# Patient Record
Sex: MALE | Race: WHITE | Employment: OTHER | URBAN - NONMETROPOLITAN AREA
[De-identification: names, ages, dates, MRNs, and addresses within clinical notes are randomized per-mention and may not be internally consistent; named-entity substitution may affect disease eponyms.]

---

## 2017-01-12 ENCOUNTER — ANTI-COAG VISIT (OUTPATIENT)
Dept: CARDIOLOGY | Age: 62
End: 2017-01-12
Payer: MEDICARE

## 2017-01-12 DIAGNOSIS — I48.0 PAF (PAROXYSMAL ATRIAL FIBRILLATION) (HCC): Primary | ICD-10-CM

## 2017-01-12 LAB
INTERNATIONAL NORMALIZATION RATIO, POC: 2.2
PROTHROMBIN TIME, POC: NORMAL

## 2017-01-12 PROCEDURE — 85610 PROTHROMBIN TIME: CPT | Performed by: CLINICAL NURSE SPECIALIST

## 2017-02-09 ENCOUNTER — ANTI-COAG VISIT (OUTPATIENT)
Dept: CARDIOLOGY | Age: 62
End: 2017-02-09
Payer: MEDICARE

## 2017-02-09 DIAGNOSIS — I48.0 PAF (PAROXYSMAL ATRIAL FIBRILLATION) (HCC): Primary | ICD-10-CM

## 2017-02-09 LAB
INTERNATIONAL NORMALIZATION RATIO, POC: 1.7
PROTHROMBIN TIME, POC: NORMAL

## 2017-02-09 PROCEDURE — 1036F TOBACCO NON-USER: CPT | Performed by: NURSE PRACTITIONER

## 2017-02-09 PROCEDURE — 85610 PROTHROMBIN TIME: CPT | Performed by: NURSE PRACTITIONER

## 2017-02-21 ENCOUNTER — PREP FOR SURGERY (OUTPATIENT)
Dept: OTOLARYNGOLOGY | Facility: CLINIC | Age: 62
End: 2017-02-21

## 2017-02-21 DIAGNOSIS — K11.8 MASS OF PAROTID GLAND: Primary | ICD-10-CM

## 2017-02-21 DIAGNOSIS — K11.20 PAROTITIS: ICD-10-CM

## 2017-03-09 ENCOUNTER — ANTI-COAG VISIT (OUTPATIENT)
Dept: CARDIOLOGY | Age: 62
End: 2017-03-09
Payer: MEDICARE

## 2017-03-09 DIAGNOSIS — I48.0 PAF (PAROXYSMAL ATRIAL FIBRILLATION) (HCC): Primary | ICD-10-CM

## 2017-03-09 LAB
INTERNATIONAL NORMALIZATION RATIO, POC: 1.6
PROTHROMBIN TIME, POC: NORMAL

## 2017-03-09 PROCEDURE — 85610 PROTHROMBIN TIME: CPT | Performed by: CLINICAL NURSE SPECIALIST

## 2017-03-09 PROCEDURE — 1036F TOBACCO NON-USER: CPT | Performed by: CLINICAL NURSE SPECIALIST

## 2017-03-14 ENCOUNTER — APPOINTMENT (OUTPATIENT)
Dept: PREADMISSION TESTING | Facility: HOSPITAL | Age: 62
End: 2017-03-14

## 2017-03-14 ENCOUNTER — HOSPITAL ENCOUNTER (OUTPATIENT)
Dept: GENERAL RADIOLOGY | Facility: HOSPITAL | Age: 62
Discharge: HOME | End: 2017-03-14
Admitting: NURSE PRACTITIONER

## 2017-03-14 VITALS
SYSTOLIC BLOOD PRESSURE: 160 MMHG | RESPIRATION RATE: 18 BRPM | HEART RATE: 78 BPM | DIASTOLIC BLOOD PRESSURE: 87 MMHG | HEIGHT: 73 IN | WEIGHT: 236 LBS | BODY MASS INDEX: 31.28 KG/M2 | OXYGEN SATURATION: 97 %

## 2017-03-14 DIAGNOSIS — K11.20 PAROTITIS: ICD-10-CM

## 2017-03-14 DIAGNOSIS — K11.8 MASS OF PAROTID GLAND: ICD-10-CM

## 2017-03-14 LAB
ALBUMIN SERPL-MCNC: 4.3 G/DL (ref 3.5–5)
ALBUMIN/GLOB SERPL: 1.3 G/DL (ref 1.1–2.5)
ALP SERPL-CCNC: 88 U/L (ref 24–120)
ALT SERPL W P-5'-P-CCNC: 50 U/L (ref 0–54)
ANION GAP SERPL CALCULATED.3IONS-SCNC: 10 MMOL/L (ref 4–13)
APTT PPP: 34.8 SECONDS (ref 24.1–34.8)
AST SERPL-CCNC: 45 U/L (ref 7–45)
BILIRUB SERPL-MCNC: 0.9 MG/DL (ref 0.1–1)
BUN BLD-MCNC: 15 MG/DL (ref 5–21)
BUN/CREAT SERPL: 15 (ref 7–25)
CALCIUM SPEC-SCNC: 9.3 MG/DL (ref 8.4–10.4)
CHLORIDE SERPL-SCNC: 103 MMOL/L (ref 98–110)
CO2 SERPL-SCNC: 26 MMOL/L (ref 24–31)
CREAT BLD-MCNC: 1 MG/DL (ref 0.5–1.4)
DEPRECATED RDW RBC AUTO: 41.5 FL (ref 40–54)
ERYTHROCYTE [DISTWIDTH] IN BLOOD BY AUTOMATED COUNT: 12.5 % (ref 12–15)
GFR SERPL CREATININE-BSD FRML MDRD: 76 ML/MIN/1.73
GLOBULIN UR ELPH-MCNC: 3.4 GM/DL
GLUCOSE BLD-MCNC: 327 MG/DL (ref 70–100)
HCT VFR BLD AUTO: 45.2 % (ref 40–52)
HGB BLD-MCNC: 16 G/DL (ref 14–18)
MCH RBC QN AUTO: 32.3 PG (ref 28–32)
MCHC RBC AUTO-ENTMCNC: 35.4 G/DL (ref 33–36)
MCV RBC AUTO: 91.1 FL (ref 82–95)
PLATELET # BLD AUTO: 213 10*3/MM3 (ref 130–400)
PMV BLD AUTO: 11.2 FL (ref 6–12)
POTASSIUM BLD-SCNC: 4.3 MMOL/L (ref 3.5–5.3)
PROT SERPL-MCNC: 7.7 G/DL (ref 6.3–8.7)
RBC # BLD AUTO: 4.96 10*6/MM3 (ref 4.8–5.9)
SODIUM BLD-SCNC: 139 MMOL/L (ref 135–145)
WBC NRBC COR # BLD: 8.73 10*3/MM3 (ref 4.8–10.8)

## 2017-03-14 PROCEDURE — 85027 COMPLETE CBC AUTOMATED: CPT | Performed by: NURSE PRACTITIONER

## 2017-03-14 PROCEDURE — 93010 ELECTROCARDIOGRAM REPORT: CPT | Performed by: INTERNAL MEDICINE

## 2017-03-14 PROCEDURE — 85730 THROMBOPLASTIN TIME PARTIAL: CPT | Performed by: NURSE PRACTITIONER

## 2017-03-14 PROCEDURE — 93005 ELECTROCARDIOGRAM TRACING: CPT

## 2017-03-14 PROCEDURE — 71020 HC CHEST PA AND LATERAL: CPT

## 2017-03-14 PROCEDURE — 36415 COLL VENOUS BLD VENIPUNCTURE: CPT

## 2017-03-14 PROCEDURE — 80053 COMPREHEN METABOLIC PANEL: CPT | Performed by: NURSE PRACTITIONER

## 2017-03-14 RX ORDER — MULTIVITAMIN WITH IRON
1 TABLET ORAL 2 TIMES DAILY
COMMUNITY
End: 2020-11-30

## 2017-03-14 RX ORDER — ALLOPURINOL 300 MG/1
300 TABLET ORAL DAILY
COMMUNITY
End: 2020-04-08 | Stop reason: SDUPTHER

## 2017-03-14 RX ORDER — LEVOTHYROXINE SODIUM 112 UG/1
112 TABLET ORAL DAILY
COMMUNITY
End: 2020-03-05 | Stop reason: SDUPTHER

## 2017-03-14 RX ORDER — WARFARIN SODIUM 7.5 MG/1
7.5 TABLET ORAL
COMMUNITY
End: 2017-03-21 | Stop reason: HOSPADM

## 2017-03-14 RX ORDER — CLONIDINE HYDROCHLORIDE 0.1 MG/1
0.1 TABLET ORAL DAILY
COMMUNITY

## 2017-03-14 RX ORDER — GABAPENTIN 100 MG/1
100 CAPSULE ORAL 3 TIMES DAILY PRN
COMMUNITY
End: 2020-04-28 | Stop reason: SDUPTHER

## 2017-03-14 RX ORDER — BACLOFEN 10 MG/1
10 TABLET ORAL NIGHTLY
COMMUNITY
End: 2020-02-03 | Stop reason: SDUPTHER

## 2017-03-14 RX ORDER — MELOXICAM 7.5 MG/1
7.5 TABLET ORAL DAILY
COMMUNITY
End: 2020-03-04 | Stop reason: ALTCHOICE

## 2017-03-14 RX ORDER — LANOLIN ALCOHOL/MO/W.PET/CERES
1000 CREAM (GRAM) TOPICAL 2 TIMES DAILY
COMMUNITY
End: 2020-05-27

## 2017-03-14 NOTE — DISCHARGE INSTRUCTIONS
DAY OF SURGERY INSTRUCTIONS        YOUR SURGEON: dr daniel    PROCEDURE: excision parotid tumor with facial nerve monitoring, dissection left     DATE OF SURGERY: 3/21/2017    ARRIVAL TIME: AS DIRECTED BY OFFICE    DAY OF SURGERY TAKE ONLY THESE MEDICATIONS: clonidine            BEFORE YOU COME TO THE HOSPITAL  (Pre-op instructions)  • Do not eat, drink, smoke or chew gum after midnight the night before surgery.  This also includes no mints.  • Morning of surgery take only the medicines you have been instructed with a sip of water unless otherwise instructed  by your physician.  • Do not shave, wear makeup or dark nail polish.  • Remove all jewelry including rings.  • Leave anything you consider valuable at home.  • Leave your suitcase in the car until after your surgery.  • Bring the following with you if applicable:  o Picture ID and insurance, Medicare or Medicaid cards  o Co-pay/deductible required by insurance (cash, check, credit card)  o Medications (no narcotics) in original bottles (not a list) including all over-the-counter medications.  o Copy of advance directive, living will or power-of- documents if not brought to PAT  o CPAP or BIPAP mask and tubing  o Skin prep instruction sheet  o Relaxation aids (MP3 player, book, magazine)  • Confirm your arrival time with you surgeon the day before your surgery (surgery times are subject to change)  • On the day of surgery check in at registration located at the main entrance of the hospital.       Outpatient Surgery Guidelines, Adult  Outpatient procedures are those for which the person having the procedure is allowed to go home the same day as the procedure. Various procedures are done on an outpatient basis. You should follow some general guidelines if you will be having an outpatient procedure.  LET YOUR HEALTH CARE PROVIDER KNOW ABOUT:  · Any allergies you have.  · All medicines you are taking, including vitamins, herbs, eye drops, creams, and  over-the-counter medicines.  · Previous problems you or members of your family have had with the use of anesthetics.  · Any blood disorders you have.  · Previous surgeries you have had.  · Medical conditions you have.  RISKS AND COMPLICATIONS  Your health care provider will discuss possible risks and complications with you before surgery. Common risks and complications include:    · Problems due to the use of anesthetics.  · Blood loss and replacement (does not apply to minor surgical procedures).  · Temporary increase in pain due to surgery.  · Uncorrected pain or problems that the surgery was meant to correct.  · Infection.  · New damage.  BEFORE THE PROCEDURE  · Ask your health care provider about changing or stopping your regular medicines. You may need to stop taking certain medicines in the days or weeks before the procedure.  · Stop smoking at least 2 weeks before surgery. This lowers your risk for complications during and after surgery. Ask your health care provider for help with this if needed.  · Eat your usual meals and a light supper the day before surgery. Continue fluid intake. Do not drink alcohol.  · Do not eat or drink after midnight the night before your surgery.   · Arrange for someone to take you home and to stay with you for 24 hours after the procedure. Medicine given for your procedure may affect your ability to drive or to care for yourself.  · Call your health care provider's office if you develop an illness or problem that may prevent you from safely having your procedure.  AFTER THE PROCEDURE  After surgery, you will be taken to a recovery area, where your progress will be monitored. If there are no complications, you will be allowed to go home when you are awake, stable, and taking fluids well. You may have numbness around the surgical site. Healing will take some time. You will have tenderness at the surgical site and may have some swelling and bruising. You may also have some  nausea.  HOME CARE INSTRUCTIONS  · Do not drive for 24 hours, or as directed by your health care provider. Do not drive while taking prescription pain medicines.  · Do not drink alcohol for 24 hours.  · Do not make important decisions or sign legal documents for 24 hours.  · You may resume a normal diet and activities as directed.  · Do not lift anything heavier than 10 pounds (4.5 kg) or play contact sports until your health care provider says it is okay.  · Change your bandages (dressings) as directed.  · Only take over-the-counter or prescription medicines as directed by your health care provider.  · Follow up with your health care provider as directed.  SEEK MEDICAL CARE IF:  · You have increased bleeding (more than a small spot) from the surgical site.  · You have redness, swelling, or increasing pain in the wound.  · You see pus coming from the wound.  · You have a fever.  · You notice a bad smell coming from the wound or dressing.  · You feel lightheaded or faint.  · You develop a rash.  · You have trouble breathing.  · You develop allergies.  MAKE SURE YOU:  · Understand these instructions.  · Will watch your condition.  · Will get help right away if you are not doing well or get worse.     This information is not intended to replace advice given to you by your health care provider. Make sure you discuss any questions you have with your health care provider.     Document Released: 09/12/2002 Document Revised: 05/03/2016 Document Reviewed: 05/22/2014  [employer] Interactive Patient Education ©2016 [employer]       Fall Prevention in Hospitals, Adult  As a hospital patient, your condition and the treatments you receive can increase your risk for falls. Some additional risk factors for falls in a hospital include:  · Being in an unfamiliar environment.  · Being on bed rest.  · Your surgery.  · Taking certain medicines.  · Your tubing requirements, such as intravenous (IV) therapy or catheters.  It is important  that you learn how to decrease fall risks while at the hospital. Below are important tips that can help prevent falls.  SAFETY TIPS FOR PREVENTING FALLS  Talk about your risk of falling.  · Ask your health care provider why you are at risk for falling. Is it your medicine, illness, tubing placement, or something else?  · Make a plan with your health care provider to keep you safe from falls.  · Ask your health care provider or pharmacist about side effects of your medicines. Some medicines can make you dizzy or affect your coordination.  Ask for help.  · Ask for help before getting out of bed. You may need to press your call button.  · Ask for assistance in getting safely to the toilet.  · Ask for a walker or cane to be put at your bedside. Ask that most of the side rails on your bed be placed up before your health care provider leaves the room.  · Ask family or friends to sit with you.  · Ask for things that are out of your reach, such as your glasses, hearing aids, telephone, bedside table, or call button.  Follow these tips to avoid falling:  · Stay lying or seated, rather than standing, while waiting for help.  · Wear rubber-soled slippers or shoes whenever you walk in the hospital.  · Avoid quick, sudden movements.  ¨ Change positions slowly.  ¨ Sit on the side of your bed before standing.  ¨ Stand up slowly and wait before you start to walk.  · Let your health care provider know if there is a spill on the floor.  · Pay careful attention to the medical equipment, electrical cords, and tubes around you.  · When you need help, use your call button by your bed or in the bathroom. Wait for one of your health care providers to help you.  · If you feel dizzy or unsure of your footing, return to bed and wait for assistance.  · Avoid being distracted by the TV, telephone, or another person in your room.  · Do not lean or support yourself on rolling objects, such as IV poles or bedside tables.     This information is  not intended to replace advice given to you by your health care provider. Make sure you discuss any questions you have with your health care provider.     Document Released: 12/15/2001 Document Revised: 01/08/2016 Document Reviewed: 08/25/2013  [employer] Interactive Patient Education ©2016 [employer]       Surgical Site Infections FAQs  What is a Surgical Site Infection (SSI)?  A surgical site infection is an infection that occurs after surgery in the part of the body where the surgery took place. Most patients who have surgery do not develop an infection. However, infections develop in about 1 to 3 out of every 100 patients who have surgery.  Some of the common symptoms of a surgical site infection are:  · Redness and pain around the area where you had surgery  · Drainage of cloudy fluid from your surgical wound  · Fever  Can SSIs be treated?  Yes. Most surgical site infections can be treated with antibiotics. The antibiotic given to you depends on the bacteria (germs) causing the infection. Sometimes patients with SSIs also need another surgery to treat the infection.  What are some of the things that hospitals are doing to prevent SSIs?  To prevent SSIs, doctors, nurses, and other healthcare providers:  · Clean their hands and arms up to their elbows with an antiseptic agent just before the surgery.  · Clean their hands with soap and water or an alcohol-based hand rub before and after caring for each patient.  · May remove some of your hair immediately before your surgery using electric clippers if the hair is in the same area where the procedure will occur. They should not shave you with a razor.  · Wear special hair covers, masks, gowns, and gloves during surgery to keep the surgery area clean.  · Give you antibiotics before your surgery starts. In most cases, you should get antibiotics within 60 minutes before the surgery starts and the antibiotics should be stopped within 24 hours after surgery.  · Clean  the skin at the site of your surgery with a special soap that kills germs.  What can I do to help prevent SSIs?  Before your surgery:  · Tell your doctor about other medical problems you may have. Health problems such as allergies, diabetes, and obesity could affect your surgery and your treatment.  · Quit smoking. Patients who smoke get more infections. Talk to your doctor about how you can quit before your surgery.  · Do not shave near where you will have surgery. Shaving with a razor can irritate your skin and make it easier to develop an infection.  At the time of your surgery:  · Speak up if someone tries to shave you with a razor before surgery. Ask why you need to be shaved and talk with your surgeon if you have any concerns.  · Ask if you will get antibiotics before surgery.  After your surgery:  · Make sure that your healthcare providers clean their hands before examining you, either with soap and water or an alcohol-based hand rub.  · If you do not see your providers clean their hands, please ask them to do so.  · Family and friends who visit you should not touch the surgical wound or dressings.  · Family and friends should clean their hands with soap and water or an alcohol-based hand rub before and after visiting you. If you do not see them clean their hands, ask them to clean their hands.  What do I need to do when I go home from the hospital?  · Before you go home, your doctor or nurse should explain everything you need to know about taking care of your wound. Make sure you understand how to care for your wound before you leave the hospital.  · Always clean your hands before and after caring for your wound.  · Before you go home, make sure you know who to contact if you have questions or problems after you get home.  · If you have any symptoms of an infection, such as redness and pain at the surgery site, drainage, or fever, call your doctor immediately.  If you have additional questions, please ask  your doctor or nurse.  Developed and co-sponsored by The Society for Healthcare Epidemiology of [place] (SHEA); Infectious Diseases Society of [place] (IDSA); American Hospital Association; Association for Professionals in Infection Control and Epidemiology (APIC); Centers for Disease Control and Prevention (CDC); and The Joint Commission.     This information is not intended to replace advice given to you by your health care provider. Make sure you discuss any questions you have with your health care provider.     Document Released: 12/23/2014 Document Revised: 01/08/2016 Document Reviewed: 03/02/2016  [employer] Interactive Patient Education ©2016 [employer]     PATIENT/FAMILY/RESPONSIBLE PARTY VERBALIZES UNDERSTANDING OF ABOVE EDUCATION

## 2017-03-16 ENCOUNTER — ANTI-COAG VISIT (OUTPATIENT)
Dept: CARDIOLOGY | Age: 62
End: 2017-03-16
Payer: MEDICARE

## 2017-03-16 DIAGNOSIS — I48.0 PAF (PAROXYSMAL ATRIAL FIBRILLATION) (HCC): Primary | ICD-10-CM

## 2017-03-16 LAB
INTERNATIONAL NORMALIZATION RATIO, POC: 2
PROTHROMBIN TIME, POC: NORMAL

## 2017-03-16 PROCEDURE — 85610 PROTHROMBIN TIME: CPT | Performed by: CLINICAL NURSE SPECIALIST

## 2017-03-16 PROCEDURE — 1036F TOBACCO NON-USER: CPT | Performed by: CLINICAL NURSE SPECIALIST

## 2017-03-21 ENCOUNTER — ANESTHESIA EVENT (OUTPATIENT)
Dept: PERIOP | Facility: HOSPITAL | Age: 62
End: 2017-03-21

## 2017-03-21 ENCOUNTER — ANESTHESIA (OUTPATIENT)
Dept: PERIOP | Facility: HOSPITAL | Age: 62
End: 2017-03-21

## 2017-03-21 ENCOUNTER — HOSPITAL ENCOUNTER (OUTPATIENT)
Facility: HOSPITAL | Age: 62
Setting detail: SURGERY ADMIT
Discharge: HOME | End: 2017-03-21
Attending: OTOLARYNGOLOGY | Admitting: OTOLARYNGOLOGY

## 2017-03-21 VITALS
RESPIRATION RATE: 16 BRPM | SYSTOLIC BLOOD PRESSURE: 144 MMHG | TEMPERATURE: 97.9 F | DIASTOLIC BLOOD PRESSURE: 78 MMHG | OXYGEN SATURATION: 94 % | HEART RATE: 100 BPM

## 2017-03-21 DIAGNOSIS — K11.8 MASS OF PAROTID GLAND: ICD-10-CM

## 2017-03-21 DIAGNOSIS — K11.20 PAROTITIS: ICD-10-CM

## 2017-03-21 PROBLEM — D49.0 PAROTID TUMOR: Status: ACTIVE | Noted: 2017-03-21

## 2017-03-21 PROBLEM — D49.0 PAROTID TUMOR: Status: RESOLVED | Noted: 2017-03-21 | Resolved: 2017-03-21

## 2017-03-21 LAB
GLUCOSE BLDC GLUCOMTR-MCNC: 259 MG/DL (ref 70–130)
GLUCOSE BLDC GLUCOMTR-MCNC: 271 MG/DL (ref 70–130)
GLUCOSE BLDC GLUCOMTR-MCNC: 284 MG/DL (ref 70–130)
INR PPP: 1.07 (ref 0.91–1.09)
PROTHROMBIN TIME: 14.2 SECONDS (ref 11.9–14.6)

## 2017-03-21 PROCEDURE — 25010000002 MIDAZOLAM PER 1 MG: Performed by: ANESTHESIOLOGY

## 2017-03-21 PROCEDURE — 88341 IMHCHEM/IMCYTCHM EA ADD ANTB: CPT | Performed by: OTOLARYNGOLOGY

## 2017-03-21 PROCEDURE — 88307 TISSUE EXAM BY PATHOLOGIST: CPT | Performed by: OTOLARYNGOLOGY

## 2017-03-21 PROCEDURE — 88305 TISSUE EXAM BY PATHOLOGIST: CPT | Performed by: OTOLARYNGOLOGY

## 2017-03-21 PROCEDURE — 25010000002 PROPOFOL 10 MG/ML EMULSION: Performed by: NURSE ANESTHETIST, CERTIFIED REGISTERED

## 2017-03-21 PROCEDURE — 25010000002 FENTANYL CITRATE (PF) 250 MCG/5ML SOLUTION: Performed by: NURSE ANESTHETIST, CERTIFIED REGISTERED

## 2017-03-21 PROCEDURE — 85610 PROTHROMBIN TIME: CPT | Performed by: NURSE PRACTITIONER

## 2017-03-21 PROCEDURE — 42415 EXCISE PAROTID GLAND/LESION: CPT | Performed by: OTOLARYNGOLOGY

## 2017-03-21 PROCEDURE — 82962 GLUCOSE BLOOD TEST: CPT

## 2017-03-21 PROCEDURE — 88331 PATH CONSLTJ SURG 1 BLK 1SPC: CPT | Performed by: OTOLARYNGOLOGY

## 2017-03-21 PROCEDURE — 25010000002 SUCCINYLCHOLINE PER 20 MG: Performed by: NURSE ANESTHETIST, CERTIFIED REGISTERED

## 2017-03-21 PROCEDURE — 25010000002 ONDANSETRON PER 1 MG: Performed by: NURSE ANESTHETIST, CERTIFIED REGISTERED

## 2017-03-21 PROCEDURE — 88342 IMHCHEM/IMCYTCHM 1ST ANTB: CPT | Performed by: OTOLARYNGOLOGY

## 2017-03-21 PROCEDURE — 25010000003 CEFAZOLIN PER 500 MG: Performed by: NURSE ANESTHETIST, CERTIFIED REGISTERED

## 2017-03-21 RX ORDER — CEFAZOLIN SODIUM 1 G/3ML
INJECTION, POWDER, FOR SOLUTION INTRAMUSCULAR; INTRAVENOUS AS NEEDED
Status: DISCONTINUED | OUTPATIENT
Start: 2017-03-21 | End: 2017-03-21 | Stop reason: SURG

## 2017-03-21 RX ORDER — HYDROCODONE BITARTRATE AND ACETAMINOPHEN 5; 325 MG/1; MG/1
1 TABLET ORAL ONCE AS NEEDED
Status: DISCONTINUED | OUTPATIENT
Start: 2017-03-21 | End: 2017-03-21 | Stop reason: HOSPADM

## 2017-03-21 RX ORDER — LABETALOL HYDROCHLORIDE 5 MG/ML
5 INJECTION, SOLUTION INTRAVENOUS
Status: DISCONTINUED | OUTPATIENT
Start: 2017-03-21 | End: 2017-03-21 | Stop reason: HOSPADM

## 2017-03-21 RX ORDER — LIDOCAINE HYDROCHLORIDE 20 MG/ML
INJECTION, SOLUTION INFILTRATION; PERINEURAL AS NEEDED
Status: DISCONTINUED | OUTPATIENT
Start: 2017-03-21 | End: 2017-03-21 | Stop reason: SURG

## 2017-03-21 RX ORDER — MIDAZOLAM HYDROCHLORIDE 1 MG/ML
2 INJECTION INTRAMUSCULAR; INTRAVENOUS
Status: DISCONTINUED | OUTPATIENT
Start: 2017-03-21 | End: 2017-03-21 | Stop reason: HOSPADM

## 2017-03-21 RX ORDER — SODIUM CHLORIDE 0.9 % (FLUSH) 0.9 %
1-10 SYRINGE (ML) INJECTION AS NEEDED
Status: DISCONTINUED | OUTPATIENT
Start: 2017-03-21 | End: 2017-03-21 | Stop reason: HOSPADM

## 2017-03-21 RX ORDER — MORPHINE SULFATE 2 MG/ML
2 INJECTION, SOLUTION INTRAMUSCULAR; INTRAVENOUS AS NEEDED
Status: DISCONTINUED | OUTPATIENT
Start: 2017-03-21 | End: 2017-03-21 | Stop reason: HOSPADM

## 2017-03-21 RX ORDER — PROMETHAZINE HYDROCHLORIDE 25 MG/ML
12.5 INJECTION, SOLUTION INTRAMUSCULAR; INTRAVENOUS ONCE AS NEEDED
Status: DISCONTINUED | OUTPATIENT
Start: 2017-03-21 | End: 2017-03-21 | Stop reason: HOSPADM

## 2017-03-21 RX ORDER — SODIUM CHLORIDE 9 MG/ML
INJECTION, SOLUTION INTRAVENOUS CONTINUOUS PRN
Status: DISCONTINUED | OUTPATIENT
Start: 2017-03-21 | End: 2017-03-21 | Stop reason: HOSPADM

## 2017-03-21 RX ORDER — VASOPRESSIN 20 U/ML
INJECTION PARENTERAL AS NEEDED
Status: DISCONTINUED | OUTPATIENT
Start: 2017-03-21 | End: 2017-03-21 | Stop reason: SURG

## 2017-03-21 RX ORDER — BENZONATATE 100 MG/1
100 CAPSULE ORAL 3 TIMES DAILY PRN
Status: DISCONTINUED | OUTPATIENT
Start: 2017-03-21 | End: 2017-03-21 | Stop reason: HOSPADM

## 2017-03-21 RX ORDER — HYDROCODONE BITARTRATE AND ACETAMINOPHEN 5; 325 MG/1; MG/1
1-2 TABLET ORAL EVERY 4 HOURS PRN
Qty: 30 TABLET | Refills: 0 | Status: SHIPPED | OUTPATIENT
Start: 2017-03-21 | End: 2017-04-25

## 2017-03-21 RX ORDER — ONDANSETRON 2 MG/ML
INJECTION INTRAMUSCULAR; INTRAVENOUS AS NEEDED
Status: DISCONTINUED | OUTPATIENT
Start: 2017-03-21 | End: 2017-03-21 | Stop reason: SURG

## 2017-03-21 RX ORDER — LIDOCAINE HYDROCHLORIDE AND EPINEPHRINE 10; 10 MG/ML; UG/ML
INJECTION, SOLUTION INFILTRATION; PERINEURAL AS NEEDED
Status: DISCONTINUED | OUTPATIENT
Start: 2017-03-21 | End: 2017-03-21 | Stop reason: HOSPADM

## 2017-03-21 RX ORDER — ROCURONIUM BROMIDE 10 MG/ML
INJECTION, SOLUTION INTRAVENOUS AS NEEDED
Status: DISCONTINUED | OUTPATIENT
Start: 2017-03-21 | End: 2017-03-21 | Stop reason: SURG

## 2017-03-21 RX ORDER — METOCLOPRAMIDE HYDROCHLORIDE 5 MG/ML
5 INJECTION INTRAMUSCULAR; INTRAVENOUS
Status: DISCONTINUED | OUTPATIENT
Start: 2017-03-21 | End: 2017-03-21 | Stop reason: HOSPADM

## 2017-03-21 RX ORDER — PROPOFOL 10 MG/ML
VIAL (ML) INTRAVENOUS AS NEEDED
Status: DISCONTINUED | OUTPATIENT
Start: 2017-03-21 | End: 2017-03-21 | Stop reason: SURG

## 2017-03-21 RX ORDER — CEPHALEXIN 500 MG/1
500 CAPSULE ORAL 4 TIMES DAILY
Qty: 28 CAPSULE | Refills: 0 | Status: SHIPPED | OUTPATIENT
Start: 2017-03-21 | End: 2017-03-28

## 2017-03-21 RX ORDER — ONDANSETRON 2 MG/ML
4 INJECTION INTRAMUSCULAR; INTRAVENOUS ONCE AS NEEDED
Status: DISCONTINUED | OUTPATIENT
Start: 2017-03-21 | End: 2017-03-21 | Stop reason: HOSPADM

## 2017-03-21 RX ORDER — FENTANYL CITRATE 50 UG/ML
INJECTION, SOLUTION INTRAMUSCULAR; INTRAVENOUS AS NEEDED
Status: DISCONTINUED | OUTPATIENT
Start: 2017-03-21 | End: 2017-03-21 | Stop reason: SURG

## 2017-03-21 RX ORDER — ESMOLOL HYDROCHLORIDE 10 MG/ML
INJECTION INTRAVENOUS AS NEEDED
Status: DISCONTINUED | OUTPATIENT
Start: 2017-03-21 | End: 2017-03-21 | Stop reason: SURG

## 2017-03-21 RX ORDER — HYDRALAZINE HYDROCHLORIDE 20 MG/ML
5 INJECTION INTRAMUSCULAR; INTRAVENOUS
Status: DISCONTINUED | OUTPATIENT
Start: 2017-03-21 | End: 2017-03-21 | Stop reason: HOSPADM

## 2017-03-21 RX ORDER — FLUMAZENIL 0.1 MG/ML
0.2 INJECTION INTRAVENOUS AS NEEDED
Status: DISCONTINUED | OUTPATIENT
Start: 2017-03-21 | End: 2017-03-21 | Stop reason: HOSPADM

## 2017-03-21 RX ORDER — NALOXONE HCL 0.4 MG/ML
0.04 VIAL (ML) INJECTION AS NEEDED
Status: DISCONTINUED | OUTPATIENT
Start: 2017-03-21 | End: 2017-03-21 | Stop reason: HOSPADM

## 2017-03-21 RX ORDER — PHENYLEPHRINE HCL IN 0.9% NACL 0.8MG/10ML
SYRINGE (ML) INTRAVENOUS AS NEEDED
Status: DISCONTINUED | OUTPATIENT
Start: 2017-03-21 | End: 2017-03-21 | Stop reason: SURG

## 2017-03-21 RX ORDER — ONDANSETRON 2 MG/ML
4 INJECTION INTRAMUSCULAR; INTRAVENOUS AS NEEDED
Status: DISCONTINUED | OUTPATIENT
Start: 2017-03-21 | End: 2017-03-21 | Stop reason: HOSPADM

## 2017-03-21 RX ORDER — MIDAZOLAM HYDROCHLORIDE 1 MG/ML
1 INJECTION INTRAMUSCULAR; INTRAVENOUS
Status: DISCONTINUED | OUTPATIENT
Start: 2017-03-21 | End: 2017-03-21 | Stop reason: HOSPADM

## 2017-03-21 RX ORDER — MEPERIDINE HYDROCHLORIDE 25 MG/ML
12.5 INJECTION INTRAMUSCULAR; INTRAVENOUS; SUBCUTANEOUS
Status: DISCONTINUED | OUTPATIENT
Start: 2017-03-21 | End: 2017-03-21 | Stop reason: HOSPADM

## 2017-03-21 RX ORDER — SUCCINYLCHOLINE CHLORIDE 20 MG/ML
INJECTION INTRAMUSCULAR; INTRAVENOUS AS NEEDED
Status: DISCONTINUED | OUTPATIENT
Start: 2017-03-21 | End: 2017-03-21 | Stop reason: SURG

## 2017-03-21 RX ORDER — SODIUM CHLORIDE, SODIUM LACTATE, POTASSIUM CHLORIDE, CALCIUM CHLORIDE 600; 310; 30; 20 MG/100ML; MG/100ML; MG/100ML; MG/100ML
100 INJECTION, SOLUTION INTRAVENOUS CONTINUOUS
Status: DISCONTINUED | OUTPATIENT
Start: 2017-03-21 | End: 2017-03-21 | Stop reason: HOSPADM

## 2017-03-21 RX ORDER — IPRATROPIUM BROMIDE AND ALBUTEROL SULFATE 2.5; .5 MG/3ML; MG/3ML
3 SOLUTION RESPIRATORY (INHALATION) ONCE AS NEEDED
Status: DISCONTINUED | OUTPATIENT
Start: 2017-03-21 | End: 2017-03-21 | Stop reason: HOSPADM

## 2017-03-21 RX ADMIN — SUCCINYLCHOLINE CHLORIDE 100 MG: 20 INJECTION, SOLUTION INTRAMUSCULAR; INTRAVENOUS at 11:35

## 2017-03-21 RX ADMIN — ROCURONIUM BROMIDE 5 MG: 10 INJECTION INTRAVENOUS at 11:35

## 2017-03-21 RX ADMIN — LIDOCAINE HYDROCHLORIDE 0.5 ML: 10 INJECTION, SOLUTION EPIDURAL; INFILTRATION; INTRACAUDAL; PERINEURAL at 10:28

## 2017-03-21 RX ADMIN — VASOPRESSIN 1 UNITS: 20 INJECTION INTRAVENOUS at 12:15

## 2017-03-21 RX ADMIN — BENZONATATE 100 MG: 100 CAPSULE, LIQUID FILLED ORAL at 11:18

## 2017-03-21 RX ADMIN — Medication 80 MCG: at 13:10

## 2017-03-21 RX ADMIN — EPHEDRINE SULFATE 10 MG: 50 INJECTION INTRAMUSCULAR; INTRAVENOUS; SUBCUTANEOUS at 12:38

## 2017-03-21 RX ADMIN — Medication 80 MCG: at 12:12

## 2017-03-21 RX ADMIN — EPHEDRINE SULFATE 20 MG: 50 INJECTION INTRAMUSCULAR; INTRAVENOUS; SUBCUTANEOUS at 12:21

## 2017-03-21 RX ADMIN — FENTANYL CITRATE 100 MCG: 50 INJECTION INTRAMUSCULAR; INTRAVENOUS at 11:35

## 2017-03-21 RX ADMIN — VASOPRESSIN 0.5 UNITS: 20 INJECTION INTRAVENOUS at 12:18

## 2017-03-21 RX ADMIN — ONDANSETRON HYDROCHLORIDE 4 MG: 2 SOLUTION INTRAMUSCULAR; INTRAVENOUS at 13:20

## 2017-03-21 RX ADMIN — Medication 80 MCG: at 12:54

## 2017-03-21 RX ADMIN — SODIUM CHLORIDE, POTASSIUM CHLORIDE, SODIUM LACTATE AND CALCIUM CHLORIDE: 600; 310; 30; 20 INJECTION, SOLUTION INTRAVENOUS at 13:25

## 2017-03-21 RX ADMIN — FENTANYL CITRATE 150 MCG: 50 INJECTION INTRAMUSCULAR; INTRAVENOUS at 11:41

## 2017-03-21 RX ADMIN — Medication 80 MCG: at 12:19

## 2017-03-21 RX ADMIN — MIDAZOLAM HYDROCHLORIDE 2 MG: 1 INJECTION, SOLUTION INTRAMUSCULAR; INTRAVENOUS at 11:18

## 2017-03-21 RX ADMIN — Medication 160 MCG: at 12:01

## 2017-03-21 RX ADMIN — SUCCINYLCHOLINE CHLORIDE 60 MG: 20 INJECTION, SOLUTION INTRAMUSCULAR; INTRAVENOUS at 11:52

## 2017-03-21 RX ADMIN — HYDROCODONE BITARTRATE AND ACETAMINOPHEN 1 TABLET: 5; 325 TABLET ORAL at 16:11

## 2017-03-21 RX ADMIN — CEFAZOLIN 2 G: 1 INJECTION, POWDER, FOR SOLUTION INTRAVENOUS at 11:42

## 2017-03-21 RX ADMIN — Medication 80 MCG: at 12:38

## 2017-03-21 RX ADMIN — ESMOLOL HYDROCHLORIDE 10 MG: 10 INJECTION, SOLUTION INTRAVENOUS at 13:52

## 2017-03-21 RX ADMIN — LIDOCAINE HYDROCHLORIDE 100 MG: 20 INJECTION, SOLUTION INFILTRATION; PERINEURAL at 11:35

## 2017-03-21 RX ADMIN — SODIUM CHLORIDE, POTASSIUM CHLORIDE, SODIUM LACTATE AND CALCIUM CHLORIDE 100 ML/HR: 600; 310; 30; 20 INJECTION, SOLUTION INTRAVENOUS at 10:28

## 2017-03-21 RX ADMIN — PROPOFOL 200 MG: 10 INJECTION, EMULSION INTRAVENOUS at 11:35

## 2017-03-21 NOTE — ANESTHESIA PREPROCEDURE EVALUATION
Anesthesia Evaluation     Patient summary reviewed   no history of anesthetic complications:  NPO Status: > 8 hours   Airway   Mallampati: II  TM distance: >3 FB  Neck ROM: full  no difficulty expected  Dental - normal exam     Pulmonary - normal exam    breath sounds clear to auscultation    ROS comment: Mild hoarseness over last few weeks  Cardiovascular - normal exam  Exercise tolerance: good (4-7 METS)    Rhythm: regular  Rate: normal    (+) hypertension well controlled, past MI  >12 months, CAD,       Neuro/Psych  (+) TIA, CVA residual symptoms,      ROS Comment: Mild left upper extremity weakness  GI/Hepatic/Renal/Endo    (+)  diabetes mellitus well controlled using insulin,     Musculoskeletal     Abdominal  - normal exam    Abdomen: soft.   Substance History - negative use     OB/GYN          Other   (+) arthritis                                 Anesthesia Plan    ASA 3     general     intravenous induction   Anesthetic plan and risks discussed with patient.    Plan discussed with CRNA.

## 2017-03-21 NOTE — OP NOTE
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]    Procedure/CPT® Codes:  PA EXC PAROTD,LAT LOBE,DISSECT 7TH NERV [20285]    Procedure(s):  PAROTID TUMOR EXCISION WITH FACIAL NERVE MONITORING,  DISSECTION LEFT    Surgeon(s):  Armond Leon MD    Anesthesia: General    Staff:   Circulator: Shyla Lombardi RN  Scrub Person: Tootie Sharma; Melva Munoz  Assistant: Ayana Crawford    Estimated Blood Loss:   minimal    Specimens:                  ID Type Source Tests Collected by Time Destination   A : Left parotid mass Tissue Parotid TISSUE EXAM Armond Leon MD 3/21/2017 1317    B : level 2 tissue neck Tissue Neck TISSUE EXAM Armond Leon MD 3/21/2017 1320          Drains:   Drain/Device Site 03/21/17 1325 Left lateral neck collapsible closed device (Active)           Findings:   Large 3-4 cm circumscribed blue hued mass    Complications:   None    Reason for the Operation:  This is a 61-year-old white male that was referred for a large parotid mass left tail parotid region.  I felt that it required excision.  After discussion of the risks and benefits he agreed to proceed with the procedure    Procedure Description:  The patient was taken back to the operating room, placed supine on the operating table and placed under anesthesia by the anesthesia staff. Once this was done a time out was performed to confirm the patient and the proper procedure.  The [employer] Nerve Integrity Monitor (NIM) was set up to monitor both the upper and lower divisions of the facial nerve by placing leads in the orbicularis occuli, orbicularis oris, nasalis and mentalis muscles.  Leads were placed into the subcutaneous muscle layer. Ground and reference leads were placed. The NIM was then tested and found to be in appropriate working order with  separate tones for all divisions. After sterile prep, a handheld probe was attached and used for verifying nerve integrity when neededThe site was marked and injected with 1% lidocaine with 1:100,000 epinephrine. The site was prepped and draped in the usual manner.  An incision was made in the neck in a crease line and carried down to the platysma.  The platysma was divided.  Subplatysmal flaps were elevated superiorly and inferiorly and carried out superiorly to the pre-parotid fatty plane.  Once this was done under hooks were used to retract the flaps.  The mass appeared to be well circumscribed inferiorly and since it was located in the tail parotid, I elected to do more of a direct parotidectomy.  I found the anterior border of the sternocleidomastoid muscle and the mass appeared to be lying on the lateral aspect of the medial portion of the sternocleidomastoid muscle.  This was dissected off.  I slowly dissected around the mass maintaining a good distance around the capsule.  Anteriorly as able to identify some of the lower branches of the facial nerve with the nerve monitor and dissected them in a retrograde fashion until they were safely under where the mass was.  Superiorly there was more of an extension that went superiorly off the mass and I dissected this to keep the mass in its entirety without violating the capsule.  I removed some of the superior attachments from the parotid gland.  Once I was able to dissect the mass from the surrounding tissue was removed and sent as a specimen in its entirety.  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  I irrigated with copious amounts of normal saline and then provided hemostasis with bipolar cautery.  I placed a small Vance drain and secured it with a 2-0 silk suture.  I then closed the wound with interrupted 3-0 Vicryl in the platysma layer.  I ran a 4-0 Monocryl in a subcuticular stitch.  We placed  Mastisol and Steri-Strips.   The patient was then turned over to the anesthesia team and allowed to wake from anesthesia. The patient was transported to the recovery room in a stable condition.       Armond Leon MD     Date: 3/21/2017  Time: 1:42 PM

## 2017-03-21 NOTE — DISCHARGE INSTRUCTIONS

## 2017-03-21 NOTE — DISCHARGE INSTR - APPOINTMENTS
#1 Empty and record the output from your VALERIA drain every 8 hours.    #2 You may resume your Coumadin on Saturday.

## 2017-03-21 NOTE — ANESTHESIA PROCEDURE NOTES
Airway  Urgency: elective    Date/Time: 3/21/2017 11:59 AM  End Time:3/21/2017 11:59 AM    General Information and Staff    Patient location during procedure: OR  CRNA: MIREYA HARMON    Indications and Patient Condition  Indications for airway management: airway protection    Preoxygenated: yes  Mask difficulty assessment: 1 - vent by mask    Final Airway Details  Final airway type: endotracheal airway      Successful airway: ETT    Successful intubation technique: direct laryngoscopy  Facilitating devices/methods: intubating stylet  Endotracheal tube insertion site: oral  Blade: Howard  Blade size: 3.5.  ETT size: 7.5 mm  Cormack-Lehane Classification: grade IIa - partial view of glottis  Placement verified by: chest auscultation and capnometry   Measured from: gums  ETT to gums (cm): 22  Number of attempts at approach: 2

## 2017-03-21 NOTE — ANESTHESIA POSTPROCEDURE EVALUATION
Patient: Jn Cárdenas    Procedure Summary     Date Anesthesia Start Anesthesia Stop Room / Location    03/21/17 4060 [id number]  [facility] OR 02 /  [facility]       Procedure Diagnosis Surgeon Provider    PAROTID TUMOR EXCISION WITH FACIAL NERVE MONITORING,  DISSECTION LEFT (Left Neck) Parotitis; Mass of parotid gland  (Parotitis [K11.20]; Mass of parotid gland [R22.0]) MD Armond Miller CRNA          Anesthesia Type: general  Last vitals  /92 (03/21/17 1500)    Temp 97.9 °F (36.6 °C) (03/21/17 1520)    Pulse 105 (03/21/17 1515)   Resp 12 (03/21/17 1515)    SpO2 98 % (03/21/17 1520)      Post Anesthesia Care and Evaluation    Patient location during evaluation: PACU  Patient participation: complete - patient participated  Level of consciousness: awake  Pain management: adequate  Airway patency: patent  Anesthetic complications: No anesthetic complications    Cardiovascular status: acceptable  Respiratory status: acceptable  Hydration status: acceptable

## 2017-03-21 NOTE — PLAN OF CARE
Problem: Patient Care Overview (Adult)  Goal: Plan of Care Review  Outcome: Outcome(s) achieved Date Met:  03/21/17 03/21/17 9178   Coping/Psychosocial Response Interventions   Plan Of Care Reviewed With patient;spouse   Patient Care Overview   Progress improving   Outcome Evaluation   Outcome Summary/Follow up Plan 10 ml emptied from VALERIA drain. draining well. patient meets discharge criteria         Problem: Perioperative Period (Adult)  Goal: Signs and Symptoms of Listed Potential Problems Will be Absent or Manageable (Perioperative Period)  Outcome: Outcome(s) achieved Date Met:  03/21/17

## 2017-03-21 NOTE — H&P
PRIMARY CARE PROVIDER: Fredo Herrera MD  REFERRING PROVIDER: Armond Leon MD    CHIEF COMPLAINT:  Preoperative evaluation for surgery    Subjective   History of Present Illness:  Jn Cárdenas is a  61 y.o.  male who who is here for follow up. He is scheduled for right parotidectomy. There has been no significant change in the history since the preoperative office evaluation.     Review of Systems:  CONSTITUTIONAL: no fever or chills  PULMONARY: no cough or shortness of breath  GI: no nausea or vomiting    Past History:  Past Medical History   Diagnosis Date   • Arthritis    • Cataract    • Coronary artery disease    • Diabetes mellitus    • Disease of thyroid gland    • Gout    • Hypertension    • Myocardial infarct, old      24 yrs ago   • Neuropathy      in feet and legs   • Parotid tumor    • Spastic colon    • TIA (transient ischemic attack)      weakness on left upper extremity     Past Surgical History   Procedure Laterality Date   • Other surgical history       had goiter removed  at 4 yrs old   • Other surgical history Right      had broken arm,   • Back surgery       had spinal fusion     History reviewed. No pertinent family history.  Social History   Substance Use Topics   • Smoking status: Never Smoker   • Smokeless tobacco: Never Used   • Alcohol use No       Current Facility-Administered Medications:   •  benzonatate (TESSALON) capsule 100 mg, 100 mg, Oral, TID PRN, Adan Koch MD  •  buffered lidocaine syringe 0.5 mL, 0.5 mL, Injection, Once PRN, Adan Koch MD  •  lactated ringers infusion, 100 mL/hr, Intravenous, Continuous, Adan Koch MD  •  midazolam (VERSED) injection 1 mg, 1 mg, Intravenous, Q5 Min PRN **OR** midazolam (VERSED) injection 2 mg, 2 mg, Intravenous, Q5 Min PRN, Adan Koch MD  •  sodium chloride 0.9 % flush 1-10 mL, 1-10 mL, Intravenous, PRN, Adan Koch MD  Allergies:  Review of patient's allergies indicates no known  allergies.    Objective     Vital Signs:  Temp:  [98.2 °F (36.8 °C)] 98.2 °F (36.8 °C)  Heart Rate:  [74-86] 74  Resp:  [16] 16  BP: (138)/(82) 138/82    Physical Exam:  CONSTITUTIONAL: well nourished, well-developed, alert, oriented, in no acute distress   COMMUNICATION AND VOICE: able to communicate normally, normal voice quality  HEAD: normocephalic, no lesions, atraumatic, no tenderness, no masses   FACE: appearance normal, no lesions, no tenderness, no deformities, facial motion symmetric  EYES: ocular motility normal, eyelids normal, orbits normal, no proptosis, conjunctiva normal , pupils equal, round   EARS:  Hearing: hearing to conversational voice intact bilaterally   External Ears: normal bilaterally, no lesions  NOSE:  External Nose: external nasal structure normal, no tenderness on palpation, no nasal discharge, no lesions, no evidence of trauma, nostrils patent   ORAL:  Lips: upper and lower lips without lesion   NECK:  Inspection and Palpation: neck appearance normal, no masses or tenderness  CHEST/RESPIRATORY: normal respiratory effort   CARDIOVASCULAR: no cyanosis or edema   NEUROLOGICAL/PSYCHIATRIC: oriented to time, place and person, mood normal, affect appropriate, CN II-XII intact grossly      Assessment   ASSESSMENT:  1. Mass of parotid gland        Plan   PLAN:  right parotidectomy  The risks and benefits have been re-discussed and questions answered    Armond Leon MD  03/21/17  11:16 AM

## 2017-03-24 ENCOUNTER — OFFICE VISIT (OUTPATIENT)
Dept: OTOLARYNGOLOGY | Facility: CLINIC | Age: 62
End: 2017-03-24

## 2017-03-24 VITALS
WEIGHT: 236 LBS | HEIGHT: 73 IN | TEMPERATURE: 97.4 F | SYSTOLIC BLOOD PRESSURE: 142 MMHG | BODY MASS INDEX: 31.28 KG/M2 | HEART RATE: 91 BPM | DIASTOLIC BLOOD PRESSURE: 90 MMHG

## 2017-03-24 DIAGNOSIS — D49.0 PAROTID TUMOR: Primary | ICD-10-CM

## 2017-03-24 LAB
CYTO UR: NORMAL
LAB AP CASE REPORT: NORMAL
LAB AP CLINICAL INFORMATION: NORMAL
LAB AP INTRADEPARTMENTAL CONSULT: NORMAL
Lab: NORMAL
Lab: NORMAL
PATH REPORT.FINAL DX SPEC: NORMAL
PATH REPORT.GROSS SPEC: NORMAL

## 2017-03-24 PROCEDURE — 99024 POSTOP FOLLOW-UP VISIT: CPT | Performed by: OTOLARYNGOLOGY

## 2017-03-24 NOTE — PROGRESS NOTES
Patient Care Team:  Fredo Hererra MD as PCP - General  Fredo Herrera MD as PCP - Family Medicine  Armond Leon MD as Consulting Physician (Otolaryngology)    Chief Complaint   Patient presents with   • Post-op     Drain Removal - Parotidectomy 3/21/2017       Subjective   History of Present Illness:  Jn Cárdenas is a  61 y.o.  male who presents for follow up s/p left parotidectomy recently. The patient has had a relatively normal postoperative course and currently has no related complaints.    Review of Systems:  Review of Systems   Constitutional: Negative.  Negative for chills and fever.   HENT:        As per HPI   Eyes: Negative.    Respiratory: Negative for shortness of breath.    Cardiovascular: Negative for chest pain.   Gastrointestinal: Negative for nausea and vomiting.   Musculoskeletal: Negative for neck pain.   Allergic/Immunologic: Negative.    Neurological: Negative for facial asymmetry and numbness.   Hematological: Does not bruise/bleed easily.   Psychiatric/Behavioral: Negative.        Past History:  Past Medical History:   Diagnosis Date   • Arthritis    • Cataract    • Coronary artery disease    • Diabetes mellitus    • Disease of thyroid gland    • Gout    • Hypertension    • Myocardial infarct, old     24 yrs ago   • Neuropathy     in feet and legs   • Parotid tumor    • Spastic colon    • TIA (transient ischemic attack)     weakness on left upper extremity     Past Surgical History:   Procedure Laterality Date   • BACK SURGERY      had spinal fusion   • OTHER SURGICAL HISTORY      had goiter removed  at 4 yrs old   • OTHER SURGICAL HISTORY Right     had broken arm,   • PAROTIDECTOMY Left 3/21/2017    Procedure: PAROTID TUMOR EXCISION WITH FACIAL NERVE MONITORING,  DISSECTION LEFT;  Surgeon: Armond Leon MD;  Location: [facility];  Service:      History reviewed. No pertinent family history.  Social History   Substance Use Topics   • Smoking status: Never Smoker   •  Smokeless tobacco: Never Used   • Alcohol use No       Current Outpatient Prescriptions:   •  allopurinol (ZYLOPRIM) 300 MG tablet, Take 300 mg by mouth Daily. Pt takes 1/2 tablet in evening time daily, Disp: , Rfl:   •  baclofen (LIORESAL) 10 MG tablet, Take 10 mg by mouth Every Night. Can take up to 3 if needed nightly, Disp: , Rfl:   •  cephalexin (KEFLEX) 500 MG capsule, Take 1 capsule by mouth 4 (Four) Times a Day for 7 days., Disp: 28 capsule, Rfl: 0  •  CloNIDine (CATAPRES) 0.1 MG tablet, Take 0.1 mg by mouth 2 (Two) Times a Day., Disp: , Rfl:   •  Diphenhydramine-PSE-APAP (ALLERGY SINUS HEADACHE RELIEF PO), Take 1 tablet by mouth Every Night. May take 1-2 daily for sinus headache, Disp: , Rfl:   •  fluticasone (VERAMYST) 27.5 MCG/SPRAY nasal spray, 2 sprays into each nostril Daily., Disp: , Rfl:   •  gabapentin (NEURONTIN) 100 MG capsule, Take 100 mg by mouth 3 (Three) Times a Day As Needed. Pt takes 1-3 capsules at beddtime as needed currently, Disp: , Rfl:   •  HYDROcodone-acetaminophen (NORCO) 5-325 MG per tablet, Take 1-2 tablets by mouth Every 4 (Four) Hours As Needed (Pain)., Disp: 30 tablet, Rfl: 0  •  Insulin Detemir (LEVEMIR FLEXPEN SC), Inject 90 Units under the skin 2 (Two) Times a Day., Disp: , Rfl:   •  levothyroxine (SYNTHROID, LEVOTHROID) 112 MCG tablet, Take 112 mcg by mouth Daily., Disp: , Rfl:   •  Liraglutide (VICTOZA) 18 MG/3ML solution pen-injector, Inject 1.8 pens under the skin Every Night., Disp: , Rfl:   •  Magnesium 250 MG tablet, Take 1 tablet by mouth 2 (Two) Times a Day., Disp: , Rfl:   •  meloxicam (MOBIC) 7.5 MG tablet, Take 7.5 mg by mouth Daily., Disp: , Rfl:   •  metFORMIN (GLUCOPHAGE) 1000 MG tablet, Take 500 mg by mouth Daily With Breakfast., Disp: , Rfl:   •  Multiple Vitamins-Minerals (ALIVE MENS ENERGY) tablet, Take 1 tablet by mouth Daily., Disp: , Rfl:   •  NON FORMULARY, 1 application As Needed (is over the counter rollon applies to  legs prn  max freeze)., Disp: ,  Rfl:   •  Potassium 99 MG tablet, Take 1 tablet by mouth 2 (Two) Times a Day., Disp: , Rfl:   •  vitamin B-12 (CYANOCOBALAMIN) 1000 MCG tablet, Take 1,000 mcg by mouth 2 (Two) Times a Day., Disp: , Rfl:   Allergies:  Review of patient's allergies indicates no known allergies.    Objective     Vital Signs:  Temp:  [97.4 °F (36.3 °C)] 97.4 °F (36.3 °C)  Heart Rate:  [91] 91  BP: (142)/(90) 142/90    Physical Exam:  CONSTITUTIONAL: well nourished, well-developed, alert, oriented, in no acute distress   COMMUNICATION AND VOICE: able to communicate normally, normal voice quality  HEAD: normocephalic, no lesions, atraumatic, no tenderness, no masses   FACE: appearance normal, no lesions, no tenderness, no deformities, facial motion symmetric  EYES: ocular motility normal, eyelids normal, orbits normal, no proptosis, conjunctiva normal , pupils equal, round   EARS:  Hearing: hearing to conversational voice intact bilaterally   External Ears: normal bilaterally, no lesions  NOSE:  External Nose: external nasal structure normal, no tenderness on palpation, no nasal discharge, no lesions, no evidence of trauma, nostrils patent   ORAL:  Lips: upper and lower lips without lesion   NECK:  Inspection and Palpation: normal, healing parotidectomy incision; appropriate drainage noted; no signs of infection  CHEST/RESPIRATORY: normal respiratory effort   CARDIOVASCULAR: no cyanosis or edema   NEUROLOGICAL/PSYCHIATRIC: oriented to time, place and person, mood normal, affect appropriate, CN II-XII intact grossly    Results Review:   None.     Assessment   1. Parotid tumor, resolved   2.      S/p left parotidectomy    Plan   Continue current management plan.     -------MEDICATIONS:-------  continue previous medication as prescribed     -----INSTRUCTIONS-----  May resume driving 2 weeks post-operatively. Patient verbalized understanding.      -----FOLLOW UP-----  Return in about 4 weeks (around 4/21/2017) for s/p  parotidectomy.      Katharine Kat, APRN  03/24/17  10:18 AM

## 2017-03-27 ENCOUNTER — TELEPHONE (OUTPATIENT)
Dept: OTOLARYNGOLOGY | Facility: CLINIC | Age: 62
End: 2017-03-27

## 2017-03-27 NOTE — TELEPHONE ENCOUNTER
----- Message from Armond Leon MD sent at 3/26/2017  1:46 PM CDT -----  Call pt about benign pathology

## 2017-03-30 ENCOUNTER — ANTI-COAG VISIT (OUTPATIENT)
Dept: CARDIOLOGY | Age: 62
End: 2017-03-30
Payer: MEDICARE

## 2017-03-30 DIAGNOSIS — I48.0 PAF (PAROXYSMAL ATRIAL FIBRILLATION) (HCC): Primary | ICD-10-CM

## 2017-03-30 LAB
INTERNATIONAL NORMALIZATION RATIO, POC: 1.6
PROTHROMBIN TIME, POC: NORMAL

## 2017-03-30 PROCEDURE — 1036F TOBACCO NON-USER: CPT | Performed by: CLINICAL NURSE SPECIALIST

## 2017-03-30 PROCEDURE — 85610 PROTHROMBIN TIME: CPT | Performed by: CLINICAL NURSE SPECIALIST

## 2017-04-13 ENCOUNTER — ANTI-COAG VISIT (OUTPATIENT)
Dept: CARDIOLOGY | Age: 62
End: 2017-04-13
Payer: MEDICARE

## 2017-04-13 DIAGNOSIS — I48.0 PAF (PAROXYSMAL ATRIAL FIBRILLATION) (HCC): Primary | ICD-10-CM

## 2017-04-13 LAB
INTERNATIONAL NORMALIZATION RATIO, POC: 2.2
PROTHROMBIN TIME, POC: NORMAL

## 2017-04-13 PROCEDURE — 1036F TOBACCO NON-USER: CPT | Performed by: CLINICAL NURSE SPECIALIST

## 2017-04-13 PROCEDURE — 85610 PROTHROMBIN TIME: CPT | Performed by: CLINICAL NURSE SPECIALIST

## 2017-04-25 ENCOUNTER — OFFICE VISIT (OUTPATIENT)
Dept: OTOLARYNGOLOGY | Facility: CLINIC | Age: 62
End: 2017-04-25

## 2017-04-25 VITALS
WEIGHT: 236 LBS | HEIGHT: 73 IN | DIASTOLIC BLOOD PRESSURE: 87 MMHG | TEMPERATURE: 97.9 F | SYSTOLIC BLOOD PRESSURE: 132 MMHG | HEART RATE: 81 BPM | BODY MASS INDEX: 31.28 KG/M2

## 2017-04-25 DIAGNOSIS — D49.0 PAROTID TUMOR: Primary | ICD-10-CM

## 2017-04-25 PROCEDURE — 99024 POSTOP FOLLOW-UP VISIT: CPT | Performed by: NURSE PRACTITIONER

## 2017-04-25 NOTE — PROGRESS NOTES
Patient Care Team:  Fredo Herrera MD as PCP - General  Fredo Herrera MD as PCP - Family Medicine  Armond Leon MD as Consulting Physician (Otolaryngology)    Chief Complaint   Patient presents with   • Post-op     Parotidectomy 3/21/2017       Subjective   History of Present Illness:  Jn Cárdenas is a  61 y.o.  male who presents for follow up s/p left parotidectomy recently. The patient has had a relatively normal postoperative course and currently has no related complaints.    Review of Systems:  Review of Systems   Constitutional: Negative for chills and fever.   HENT:        See HPI   Eyes: Negative.    Respiratory: Negative.    Cardiovascular: Negative.    Gastrointestinal: Negative for nausea and vomiting.   Allergic/Immunologic: Negative.    Neurological: Negative.    Hematological: Negative.    Psychiatric/Behavioral: Negative.        Past History:  Past Medical History:   Diagnosis Date   • Arthritis    • Cataract    • Coronary artery disease    • Diabetes mellitus    • Disease of thyroid gland    • Gout    • Hypertension    • Myocardial infarct, old     24 yrs ago   • Neuropathy     in feet and legs   • Parotid tumor    • Spastic colon    • TIA (transient ischemic attack)     weakness on left upper extremity     Past Surgical History:   Procedure Laterality Date   • BACK SURGERY      had spinal fusion   • OTHER SURGICAL HISTORY      had goiter removed  at 4 yrs old   • OTHER SURGICAL HISTORY Right     had broken arm,   • PAROTIDECTOMY Left 3/21/2017    Procedure: PAROTID TUMOR EXCISION WITH FACIAL NERVE MONITORING,  DISSECTION LEFT;  Surgeon: Armond Leon MD;  Location: [facility];  Service:      History reviewed. No pertinent family history.  Social History   Substance Use Topics   • Smoking status: Never Smoker   • Smokeless tobacco: Never Used   • Alcohol use No       Current Outpatient Prescriptions:   •  allopurinol (ZYLOPRIM) 300 MG tablet, Take 300 mg by mouth Daily. Pt  takes 1/2 tablet in evening time daily, Disp: , Rfl:   •  baclofen (LIORESAL) 10 MG tablet, Take 10 mg by mouth Every Night. Can take up to 3 if needed nightly, Disp: , Rfl:   •  CloNIDine (CATAPRES) 0.1 MG tablet, Take 0.1 mg by mouth 2 (Two) Times a Day., Disp: , Rfl:   •  Diphenhydramine-PSE-APAP (ALLERGY SINUS HEADACHE RELIEF PO), Take 1 tablet by mouth Every Night. May take 1-2 daily for sinus headache, Disp: , Rfl:   •  fluticasone (VERAMYST) 27.5 MCG/SPRAY nasal spray, 2 sprays into each nostril Daily., Disp: , Rfl:   •  gabapentin (NEURONTIN) 100 MG capsule, Take 100 mg by mouth 3 (Three) Times a Day As Needed. Pt takes 1-3 capsules at beddtime as needed currently, Disp: , Rfl:   •  Insulin Detemir (LEVEMIR FLEXPEN SC), Inject 90 Units under the skin 2 (Two) Times a Day., Disp: , Rfl:   •  levothyroxine (SYNTHROID, LEVOTHROID) 112 MCG tablet, Take 112 mcg by mouth Daily., Disp: , Rfl:   •  Liraglutide (VICTOZA) 18 MG/3ML solution pen-injector, Inject 1.8 pens under the skin Every Night., Disp: , Rfl:   •  Magnesium 250 MG tablet, Take 1 tablet by mouth 2 (Two) Times a Day., Disp: , Rfl:   •  meloxicam (MOBIC) 7.5 MG tablet, Take 7.5 mg by mouth Daily., Disp: , Rfl:   •  metFORMIN (GLUCOPHAGE) 1000 MG tablet, Take 500 mg by mouth Daily With Breakfast., Disp: , Rfl:   •  Multiple Vitamins-Minerals (ALIVE MENS ENERGY) tablet, Take 1 tablet by mouth Daily., Disp: , Rfl:   •  NON FORMULARY, 1 application As Needed (is over the counter rollon applies to  legs prn  max freeze)., Disp: , Rfl:   •  Potassium 99 MG tablet, Take 1 tablet by mouth 2 (Two) Times a Day., Disp: , Rfl:   •  vitamin B-12 (CYANOCOBALAMIN) 1000 MCG tablet, Take 1,000 mcg by mouth 2 (Two) Times a Day., Disp: , Rfl:   Allergies:  Review of patient's allergies indicates no known allergies.    Objective     Vital Signs:  Temp:  [97.9 °F (36.6 °C)] 97.9 °F (36.6 °C)  Heart Rate:  [81] 81  BP: (132)/(87) 132/87    Physical Exam:  CONSTITUTIONAL:  well nourished, well-developed, alert, oriented, in no acute distress   COMMUNICATION AND VOICE: able to communicate normally, normal voice quality  HEAD: normocephalic, no lesions, atraumatic, no tenderness, no masses   FACE: appearance normal, no lesions, no tenderness, no deformities, facial motion symmetric  EYES: ocular motility normal, eyelids normal, orbits normal, no proptosis, conjunctiva normal , pupils equal, round   EARS:  Hearing: hearing to conversational voice intact bilaterally   External Ears: normal bilaterally, no lesions  NOSE:  External Nose: external nasal structure normal, no tenderness on palpation, no nasal discharge, no lesions, no evidence of trauma, nostrils patent   ORAL:  Lips: upper and lower lips without lesion   NECK:  Inspection and Palpation: neck appearance normal, no masses or tenderness; there is a well-healed left parotidectomy incision  CHEST/RESPIRATORY: normal respiratory effort   CARDIOVASCULAR: no cyanosis or edema   NEUROLOGICAL/PSYCHIATRIC: oriented to time, place and person, mood normal, affect appropriate, CN II-XII intact grossly    Results Review:   Pathology reviewed- benign.    Assessment   1. Parotid tumor        Plan   Continue current management plan.     -------MEDICATIONS:-------  Continue previous medication as prescribed.     -----FOLLOW UP-----  Return if symptoms worsen or fail to improve.      Katharine Kat, APRSUNNY  04/25/17  9:30 AM

## 2017-05-15 ENCOUNTER — TELEPHONE (OUTPATIENT)
Dept: CARDIOLOGY | Age: 62
End: 2017-05-15

## 2017-05-18 ENCOUNTER — OFFICE VISIT (OUTPATIENT)
Dept: CARDIOLOGY | Age: 62
End: 2017-05-18
Payer: MEDICARE

## 2017-05-18 VITALS
DIASTOLIC BLOOD PRESSURE: 78 MMHG | BODY MASS INDEX: 30.16 KG/M2 | HEIGHT: 74 IN | WEIGHT: 235 LBS | SYSTOLIC BLOOD PRESSURE: 110 MMHG | HEART RATE: 70 BPM

## 2017-05-18 DIAGNOSIS — I48.0 PAF (PAROXYSMAL ATRIAL FIBRILLATION) (HCC): ICD-10-CM

## 2017-05-18 DIAGNOSIS — I42.9 CARDIOMYOPATHY (HCC): ICD-10-CM

## 2017-05-18 DIAGNOSIS — E78.2 MIXED HYPERLIPIDEMIA: ICD-10-CM

## 2017-05-18 DIAGNOSIS — I10 ESSENTIAL HYPERTENSION: ICD-10-CM

## 2017-05-18 DIAGNOSIS — Z79.01 CHRONIC ANTICOAGULATION: ICD-10-CM

## 2017-05-18 DIAGNOSIS — I25.10 CORONARY ARTERY DISEASE INVOLVING NATIVE CORONARY ARTERY OF NATIVE HEART WITHOUT ANGINA PECTORIS: Primary | ICD-10-CM

## 2017-05-18 LAB
INTERNATIONAL NORMALIZATION RATIO, POC: 1.8
PROTHROMBIN TIME, POC: NORMAL

## 2017-05-18 PROCEDURE — 99213 OFFICE O/P EST LOW 20 MIN: CPT | Performed by: NURSE PRACTITIONER

## 2017-05-18 PROCEDURE — 1036F TOBACCO NON-USER: CPT | Performed by: NURSE PRACTITIONER

## 2017-05-18 PROCEDURE — G8417 CALC BMI ABV UP PARAM F/U: HCPCS | Performed by: NURSE PRACTITIONER

## 2017-05-18 PROCEDURE — G8598 ASA/ANTIPLAT THER USED: HCPCS | Performed by: NURSE PRACTITIONER

## 2017-05-18 PROCEDURE — G8427 DOCREV CUR MEDS BY ELIG CLIN: HCPCS | Performed by: NURSE PRACTITIONER

## 2017-05-18 PROCEDURE — 85610 PROTHROMBIN TIME: CPT | Performed by: NURSE PRACTITIONER

## 2017-05-18 PROCEDURE — 3017F COLORECTAL CA SCREEN DOC REV: CPT | Performed by: NURSE PRACTITIONER

## 2017-06-15 ENCOUNTER — ANTI-COAG VISIT (OUTPATIENT)
Dept: CARDIOLOGY | Age: 62
End: 2017-06-15
Payer: MEDICARE

## 2017-06-15 DIAGNOSIS — I48.0 PAF (PAROXYSMAL ATRIAL FIBRILLATION) (HCC): Primary | ICD-10-CM

## 2017-06-15 LAB
INTERNATIONAL NORMALIZATION RATIO, POC: 2.2
PROTHROMBIN TIME, POC: NORMAL

## 2017-06-15 PROCEDURE — 85610 PROTHROMBIN TIME: CPT | Performed by: NURSE PRACTITIONER

## 2017-06-15 PROCEDURE — 1036F TOBACCO NON-USER: CPT | Performed by: NURSE PRACTITIONER

## 2017-06-22 ENCOUNTER — ANTI-COAG VISIT (OUTPATIENT)
Dept: CARDIOLOGY | Age: 62
End: 2017-06-22
Payer: MEDICARE

## 2017-06-22 DIAGNOSIS — I48.0 PAF (PAROXYSMAL ATRIAL FIBRILLATION) (HCC): Primary | ICD-10-CM

## 2017-06-22 LAB
INTERNATIONAL NORMALIZATION RATIO, POC: 2.7
PROTHROMBIN TIME, POC: NORMAL

## 2017-06-22 PROCEDURE — 1036F TOBACCO NON-USER: CPT | Performed by: CLINICAL NURSE SPECIALIST

## 2017-06-22 PROCEDURE — 85610 PROTHROMBIN TIME: CPT | Performed by: CLINICAL NURSE SPECIALIST

## 2017-07-13 ENCOUNTER — ANTI-COAG VISIT (OUTPATIENT)
Dept: CARDIOLOGY | Age: 62
End: 2017-07-13
Payer: MEDICARE

## 2017-07-13 DIAGNOSIS — I48.0 PAF (PAROXYSMAL ATRIAL FIBRILLATION) (HCC): Primary | ICD-10-CM

## 2017-07-13 LAB
INTERNATIONAL NORMALIZATION RATIO, POC: 2.3
PROTHROMBIN TIME, POC: NORMAL

## 2017-07-13 PROCEDURE — 1036F TOBACCO NON-USER: CPT | Performed by: CLINICAL NURSE SPECIALIST

## 2017-07-13 PROCEDURE — 85610 PROTHROMBIN TIME: CPT | Performed by: CLINICAL NURSE SPECIALIST

## 2017-07-14 ENCOUNTER — TELEPHONE (OUTPATIENT)
Dept: CARDIOLOGY | Age: 62
End: 2017-07-14

## 2017-07-17 ENCOUNTER — TELEPHONE (OUTPATIENT)
Dept: CARDIOLOGY | Age: 62
End: 2017-07-17

## 2017-08-07 DIAGNOSIS — I42.9 CARDIOMYOPATHY (HCC): ICD-10-CM

## 2017-08-07 RX ORDER — WARFARIN SODIUM 5 MG/1
TABLET ORAL
Qty: 180 TABLET | Refills: 3 | Status: SHIPPED | OUTPATIENT
Start: 2017-08-07 | End: 2018-12-01 | Stop reason: SDUPTHER

## 2017-08-25 ENCOUNTER — ANTI-COAG VISIT (OUTPATIENT)
Dept: CARDIOLOGY | Age: 62
End: 2017-08-25
Payer: MEDICARE

## 2017-08-25 DIAGNOSIS — I48.0 PAF (PAROXYSMAL ATRIAL FIBRILLATION) (HCC): Primary | ICD-10-CM

## 2017-08-25 LAB
INTERNATIONAL NORMALIZATION RATIO, POC: 2.5
PROTHROMBIN TIME, POC: NORMAL

## 2017-08-25 PROCEDURE — 85610 PROTHROMBIN TIME: CPT | Performed by: CLINICAL NURSE SPECIALIST

## 2017-08-25 PROCEDURE — 1036F TOBACCO NON-USER: CPT | Performed by: CLINICAL NURSE SPECIALIST

## 2017-09-19 ENCOUNTER — OFFICE VISIT (OUTPATIENT)
Dept: CARDIOLOGY | Age: 62
End: 2017-09-19
Payer: MEDICARE

## 2017-09-19 VITALS
WEIGHT: 226 LBS | DIASTOLIC BLOOD PRESSURE: 78 MMHG | SYSTOLIC BLOOD PRESSURE: 118 MMHG | HEIGHT: 74 IN | BODY MASS INDEX: 29 KG/M2 | HEART RATE: 78 BPM

## 2017-09-19 DIAGNOSIS — E78.2 MIXED HYPERLIPIDEMIA: ICD-10-CM

## 2017-09-19 DIAGNOSIS — I48.0 PAF (PAROXYSMAL ATRIAL FIBRILLATION) (HCC): ICD-10-CM

## 2017-09-19 DIAGNOSIS — I25.10 CORONARY ARTERY DISEASE INVOLVING NATIVE CORONARY ARTERY OF NATIVE HEART WITHOUT ANGINA PECTORIS: ICD-10-CM

## 2017-09-19 DIAGNOSIS — I42.0 DILATED CARDIOMYOPATHY (HCC): Primary | ICD-10-CM

## 2017-09-19 DIAGNOSIS — I10 ESSENTIAL HYPERTENSION: ICD-10-CM

## 2017-09-19 PROCEDURE — G8427 DOCREV CUR MEDS BY ELIG CLIN: HCPCS | Performed by: INTERNAL MEDICINE

## 2017-09-19 PROCEDURE — 99213 OFFICE O/P EST LOW 20 MIN: CPT | Performed by: INTERNAL MEDICINE

## 2017-09-19 PROCEDURE — 1036F TOBACCO NON-USER: CPT | Performed by: INTERNAL MEDICINE

## 2017-09-19 PROCEDURE — G8598 ASA/ANTIPLAT THER USED: HCPCS | Performed by: INTERNAL MEDICINE

## 2017-09-19 PROCEDURE — 3017F COLORECTAL CA SCREEN DOC REV: CPT | Performed by: INTERNAL MEDICINE

## 2017-09-19 PROCEDURE — G8417 CALC BMI ABV UP PARAM F/U: HCPCS | Performed by: INTERNAL MEDICINE

## 2017-09-29 ENCOUNTER — ANTI-COAG VISIT (OUTPATIENT)
Dept: CARDIOLOGY | Age: 62
End: 2017-09-29
Payer: MEDICARE

## 2017-09-29 DIAGNOSIS — I48.0 PAF (PAROXYSMAL ATRIAL FIBRILLATION) (HCC): Primary | ICD-10-CM

## 2017-09-29 LAB
INTERNATIONAL NORMALIZATION RATIO, POC: 2.4
PROTHROMBIN TIME, POC: NORMAL

## 2017-09-29 PROCEDURE — 85610 PROTHROMBIN TIME: CPT | Performed by: CLINICAL NURSE SPECIALIST

## 2017-09-29 PROCEDURE — 1036F TOBACCO NON-USER: CPT | Performed by: CLINICAL NURSE SPECIALIST

## 2017-11-10 ENCOUNTER — ANTI-COAG VISIT (OUTPATIENT)
Dept: CARDIOLOGY | Age: 62
End: 2017-11-10
Payer: MEDICARE

## 2017-11-10 DIAGNOSIS — I48.0 PAF (PAROXYSMAL ATRIAL FIBRILLATION) (HCC): Primary | ICD-10-CM

## 2017-11-10 LAB
INTERNATIONAL NORMALIZATION RATIO, POC: 2.5
PROTHROMBIN TIME, POC: NORMAL

## 2017-11-10 PROCEDURE — 85610 PROTHROMBIN TIME: CPT | Performed by: CLINICAL NURSE SPECIALIST

## 2017-12-22 ENCOUNTER — ANTI-COAG VISIT (OUTPATIENT)
Dept: CARDIOLOGY | Age: 62
End: 2017-12-22
Payer: MEDICARE

## 2017-12-22 DIAGNOSIS — I48.0 PAF (PAROXYSMAL ATRIAL FIBRILLATION) (HCC): Primary | ICD-10-CM

## 2017-12-22 LAB
INTERNATIONAL NORMALIZATION RATIO, POC: 2.4
PROTHROMBIN TIME, POC: NORMAL

## 2017-12-22 PROCEDURE — 85610 PROTHROMBIN TIME: CPT | Performed by: CLINICAL NURSE SPECIALIST

## 2018-01-26 ENCOUNTER — ANTI-COAG VISIT (OUTPATIENT)
Dept: CARDIOLOGY | Age: 63
End: 2018-01-26
Payer: MEDICARE

## 2018-01-26 DIAGNOSIS — I48.0 PAF (PAROXYSMAL ATRIAL FIBRILLATION) (HCC): Primary | ICD-10-CM

## 2018-01-26 LAB
INTERNATIONAL NORMALIZATION RATIO, POC: 2.5
PROTHROMBIN TIME, POC: NORMAL

## 2018-01-26 PROCEDURE — 85610 PROTHROMBIN TIME: CPT | Performed by: CLINICAL NURSE SPECIALIST

## 2018-03-09 ENCOUNTER — ANTI-COAG VISIT (OUTPATIENT)
Dept: CARDIOLOGY | Age: 63
End: 2018-03-09
Payer: MEDICARE

## 2018-03-09 DIAGNOSIS — I48.0 PAF (PAROXYSMAL ATRIAL FIBRILLATION) (HCC): Primary | ICD-10-CM

## 2018-03-09 LAB
INTERNATIONAL NORMALIZATION RATIO, POC: 2.4
PROTHROMBIN TIME, POC: NORMAL

## 2018-03-09 PROCEDURE — 85610 PROTHROMBIN TIME: CPT | Performed by: CLINICAL NURSE SPECIALIST

## 2018-03-20 ENCOUNTER — OFFICE VISIT (OUTPATIENT)
Dept: CARDIOLOGY | Age: 63
End: 2018-03-20
Payer: MEDICARE

## 2018-03-20 VITALS
WEIGHT: 209 LBS | DIASTOLIC BLOOD PRESSURE: 70 MMHG | HEIGHT: 74 IN | SYSTOLIC BLOOD PRESSURE: 122 MMHG | HEART RATE: 78 BPM | BODY MASS INDEX: 26.82 KG/M2

## 2018-03-20 DIAGNOSIS — I42.0 DILATED CARDIOMYOPATHY (HCC): ICD-10-CM

## 2018-03-20 DIAGNOSIS — I25.10 CORONARY ARTERY DISEASE INVOLVING NATIVE CORONARY ARTERY OF NATIVE HEART WITHOUT ANGINA PECTORIS: ICD-10-CM

## 2018-03-20 DIAGNOSIS — I48.0 PAF (PAROXYSMAL ATRIAL FIBRILLATION) (HCC): Primary | ICD-10-CM

## 2018-03-20 LAB
INTERNATIONAL NORMALIZATION RATIO, POC: 2.4
PROTHROMBIN TIME, POC: NORMAL

## 2018-03-20 PROCEDURE — 3017F COLORECTAL CA SCREEN DOC REV: CPT | Performed by: CLINICAL NURSE SPECIALIST

## 2018-03-20 PROCEDURE — G8598 ASA/ANTIPLAT THER USED: HCPCS | Performed by: CLINICAL NURSE SPECIALIST

## 2018-03-20 PROCEDURE — 99213 OFFICE O/P EST LOW 20 MIN: CPT | Performed by: CLINICAL NURSE SPECIALIST

## 2018-03-20 PROCEDURE — 85610 PROTHROMBIN TIME: CPT | Performed by: CLINICAL NURSE SPECIALIST

## 2018-03-20 PROCEDURE — 93000 ELECTROCARDIOGRAM COMPLETE: CPT | Performed by: CLINICAL NURSE SPECIALIST

## 2018-03-20 PROCEDURE — G8427 DOCREV CUR MEDS BY ELIG CLIN: HCPCS | Performed by: CLINICAL NURSE SPECIALIST

## 2018-03-20 PROCEDURE — 1036F TOBACCO NON-USER: CPT | Performed by: CLINICAL NURSE SPECIALIST

## 2018-03-20 PROCEDURE — G8419 CALC BMI OUT NRM PARAM NOF/U: HCPCS | Performed by: CLINICAL NURSE SPECIALIST

## 2018-03-20 PROCEDURE — G8484 FLU IMMUNIZE NO ADMIN: HCPCS | Performed by: CLINICAL NURSE SPECIALIST

## 2018-03-20 ASSESSMENT — ENCOUNTER SYMPTOMS
NAUSEA: 0
BLOOD IN STOOL: 0
VOMITING: 0
HEARTBURN: 0
ORTHOPNEA: 0
SHORTNESS OF BREATH: 0
COUGH: 0
BLURRED VISION: 0

## 2018-03-20 NOTE — PROGRESS NOTES
[facility], [address] [place] [address]  Phone: [phone]  Fax: [phone]    OFFICE VISIT:  3/20/2018    Cynthia Morton - : 1955    Reason For Visit:  Myles Machado is a 58 y.o. male who is here for follow-up for paroxysmal atrial fibrillation and non-ischemic cardiomyopathy    HPI   Patient is here for follow-up today with a history of paroxysmal atrial fibrillation and nonischemic cardiomyopathy. His last ejection fraction was 49% per echo. He denies any palpitations or fast heart rate. He is anticoagulated with Coumadin and denies any bleeding issues. His Coumadin levels stayed fairly stable. He denies any chest pain, unusual dyspnea, orthopnea, PND, edema, or sudden weight gain. Falguni Luo MD is PCP. Mehreen Wynnnacho has the following history as recorded in [facility]:    Patient Active Problem List    Diagnosis Date Noted    Syncope 2012     Priority: High    Cardiomyopathy ([address] 2011     Priority: High    Chronic anticoagulation 2017    Pre-operative clearance 2015    PAF (paroxysmal atrial fibrillation) ([address] 2015    MI (myocardial infarction)     CAD (coronary artery disease)     Gout 2012    Diabetes mellitus ([address]     Cerebral artery occlusion with cerebral infarction ([address]     Hyperlipidemia     Hypertension      Past Medical History:   Diagnosis Date    CAD (coronary artery disease)     Cardiomyopathy ([address] 2011    Diabetes mellitus ([address]     Type 2    Gout 2012    Hypercholesteremia     Hyperlipidemia     Hypertension     MI (myocardial infarction)     Other chest pain     history of conversion reaction.  Other primary cardiomyopathies     Syncope 2012    history of near syncopal episodes.  Unspecified cerebral artery occlusion with cerebral infarction     history of stroke.      Past Surgical History:   Procedure Laterality Date    BACK SURGERY      CARDIAC CATHETERIZATION  02/21/06  [facility]s [facility] beds [facility]    Left heart cath    CARDIAC CATHETERIZATION  02/21/2001    EF is 75%. See scanned document. [address] SURGERY  05/26/06    Loop recorder placed     Family History   Problem Relation Age of Onset    Heart Disease Mother     High Blood Pressure Mother     Heart Disease Father     High Blood Pressure Father     Cancer Father     Heart Disease Brother      Social History   Substance Use Topics    Smoking status: Never Smoker    Smokeless tobacco: Never Used    Alcohol use No      Current Outpatient Prescriptions   Medication Sig Dispense Refill    dapagliflozin (FARXIGA) 10 MG tablet Take 10 mg by mouth every morning      Multiple Vitamins-Minerals (MULTIVITAMIN ADULT PO) Take by mouth daily      NOVOLIN N 100 UNIT/ML injection vial 100 Units      NOVOLIN 70/30 (70-30) 100 UNIT/ML injection vial       cloNIDine (CATAPRES) 0.1 MG tablet Take 1 tablet by mouth daily 60 tablet 3    warfarin (COUMADIN) 5 MG tablet TAKE ONE TO TWO TABLETS BY MOUTH ONCE DAILY 180 tablet 3    VICTOZA 18 MG/3ML SOPN SC injection       meloxicam (MOBIC) 7.5 MG tablet Take 7.5 mg by mouth daily       fluticasone (FLONASE) 50 MCG/ACT nasal spray 50 sprays by Nasal route 2 times daily.  Magnesium 500 MG CAPS Take 500 mg by mouth 2 times daily       allopurinol (ZYLOPRIM) 300 MG tablet Take 150 mg by mouth daily.  baclofen (LIORESAL) 10 MG tablet Take 10 mg by mouth Three times daily as needed.  levothyroxine (SYNTHROID) 88 MCG tablet Take 88 mcg by mouth daily. No current facility-administered medications for this visit. Allergies: Patient has no known allergies. Review of Systems  Review of Systems   Constitutional: Negative for chills, fever and malaise/fatigue. HENT: Negative for nosebleeds. Eyes: Negative for blurred vision. Respiratory: Negative for cough and shortness of breath. Cardiovascular: Positive for palpitations (rare, brief).  Negative for chest pain, orthopnea, leg swelling and PND. Gastrointestinal: Negative for blood in stool, heartburn, melena, nausea and vomiting. Musculoskeletal: Negative for falls and myalgias. Skin: Negative for rash. Neurological: Negative for dizziness, sensory change, speech change and focal weakness. Endo/Heme/Allergies: Does not bruise/bleed easily. Psychiatric/Behavioral: Negative for depression. Objective  Vital Signs - /70   Pulse 78   Ht 6' 2\" (1.88 m)   Wt 209 lb (94.8 kg)   BMI 26.83 kg/m²   Physical Exam   Constitutional: He is oriented to person, place, and time. He appears well-developed and well-nourished. No distress. HENT:   Head: Normocephalic and atraumatic. Eyes: Pupils are equal, round, and reactive to light. Right eye exhibits no discharge. Left eye exhibits no discharge. Neck: No JVD present. No tracheal deviation present. Cardiovascular: Normal rate, regular rhythm and intact distal pulses. Exam reveals no gallop and no friction rub. Murmur (1/6 systolic murmur) heard. No carotid bruit   Pulmonary/Chest: Effort normal and breath sounds normal. No respiratory distress. He has no wheezes. He has no rales. Abdominal: Soft. There is no tenderness. Musculoskeletal: He exhibits no edema. Normal gait and station   Neurological: He is alert and oriented to person, place, and time. No cranial nerve deficit. Skin: Skin is warm and dry. No rash noted. Psychiatric: He has a normal mood and affect. His behavior is normal. Judgment normal.   Nursing note and vitals reviewed. Assessment:    1. PAF (paroxysmal atrial fibrillation) ([address]   Well controlled on current therapy without recurrence. He remains anticoagulated with Coumadin and there are no bleeding issues     2. Dilated cardiomyopathy ([address]  Last EF 49%. He appears euvolemic     3.  Coronary artery disease involving native coronary artery of native heart without angina pectoris   Mild, nonocclusive without

## 2018-03-20 NOTE — PATIENT INSTRUCTIONS
Followup With Dr. Olivia Bolaños  In Sept  Call for palpitations  Continue normal dosing Coumadin and recheck in 6 weeks   Call with any questions or concerns  Follow up with Sarabjit Ch MD for non cardiac problems  Report any new problems  Cardiovascular Fitness-Exercise as tolerated. Strive for 15 minutes of exercise most days of the week. Cardiac / Healthy Diet  Continue current medications as directed  Continue plan of treatment  It is always recommended that you bring your medications bottles with you to each visit - this is for your safety! Patient Education        Atrial Fibrillation: Care Instructions  Your Care Instructions    Atrial fibrillation is an irregular and often fast heartbeat. Treating this condition is important for several reasons. It can cause blood clots, which can travel from your heart to your brain and cause a stroke. If you have a fast heartbeat, you may feel lightheaded, dizzy, and weak. An irregular heartbeat can also increase your risk for heart failure. Atrial fibrillation is often the result of another heart condition, such as high blood pressure or coronary artery disease. Making changes to improve your heart condition will help you stay healthy and active. Follow-up care is a key part of your treatment and safety. Be sure to make and go to all appointments, and call your doctor if you are having problems. It's also a good idea to know your test results and keep a list of the medicines you take. How can you care for yourself at home? Medicines  ? · Take your medicines exactly as prescribed. Call your doctor if you think you are having a problem with your medicine. You will get more details on the specific medicines your doctor prescribes. ? · If your doctor has given you a blood thinner to prevent a stroke, be sure you get instructions about how to take your medicine safely. Blood thinners can cause serious bleeding problems.    ? · Do not take any vitamins, emergency care. For example, call if:  ? · You have symptoms of a heart attack. These may include:  ¨ Chest pain or pressure, or a strange feeling in the chest.  ¨ Sweating. ¨ Shortness of breath. ¨ Nausea or vomiting. ¨ Pain, pressure, or a strange feeling in the back, neck, jaw, or upper belly or in one or both shoulders or arms. ¨ Lightheadedness or sudden weakness. ¨ A fast or irregular heartbeat. After you call 911, the  may tell you to chew 1 adult-strength or 2 to 4 low-dose aspirin. Wait for an ambulance. Do not try to drive yourself. ? · You have symptoms of a stroke. These may include:  ¨ Sudden numbness, tingling, weakness, or loss of movement in your face, arm, or leg, especially on only one side of your body. ¨ Sudden vision changes. ¨ Sudden trouble speaking. ¨ Sudden confusion or trouble understanding simple statements. ¨ Sudden problems with walking or balance. ¨ A sudden, severe headache that is different from past headaches. ? · You passed out (lost consciousness). ?Call your doctor now or seek immediate medical care if:  ? · You have new or increased shortness of breath. ? · You feel dizzy or lightheaded, or you feel like you may faint. ? · Your heart rate becomes irregular. ? · You can feel your heart flutter in your chest or skip heartbeats. Tell your doctor if these symptoms are new or worse. ? Watch closely for changes in your health, and be sure to contact your doctor if you have any problems. Where can you learn more? Go to [email or web address]://[email or web address] org and sign in to your [employer] account. Enter U020 in the [facility] box to learn more about \"Atrial Fibrillation: Care Instructions. \"     If you do not have an account, please click on the \"Sign Up Now\" link. Current as of: September 21, 2016  Content Version: 11.5  © 0845-6079 Healthwise, Incorporated. Care instructions adapted under license by [facility] ([facility]).  If you have questions about a medical condition or this instruction, always ask your healthcare professional. [place] [address] any warranty or liability for your use of this information.

## 2018-05-04 ENCOUNTER — ANTI-COAG VISIT (OUTPATIENT)
Dept: CARDIOLOGY | Age: 63
End: 2018-05-04
Payer: MEDICARE

## 2018-05-04 DIAGNOSIS — I48.0 PAF (PAROXYSMAL ATRIAL FIBRILLATION) (HCC): Primary | ICD-10-CM

## 2018-05-04 LAB
INTERNATIONAL NORMALIZATION RATIO, POC: 2.3
PROTHROMBIN TIME, POC: NORMAL

## 2018-05-04 PROCEDURE — 85610 PROTHROMBIN TIME: CPT | Performed by: CLINICAL NURSE SPECIALIST

## 2018-06-15 ENCOUNTER — ANTI-COAG VISIT (OUTPATIENT)
Dept: CARDIOLOGY | Age: 63
End: 2018-06-15
Payer: MEDICARE

## 2018-06-15 DIAGNOSIS — I48.0 PAF (PAROXYSMAL ATRIAL FIBRILLATION) (HCC): Primary | ICD-10-CM

## 2018-06-15 LAB
INTERNATIONAL NORMALIZATION RATIO, POC: 2.6
PROTHROMBIN TIME, POC: NORMAL

## 2018-06-15 PROCEDURE — 85610 PROTHROMBIN TIME: CPT | Performed by: CLINICAL NURSE SPECIALIST

## 2018-06-21 LAB
AVERAGE GLUCOSE: NORMAL
HBA1C MFR BLD: 5.7 %

## 2018-07-27 ENCOUNTER — ANTI-COAG VISIT (OUTPATIENT)
Dept: CARDIOLOGY | Age: 63
End: 2018-07-27
Payer: MEDICARE

## 2018-07-27 DIAGNOSIS — I48.0 PAF (PAROXYSMAL ATRIAL FIBRILLATION) (HCC): Primary | ICD-10-CM

## 2018-07-27 LAB
INTERNATIONAL NORMALIZATION RATIO, POC: 2
PROTHROMBIN TIME, POC: NORMAL

## 2018-07-27 PROCEDURE — 85610 PROTHROMBIN TIME: CPT | Performed by: CLINICAL NURSE SPECIALIST

## 2018-09-07 ENCOUNTER — ANTI-COAG VISIT (OUTPATIENT)
Dept: CARDIOLOGY | Age: 63
End: 2018-09-07
Payer: MEDICARE

## 2018-09-07 DIAGNOSIS — I48.0 PAF (PAROXYSMAL ATRIAL FIBRILLATION) (HCC): Primary | ICD-10-CM

## 2018-09-07 LAB
INTERNATIONAL NORMALIZATION RATIO, POC: 3.4
PROTHROMBIN TIME, POC: NORMAL

## 2018-09-07 PROCEDURE — 85610 PROTHROMBIN TIME: CPT | Performed by: CLINICAL NURSE SPECIALIST

## 2018-09-25 ENCOUNTER — OFFICE VISIT (OUTPATIENT)
Dept: CARDIOLOGY | Age: 63
End: 2018-09-25
Payer: MEDICARE

## 2018-09-25 VITALS
HEART RATE: 60 BPM | HEIGHT: 74 IN | BODY MASS INDEX: 27.34 KG/M2 | DIASTOLIC BLOOD PRESSURE: 90 MMHG | WEIGHT: 213 LBS | SYSTOLIC BLOOD PRESSURE: 140 MMHG

## 2018-09-25 DIAGNOSIS — I25.10 CORONARY ARTERY DISEASE INVOLVING NATIVE CORONARY ARTERY OF NATIVE HEART WITHOUT ANGINA PECTORIS: Primary | ICD-10-CM

## 2018-09-25 DIAGNOSIS — E78.2 MIXED HYPERLIPIDEMIA: ICD-10-CM

## 2018-09-25 DIAGNOSIS — I48.0 PAF (PAROXYSMAL ATRIAL FIBRILLATION) (HCC): ICD-10-CM

## 2018-09-25 DIAGNOSIS — I10 ESSENTIAL HYPERTENSION: ICD-10-CM

## 2018-09-25 DIAGNOSIS — Z79.01 CHRONIC ANTICOAGULATION: ICD-10-CM

## 2018-09-25 LAB
INTERNATIONAL NORMALIZATION RATIO, POC: 2.9
PROTHROMBIN TIME, POC: NORMAL

## 2018-09-25 PROCEDURE — G8419 CALC BMI OUT NRM PARAM NOF/U: HCPCS | Performed by: NURSE PRACTITIONER

## 2018-09-25 PROCEDURE — 99214 OFFICE O/P EST MOD 30 MIN: CPT | Performed by: NURSE PRACTITIONER

## 2018-09-25 PROCEDURE — 85610 PROTHROMBIN TIME: CPT | Performed by: NURSE PRACTITIONER

## 2018-09-25 PROCEDURE — G8427 DOCREV CUR MEDS BY ELIG CLIN: HCPCS | Performed by: NURSE PRACTITIONER

## 2018-09-25 PROCEDURE — 3017F COLORECTAL CA SCREEN DOC REV: CPT | Performed by: NURSE PRACTITIONER

## 2018-09-25 RX ORDER — GLIMEPIRIDE 2 MG/1
2 TABLET ORAL
COMMUNITY
End: 2019-03-25 | Stop reason: ALTCHOICE

## 2018-09-25 RX ORDER — CLONIDINE HYDROCHLORIDE 0.1 MG/1
0.1 TABLET ORAL DAILY
Qty: 90 TABLET | Refills: 3 | Status: SHIPPED | OUTPATIENT
Start: 2018-09-25

## 2018-09-25 RX ORDER — GABAPENTIN 100 MG/1
100 CAPSULE ORAL 3 TIMES DAILY PRN
COMMUNITY

## 2018-09-25 RX ORDER — LANOLIN ALCOHOL/MO/W.PET/CERES
1000 CREAM (GRAM) TOPICAL 2 TIMES DAILY
COMMUNITY

## 2018-09-25 RX ORDER — CHROMIUM 200 MCG
200 TABLET ORAL DAILY
COMMUNITY

## 2018-09-25 NOTE — PATIENT INSTRUCTIONS
Continue current medications as prescribed. Continue to follow up with primary care provider for non cardiac medical problems. Call the office with any problems, questions or concerns at [phone]. Follow up as scheduled with your cardiologist- 6 months Dr. Jeannine Bonilla. Return in 6 weeks for INR. The following educational material has been included in this after visit summary for your review: coumadin safety; heart health.     Additional instructions:  Coumadin can increase your risk of bleeding. If you notice blood in urine or stool, bleeding gums, excessive bruising or cough productive of bloody sputum, notify the office. Information on this blood thinner has been included in your after visit summary. Coronary artery disease risk factors you can control: Smoking, high blood pressure, high cholesterol, diabetes, being overweight, lack of exercise and stress. Continue heart healthy diet. Take medications as directed. Exercise as tolerated. Strive for 15 minutes of exercise most days of the week. If asked to keep a blood pressure log, do so for 2 weeks. Call the office to report readings at [phone]. Blood pressure goal is 140/90 or less. If you are a diabetic, the goal is 130/80 or less. If you are taking cholesterol lowering medications, it is recommended that lab work be checked annually. Always keep a current medication list. Bring your medications to every office visit.        Patient Education        Taking Warfarin Safely: Care Instructions  Your Care Instructions    Warfarin is a medicine that you take to prevent blood clots. It is often called a blood thinner. Doctors give warfarin (such as Coumadin) to reduce the risk of blood clots. You may be at risk for blood clots if you have atrial fibrillation or deep vein thrombosis. Some other health problems may also put you at risk. Warfarin slows the amount of time it takes for your blood to clot. It can cause bleeding problems.  Even if

## 2018-09-25 NOTE — PROGRESS NOTES
levothyroxine (SYNTHROID) 112 MCG tablet Take 112 mcg by mouth daily        No current facility-administered medications for this visit. Allergies: Patient has no known allergies. Review of Systems  Constitutional  no appetite change, or unexpected weight change. No fever, chills or diaphoresis. No significant change in activity level or new onset of fatigue. HEENT  no significant rhinorrhea or epistaxis. No tinnitus or significant hearing loss. Eyes  no sudden vision change or amaurosis. No corneal arcus, xantholasma, subconjunctival hemorrhage or discharge. Respiratory  no significant wheezing, stridor, apnea or cough. No dyspnea on exertion or shortness of air. Cardiovascular  no exertional chest pain to suggest myocardial ischemia. No orthopnea or PND. No sensation of sustained arrythmia. No occurrence of slow heart rate. No palpitations. No claudication. No leg edema. Gastrointestinal  no abdominal swelling or pain. No blood in stool. No severe constipation, diarrhea, nausea, or vomiting. Genitourinary  no dysuria, frequency, or urgency. No flank pain or hematuria. Musculoskeletal  no back pain or myalgia. No problems with gait. Extremities - no clubbing, cyanosis or edema. Skin  no color change or rash. No pallor. No new surgical incision. Neurologic  no speech difficulty, facial asymmetry or lateralizing weakness. No seizures, presyncope or syncope. No significant dizziness. Hematologic  no easy bruising or excessive bleeding. Psychiatric  no severe anxiety or insomnia. No confusion. All other review of systems are negative. Objective  Vital Signs - BP (!) 140/90   Pulse 60   Ht 6' 2\" (1.88 m)   Wt 213 lb (96.6 kg)   BMI 27.35 kg/m²   General - Gearldine Sickle is alert, cooperative, and pleasant. Well groomed. No acute distress. Body habitus - Body mass index is 27.35 kg/m². HEENT  Head is normocephalic. No circumoral cyanosis.   Dentition is INR    Collection Time: 09/07/18  8:28 AM   Result Value Ref Range    INR 3.4     Protime     POCT INR    Collection Time: 09/25/18 10:29 AM   Result Value Ref Range    INR 2.9     Protime       Plan  Previous cardiac history and records reviewed. Continue current medications as prescribed. Continue to follow up with primary care provider for non cardiac medical problems. Call the office with any problems, questions or concerns at [phone]. Follow up as scheduled with your cardiologist- 6 months Dr. Chayo Constantino. Return in 6 weeks for INR. The following educational material has been included in this after visit summary for your review: coumadin safety; heart health.     Additional instructions:  Coumadin can increase your risk of bleeding. If you notice blood in urine or stool, bleeding gums, excessive bruising or cough productive of bloody sputum, notify the office. Information on this blood thinner has been included in your after visit summary. Coronary artery disease risk factors you can control: Smoking, high blood pressure, high cholesterol, diabetes, being overweight, lack of exercise and stress. Continue heart healthy diet. Take medications as directed. Exercise as tolerated. Strive for 15 minutes of exercise most days of the week. If asked to keep a blood pressure log, do so for 2 weeks. Call the office to report readings at [phone]. Blood pressure goal is 140/90 or less. If you are a diabetic, the goal is 130/80 or less. If you are taking cholesterol lowering medications, it is recommended that lab work be checked annually.   Always keep a current medication list. Bring your medications to every office visit.       BEVERLY Tompkins

## 2018-11-02 ENCOUNTER — ANTI-COAG VISIT (OUTPATIENT)
Dept: CARDIOLOGY | Age: 63
End: 2018-11-02
Payer: MEDICARE

## 2018-11-02 DIAGNOSIS — I48.0 PAF (PAROXYSMAL ATRIAL FIBRILLATION) (HCC): Primary | ICD-10-CM

## 2018-11-02 LAB
INTERNATIONAL NORMALIZATION RATIO, POC: 1.6
PROTHROMBIN TIME, POC: NORMAL

## 2018-11-02 PROCEDURE — 85610 PROTHROMBIN TIME: CPT | Performed by: CLINICAL NURSE SPECIALIST

## 2018-11-16 ENCOUNTER — ANTI-COAG VISIT (OUTPATIENT)
Dept: CARDIOLOGY | Age: 63
End: 2018-11-16
Payer: MEDICARE

## 2018-11-16 DIAGNOSIS — I48.0 PAF (PAROXYSMAL ATRIAL FIBRILLATION) (HCC): Primary | ICD-10-CM

## 2018-11-16 LAB
INTERNATIONAL NORMALIZATION RATIO, POC: 2.3
PROTHROMBIN TIME, POC: NORMAL

## 2018-11-16 PROCEDURE — 85610 PROTHROMBIN TIME: CPT | Performed by: CLINICAL NURSE SPECIALIST

## 2018-12-01 DIAGNOSIS — I42.9 CARDIOMYOPATHY (HCC): ICD-10-CM

## 2018-12-03 RX ORDER — WARFARIN SODIUM 5 MG/1
TABLET ORAL
Qty: 180 TABLET | Refills: 3 | Status: SHIPPED | OUTPATIENT
Start: 2018-12-03 | End: 2019-03-25 | Stop reason: DRUGHIGH

## 2018-12-28 ENCOUNTER — ANTI-COAG VISIT (OUTPATIENT)
Dept: CARDIOLOGY | Age: 63
End: 2018-12-28
Payer: MEDICARE

## 2018-12-28 DIAGNOSIS — I48.0 PAF (PAROXYSMAL ATRIAL FIBRILLATION) (HCC): Primary | ICD-10-CM

## 2018-12-28 LAB
INTERNATIONAL NORMALIZATION RATIO, POC: 2.7
PROTHROMBIN TIME, POC: NORMAL

## 2018-12-28 PROCEDURE — 85610 PROTHROMBIN TIME: CPT | Performed by: CLINICAL NURSE SPECIALIST

## 2019-02-08 ENCOUNTER — ANTI-COAG VISIT (OUTPATIENT)
Dept: CARDIOLOGY | Age: 64
End: 2019-02-08
Payer: MEDICARE

## 2019-02-08 DIAGNOSIS — I48.0 PAF (PAROXYSMAL ATRIAL FIBRILLATION) (HCC): Primary | ICD-10-CM

## 2019-02-08 LAB
INTERNATIONAL NORMALIZATION RATIO, POC: 2.1
PROTHROMBIN TIME, POC: NORMAL

## 2019-02-08 PROCEDURE — 85610 PROTHROMBIN TIME: CPT | Performed by: CLINICAL NURSE SPECIALIST

## 2019-03-25 ENCOUNTER — OFFICE VISIT (OUTPATIENT)
Dept: CARDIOLOGY | Age: 64
End: 2019-03-25
Payer: MEDICARE

## 2019-03-25 VITALS
SYSTOLIC BLOOD PRESSURE: 120 MMHG | BODY MASS INDEX: 27.72 KG/M2 | WEIGHT: 216 LBS | HEART RATE: 82 BPM | DIASTOLIC BLOOD PRESSURE: 78 MMHG | HEIGHT: 74 IN

## 2019-03-25 DIAGNOSIS — I10 ESSENTIAL HYPERTENSION: ICD-10-CM

## 2019-03-25 DIAGNOSIS — I48.0 PAF (PAROXYSMAL ATRIAL FIBRILLATION) (HCC): ICD-10-CM

## 2019-03-25 DIAGNOSIS — I25.10 CORONARY ARTERY DISEASE INVOLVING NATIVE CORONARY ARTERY OF NATIVE HEART WITHOUT ANGINA PECTORIS: Primary | ICD-10-CM

## 2019-03-25 DIAGNOSIS — I25.5 ISCHEMIC CARDIOMYOPATHY: ICD-10-CM

## 2019-03-25 DIAGNOSIS — E78.2 MIXED HYPERLIPIDEMIA: ICD-10-CM

## 2019-03-25 LAB
INTERNATIONAL NORMALIZATION RATIO, POC: 2.2
PROTHROMBIN TIME, POC: NORMAL

## 2019-03-25 PROCEDURE — 85610 PROTHROMBIN TIME: CPT | Performed by: NURSE PRACTITIONER

## 2019-03-25 PROCEDURE — G8419 CALC BMI OUT NRM PARAM NOF/U: HCPCS | Performed by: NURSE PRACTITIONER

## 2019-03-25 PROCEDURE — 3017F COLORECTAL CA SCREEN DOC REV: CPT | Performed by: NURSE PRACTITIONER

## 2019-03-25 PROCEDURE — 99213 OFFICE O/P EST LOW 20 MIN: CPT | Performed by: NURSE PRACTITIONER

## 2019-03-25 PROCEDURE — G8427 DOCREV CUR MEDS BY ELIG CLIN: HCPCS | Performed by: NURSE PRACTITIONER

## 2019-03-25 PROCEDURE — G8484 FLU IMMUNIZE NO ADMIN: HCPCS | Performed by: NURSE PRACTITIONER

## 2019-03-25 PROCEDURE — 1036F TOBACCO NON-USER: CPT | Performed by: NURSE PRACTITIONER

## 2019-03-25 PROCEDURE — 93000 ELECTROCARDIOGRAM COMPLETE: CPT | Performed by: NURSE PRACTITIONER

## 2019-03-25 PROCEDURE — G8598 ASA/ANTIPLAT THER USED: HCPCS | Performed by: NURSE PRACTITIONER

## 2019-03-25 RX ORDER — WARFARIN SODIUM 5 MG/1
TABLET ORAL
COMMUNITY
End: 2020-03-04

## 2019-05-03 ENCOUNTER — HOSPITAL ENCOUNTER (OUTPATIENT)
Dept: CT IMAGING | Age: 64
Discharge: HOME | End: 2019-05-03
Payer: MEDICARE

## 2019-05-03 DIAGNOSIS — R14.2 BELCHING: ICD-10-CM

## 2019-05-03 DIAGNOSIS — R10.9 ABDOMINAL PAIN, UNSPECIFIED ABDOMINAL LOCATION: ICD-10-CM

## 2019-05-03 LAB
ALBUMIN SERPL-MCNC: 4.7 G/DL (ref 3.5–5.2)
ALP BLD-CCNC: 78 U/L (ref 40–130)
ALT SERPL-CCNC: 19 U/L (ref 5–41)
AMYLASE: 46 U/L (ref 28–100)
ANION GAP SERPL CALCULATED.3IONS-SCNC: 10 MMOL/L (ref 7–19)
AST SERPL-CCNC: 19 U/L (ref 5–40)
BASOPHILS ABSOLUTE: 0.1 K/UL (ref 0–0.2)
BASOPHILS RELATIVE PERCENT: 1 % (ref 0–1)
BILIRUB SERPL-MCNC: 0.8 MG/DL (ref 0.2–1.2)
BUN BLDV-MCNC: 16 MG/DL (ref 8–23)
CALCIUM SERPL-MCNC: 10 MG/DL (ref 8.8–10.2)
CHLORIDE BLD-SCNC: 106 MMOL/L (ref 98–111)
CO2: 30 MMOL/L (ref 22–29)
CREAT SERPL-MCNC: 1 MG/DL (ref 0.5–1.2)
EOSINOPHILS ABSOLUTE: 0.7 K/UL (ref 0–0.6)
EOSINOPHILS RELATIVE PERCENT: 5.9 % (ref 0–5)
GFR NON-AFRICAN AMERICAN: >60
GFR NON-AFRICAN AMERICAN: >60
GLUCOSE BLD-MCNC: 88 MG/DL (ref 74–109)
HCT VFR BLD CALC: 52.5 % (ref 42–52)
HEMOGLOBIN: 17.3 G/DL (ref 14–18)
LIPASE: 31 U/L (ref 13–60)
LYMPHOCYTES ABSOLUTE: 2.3 K/UL (ref 1.1–4.5)
LYMPHOCYTES RELATIVE PERCENT: 19.7 % (ref 20–40)
MCH RBC QN AUTO: 31.7 PG (ref 27–31)
MCHC RBC AUTO-ENTMCNC: 33 G/DL (ref 33–37)
MCV RBC AUTO: 96.3 FL (ref 80–94)
MONOCYTES ABSOLUTE: 0.9 K/UL (ref 0–0.9)
MONOCYTES RELATIVE PERCENT: 7.7 % (ref 0–10)
NEUTROPHILS ABSOLUTE: 7.5 K/UL (ref 1.5–7.5)
NEUTROPHILS RELATIVE PERCENT: 65.4 % (ref 50–65)
PDW BLD-RTO: 13.2 % (ref 11.5–14.5)
PERFORMED ON: NORMAL
PLATELET # BLD: 254 K/UL (ref 130–400)
PMV BLD AUTO: 10.8 FL (ref 9.4–12.4)
POC CREATININE: 1.2 MG/DL (ref 0.3–1.3)
POC SAMPLE TYPE: NORMAL
POTASSIUM SERPL-SCNC: 4.5 MMOL/L (ref 3.5–5)
RBC # BLD: 5.45 M/UL (ref 4.7–6.1)
SODIUM BLD-SCNC: 146 MMOL/L (ref 136–145)
TOTAL PROTEIN: 8.1 G/DL (ref 6.6–8.7)
WBC # BLD: 11.5 K/UL (ref 4.8–10.8)

## 2019-05-03 PROCEDURE — 82565 ASSAY OF CREATININE: CPT

## 2019-05-03 PROCEDURE — 6360000004 HC RX CONTRAST MEDICATION: Performed by: NURSE PRACTITIONER

## 2019-05-03 PROCEDURE — 74178 CT ABD&PLV WO CNTR FLWD CNTR: CPT

## 2019-05-03 RX ADMIN — IOPAMIDOL 75 ML: 755 INJECTION, SOLUTION INTRAVENOUS at 13:09

## 2019-05-06 ENCOUNTER — ANTI-COAG VISIT (OUTPATIENT)
Dept: CARDIOLOGY | Age: 64
End: 2019-05-06
Payer: MEDICARE

## 2019-05-06 DIAGNOSIS — I48.0 PAF (PAROXYSMAL ATRIAL FIBRILLATION) (HCC): Primary | ICD-10-CM

## 2019-05-06 LAB
INTERNATIONAL NORMALIZATION RATIO, POC: 3
PROTHROMBIN TIME, POC: NORMAL

## 2019-05-06 PROCEDURE — 85610 PROTHROMBIN TIME: CPT | Performed by: NURSE PRACTITIONER

## 2019-05-06 PROCEDURE — 99211 OFF/OP EST MAY X REQ PHY/QHP: CPT | Performed by: NURSE PRACTITIONER

## 2019-05-08 ENCOUNTER — OFFICE VISIT (OUTPATIENT)
Dept: GASTROENTEROLOGY | Age: 64
End: 2019-05-08
Payer: MEDICARE

## 2019-05-08 ENCOUNTER — TELEPHONE (OUTPATIENT)
Dept: CARDIOLOGY | Age: 64
End: 2019-05-08

## 2019-05-08 VITALS
BODY MASS INDEX: 26.85 KG/M2 | SYSTOLIC BLOOD PRESSURE: 102 MMHG | WEIGHT: 209.2 LBS | HEIGHT: 74 IN | OXYGEN SATURATION: 94 % | HEART RATE: 63 BPM | DIASTOLIC BLOOD PRESSURE: 74 MMHG

## 2019-05-08 DIAGNOSIS — R14.2 BELCHING: ICD-10-CM

## 2019-05-08 DIAGNOSIS — K44.9 HIATAL HERNIA: Primary | ICD-10-CM

## 2019-05-08 DIAGNOSIS — Z86.010 HISTORY OF ADENOMATOUS POLYP OF COLON: ICD-10-CM

## 2019-05-08 DIAGNOSIS — R11.0 NAUSEA: ICD-10-CM

## 2019-05-08 DIAGNOSIS — R10.10 UPPER ABDOMINAL PAIN: ICD-10-CM

## 2019-05-08 DIAGNOSIS — Z12.11 SCREENING FOR COLON CANCER: ICD-10-CM

## 2019-05-08 PROCEDURE — 3017F COLORECTAL CA SCREEN DOC REV: CPT | Performed by: NURSE PRACTITIONER

## 2019-05-08 PROCEDURE — 99214 OFFICE O/P EST MOD 30 MIN: CPT | Performed by: NURSE PRACTITIONER

## 2019-05-08 PROCEDURE — 1036F TOBACCO NON-USER: CPT | Performed by: NURSE PRACTITIONER

## 2019-05-08 PROCEDURE — G8598 ASA/ANTIPLAT THER USED: HCPCS | Performed by: NURSE PRACTITIONER

## 2019-05-08 PROCEDURE — G8427 DOCREV CUR MEDS BY ELIG CLIN: HCPCS | Performed by: NURSE PRACTITIONER

## 2019-05-08 PROCEDURE — G8419 CALC BMI OUT NRM PARAM NOF/U: HCPCS | Performed by: NURSE PRACTITIONER

## 2019-05-08 RX ORDER — HYOSCYAMINE SULFATE 0.125 MG
1 TABLET ORAL PRN
Refills: 0 | COMMUNITY
Start: 2019-05-03 | End: 2019-09-25

## 2019-05-08 RX ORDER — OMEPRAZOLE 40 MG/1
40 CAPSULE, DELAYED RELEASE ORAL DAILY
Refills: 2 | COMMUNITY
Start: 2019-05-03

## 2019-05-08 RX ORDER — GLIMEPIRIDE 2 MG/1
2 TABLET ORAL
COMMUNITY
End: 2019-09-25

## 2019-05-08 ASSESSMENT — ENCOUNTER SYMPTOMS
NAUSEA: 1
BACK PAIN: 0
CONSTIPATION: 0
ABDOMINAL PAIN: 1
SHORTNESS OF BREATH: 0
BLOOD IN STOOL: 0
COUGH: 0
ABDOMINAL DISTENTION: 0
RECTAL PAIN: 0
DIARRHEA: 0
CHEST TIGHTNESS: 0
VOMITING: 0
SORE THROAT: 0
VOICE CHANGE: 0

## 2019-05-08 NOTE — PROGRESS NOTES
Procedure Laterality Date    BACK SURGERY  1974    spinal fusion-lumbar    CARDIAC CATHETERIZATION  02/21/06  JDT    Left heart cath    CARDIAC CATHETERIZATION  02/21/2001    EF is 75%. See scanned document.-Dr Rosy Sinha CARDIAC SURGERY  05/26/2006    Loop recorder placed-Dr Awais Smith per patient    OTHER SURGICAL HISTORY      Goiter in throat removed as a child       Current Outpatient Medications   Medication Sig Dispense Refill    glimepiride (AMARYL) 2 MG tablet Take 2 mg by mouth every morning (before breakfast)      Chlorphen-Pseudoephed-APAP (CORICIDIN D PO) Take by mouth as needed      warfarin (COUMADIN) 5 MG tablet Indications: Dr. Dora Lee follows Coumadin 7.5 mg every day or take as directed      Chromium (GTF CHROMIUM) 200 MCG TABS Take 200 mcg by mouth daily      vitamin B-12 (CYANOCOBALAMIN) 1000 MCG tablet Take 1,000 mcg by mouth 2 times daily      gabapentin (NEURONTIN) 100 MG capsule Take 100 mg by mouth 3 times daily as needed. [facility] cloNIDine (CATAPRES) 0.1 MG tablet Take 1 tablet by mouth daily 90 tablet 3    dapagliflozin (FARXIGA) 10 MG tablet Take 10 mg by mouth every morning      VICTOZA 18 MG/3ML SOPN SC injection Inject 1.8 mg into the skin daily       meloxicam (MOBIC) 7.5 MG tablet Take 7.5 mg by mouth daily       fluticasone (FLONASE) 50 MCG/ACT nasal spray 2 sprays by Nasal route daily       allopurinol (ZYLOPRIM) 300 MG tablet Take 0.5 tablet daily      baclofen (LIORESAL) 10 MG tablet Take 10 mg by mouth 2 times daily       levothyroxine (SYNTHROID) 112 MCG tablet Take 112 mcg by mouth daily       hyoscyamine (ANASPAZ;LEVSIN) 125 MCG tablet Take 1 tablet by mouth as needed  0    omeprazole (PRILOSEC) 40 MG delayed release capsule Take 40 mg by mouth daily  2     No current facility-administered medications for this visit. No Known Allergies     reports that he has never smoked.  He has never used smokeless tobacco. He reports that he does not drink alcohol or use drugs. Review of Systems   Constitutional: Positive for appetite change. Negative for fever and unexpected weight change. HENT: Negative for sore throat and voice change. Respiratory: Negative for cough, chest tightness and shortness of breath. Cardiovascular: Negative for chest pain, palpitations and leg swelling. Gastrointestinal: Positive for abdominal pain and nausea. Negative for abdominal distention, blood in stool, constipation, diarrhea, rectal pain and vomiting. Belching   Musculoskeletal: Negative for arthralgias, back pain and gait problem. Skin: Negative for pallor, rash and wound. Neurological: Negative for dizziness, weakness and light-headedness. Hematological: Negative for adenopathy. Does not bruise/bleed easily. All other systems reviewed and are negative. Objective:   Physical Exam   Constitutional: He is oriented to person, place, and time. He appears well-developed and well-nourished. No distress. /74 (Site: Left Upper Arm, Position: Sitting, Cuff Size: Medium Adult)   Pulse 63   Ht 6' 2\" (1.88 m)   Wt 209 lb 3.2 oz (94.9 kg)   SpO2 94%   BMI 26.86 kg/m²      Eyes: Conjunctivae are normal. No scleral icterus. Neck: No tracheal deviation present. Cardiovascular: Normal rate and regular rhythm. Exam reveals no gallop and no friction rub. No murmur heard. Pulmonary/Chest: Effort normal and breath sounds normal. No respiratory distress. He has no wheezes. He has no rhonchi. He has no rales. Abdominal: Soft. Normal appearance and bowel sounds are normal. He exhibits no distension and no mass. There is no hepatomegaly. There is no tenderness. There is no rebound and no guarding. Musculoskeletal: He exhibits no edema. Neurological: He is alert and oriented to person, place, and time. He has normal strength. Skin: Skin is warm, dry and intact. No cyanosis. No pallor.    Psychiatric: He has a normal mood and affect. His behavior is normal. Thought content normal. Cognition and memory are normal.       Assessment:      1. Hiatal hernia    2. Upper abdominal pain    3. Belching    4. Nausea    5. History of adenomatous polyp of colon    6. Screening for colon cancer          Plan:      Schedule colonoscopy and endoscopy     Clearance for discontinuing anticoagulants needed before scheduling procedure. Increased r isks may be associated with discontinuation of this medication including cva, tia, cardiac event. I have discussed this with patient. Patient voices understanding and agrees to proceed with scheduling. Instruct on bowel prep. Nothing to eat or drink after midnight the day of the exam.  Unable to drive for 24 hours after the procedure. No aspirin or nonsteroidal anti-inflammatories for 5 days before procedure. I have discussed the benefits, alternatives, and risks (including bleeding, perforation and death)  for pursuing Endoscopy (EGD/Colonscopy/EUS/ERCP) with the patient and they are willing to continue. We also discussed the need for anesthesia, IV access, proper dietary changes, medication changes if necessary, and need for bowel prep (if ordered) prior to their Endoscopic procedure. They are aware they must have someone accompany them to their scheduled procedure to drive them home - they agree to the above and are willing to continue. Plan for anesthesia: MAC  On coumadin for MI.  Uses oxygen prn at home    Continue omeprazole

## 2019-05-08 NOTE — PATIENT INSTRUCTIONS
Schedule colonoscopy and endoscopy. Do not eat or drink after midnight the day of the procedure. Allowed medications can be taken with a small sip of water. Please review your prep instructions for allowed medications. You will not be able to drive for 24 hours after the procedure due to sedation. Bring a  with you the day of the procedure. If you are on blood thinners, clearance from the prescribing physician will be obtained before your procedure is scheduled. If it is determined it is not safe to hold these medications for a short time an alternative procedure for evaluation may be recommended. No aspirin, ibuprofen, naproxen, fish oil or vitamin E for 5 days before procedure. Risks of colonoscopy and endoscopy include, but are not limited to, perforation, bleeding, and infection, Risk of perforation and bleeding are increased if there is a polyp removed or dilation completed. Anesthesia risks will be reviewed with you before the procedure by a member of the anesthesia department. Your physician may also schedule a follow up appointment with the nurse practitioner to discuss pathology, symptoms or to check if you have had any problems related to your procedure. If you prefer not to return to the office after your procedure please discuss this with your physician on the day of your colonoscopy. The physician will talk with you and/or your family after the procedure is completed. Final recommendations are based on the pathologist report if biopsies or specimens are taken. For Colonoscopy: You will be given specific directions regarding restrictions to diet and bowel prep instructions including laxatives. Please read these instructions one week prior to your scheduled procedure to ensure that you are prepared.  If you have any questions regarding these instructions please call our office Mon through Fri from 8:00 am to 4:00 pm.     Follow prep instructions provided for bowel prep. Take all of the bowel prep as directed. If you are having problems with nausea, stop your prep for 30-45 min to allow the nausea to subside before resuming your prep. It is important to drink plenty of fluids throughout the day before taking your laxatives. This will help to protect your kidneys, prevent dehydration and maximize the effect of the bowel prep. If polyps are removed during the procedure they will be sent to a pathologist for analysis. Unless you have a follow up appointment scheduled, you will be notified by mail of the pathology results within 4 weeks. If you have not received results after 4 weeks you may call the office to obtain this information. Your diet before a colonoscopy bowel preparation is very important to ensure a successful colon exam. It is recommended to consider certain changes to your diet three to four days prior to the procedure. Remember that your bowels need to be empty for the exam.    What foods are good to eat? Cut down on heavy solid foods three to four days before the procedure and start introducing lighter meals to your diet. The following food suggestions are a good part of your diet before a colonoscopy bowel preparation. Light meat that is easily digestible such as chicken (without the skin)   Potatoes without skin   Cheese   Eggs   A light meal of steamed white fish   Light clear soups    Foods and drinks to avoid  Avoid foods that contain too much fiber. Stay clear of dark colored beverages. They can stick to the walls of the digestive tract and make it difficult to differentiate from blood. Some of these foods are:  Red meat, rice, nuts and vegetables   Milk, other milk based fluids and cream   Most fruit and puddings   Whole grain pasta   Cereals, bran and seeds   Colored beverages, especially those that are red or purple in color   Red colored Jell-O   On the day before the colonoscopy, continue to drink plenty of clear fluids.  It is important to keep yourself hydrated before the exam.     Please follow all instructions as provided for cleansing the bowel. Failure to have an adequately prepped colon may cause you to have incomplete exam with further testing required.      [email or web address]

## 2019-05-08 NOTE — TELEPHONE ENCOUNTER
Date: 6/19/19    Cardiologist: Kianna Nguyen    Procedure: colonoscopy and endoscopy    Surgeon: [facility]    Last Office Visit: 3/25/19 - Genet Cristobal  Reason for office visit and medical concerns addressed at this office visit: CAD, PAF, Ischemic Cardiomyopathy, Hyperlipidemia, HTN    Testing Performed and Date of Service:  EKG 3/25/19  Does the patient have a stent? If so, what type? No stent    Current Medications: Coumadin, amaryl, prilosec, neurontin, catapres, farxiga, mobic, allopurinol, baclofen, synthroid, victoza    Is the patient currently taking an anticoagulant? If so, what is the diagnosis the patient has been given to warrant the need for the anticoagulant? Pt is on coumadin for PAF. Additional Notes: Spoke with pt. He states he is aware when he is out of rhythm and it is usually no longer than 20 minutes at a time. He is scheduled to come in for PT/INR and EKG the week before his procedures.

## 2019-05-16 ENCOUNTER — TELEPHONE (OUTPATIENT)
Dept: GASTROENTEROLOGY | Age: 64
End: 2019-05-16

## 2019-05-25 ENCOUNTER — APPOINTMENT (OUTPATIENT)
Dept: CT IMAGING | Age: 64
End: 2019-05-25
Payer: MEDICARE

## 2019-05-25 ENCOUNTER — HOSPITAL ENCOUNTER (OUTPATIENT)
Age: 64
Setting detail: OBSERVATION
Discharge: OTHER INSTITUTION | End: 2019-05-25
Attending: EMERGENCY MEDICINE | Admitting: INTERNAL MEDICINE
Payer: MEDICARE

## 2019-05-25 ENCOUNTER — APPOINTMENT (OUTPATIENT)
Dept: GENERAL RADIOLOGY | Age: 64
End: 2019-05-25
Payer: MEDICARE

## 2019-05-25 VITALS
RESPIRATION RATE: 20 BRPM | TEMPERATURE: 98.5 F | HEART RATE: 98 BPM | BODY MASS INDEX: 26.83 KG/M2 | WEIGHT: 209 LBS | DIASTOLIC BLOOD PRESSURE: 76 MMHG | SYSTOLIC BLOOD PRESSURE: 115 MMHG | OXYGEN SATURATION: 94 %

## 2019-05-25 DIAGNOSIS — R10.13 EPIGASTRIC PAIN: ICD-10-CM

## 2019-05-25 DIAGNOSIS — K44.9 HIATAL HERNIA: ICD-10-CM

## 2019-05-25 DIAGNOSIS — K31.89 GASTRIC VOLVULUS: Primary | ICD-10-CM

## 2019-05-25 DIAGNOSIS — R07.9 CHEST PAIN, UNSPECIFIED TYPE: ICD-10-CM

## 2019-05-25 DIAGNOSIS — M54.6 ACUTE THORACIC BACK PAIN, UNSPECIFIED BACK PAIN LATERALITY: ICD-10-CM

## 2019-05-25 LAB
ALBUMIN SERPL-MCNC: 4.9 G/DL (ref 3.5–5.2)
ALP BLD-CCNC: 79 U/L (ref 40–130)
ALT SERPL-CCNC: 15 U/L (ref 5–41)
ANION GAP SERPL CALCULATED.3IONS-SCNC: 16 MMOL/L (ref 7–19)
APTT: 41.4 SEC (ref 26–36.2)
AST SERPL-CCNC: 18 U/L (ref 5–40)
BASOPHILS ABSOLUTE: 0.1 K/UL (ref 0–0.2)
BASOPHILS RELATIVE PERCENT: 0.8 % (ref 0–1)
BILIRUB SERPL-MCNC: 0.8 MG/DL (ref 0.2–1.2)
BUN BLDV-MCNC: 16 MG/DL (ref 8–23)
CALCIUM SERPL-MCNC: 9.6 MG/DL (ref 8.8–10.2)
CHLORIDE BLD-SCNC: 106 MMOL/L (ref 98–111)
CO2: 25 MMOL/L (ref 22–29)
CREAT SERPL-MCNC: 1 MG/DL (ref 0.5–1.2)
D DIMER: <0.27 UG/ML FEU (ref 0–0.48)
EOSINOPHILS ABSOLUTE: 0.1 K/UL (ref 0–0.6)
EOSINOPHILS RELATIVE PERCENT: 0.8 % (ref 0–5)
GFR NON-AFRICAN AMERICAN: >60
GLUCOSE BLD-MCNC: 103 MG/DL (ref 74–109)
HCT VFR BLD CALC: 53 % (ref 42–52)
HEMOGLOBIN: 17.6 G/DL (ref 14–18)
INR BLD: 2.56 (ref 0.88–1.18)
LIPASE: 31 U/L (ref 13–60)
LYMPHOCYTES ABSOLUTE: 1.4 K/UL (ref 1.1–4.5)
LYMPHOCYTES RELATIVE PERCENT: 10.6 % (ref 20–40)
MCH RBC QN AUTO: 31.8 PG (ref 27–31)
MCHC RBC AUTO-ENTMCNC: 33.2 G/DL (ref 33–37)
MCV RBC AUTO: 95.8 FL (ref 80–94)
MONOCYTES ABSOLUTE: 0.7 K/UL (ref 0–0.9)
MONOCYTES RELATIVE PERCENT: 5.3 % (ref 0–10)
NEUTROPHILS ABSOLUTE: 10.7 K/UL (ref 1.5–7.5)
NEUTROPHILS RELATIVE PERCENT: 82.2 % (ref 50–65)
PDW BLD-RTO: 12.9 % (ref 11.5–14.5)
PLATELET # BLD: 249 K/UL (ref 130–400)
PMV BLD AUTO: 10.7 FL (ref 9.4–12.4)
POTASSIUM SERPL-SCNC: 4.1 MMOL/L (ref 3.5–5)
PRO-BNP: 31 PG/ML (ref 0–900)
PROTHROMBIN TIME: 26.7 SEC (ref 12–14.6)
RBC # BLD: 5.53 M/UL (ref 4.7–6.1)
SODIUM BLD-SCNC: 147 MMOL/L (ref 136–145)
TOTAL PROTEIN: 8.2 G/DL (ref 6.6–8.7)
TROPONIN: <0.01 NG/ML (ref 0–0.03)
TROPONIN: <0.01 NG/ML (ref 0–0.03)
WBC # BLD: 13 K/UL (ref 4.8–10.8)

## 2019-05-25 PROCEDURE — 83690 ASSAY OF LIPASE: CPT

## 2019-05-25 PROCEDURE — 6360000002 HC RX W HCPCS: Performed by: EMERGENCY MEDICINE

## 2019-05-25 PROCEDURE — 71275 CT ANGIOGRAPHY CHEST: CPT

## 2019-05-25 PROCEDURE — 84484 ASSAY OF TROPONIN QUANT: CPT

## 2019-05-25 PROCEDURE — 93005 ELECTROCARDIOGRAM TRACING: CPT

## 2019-05-25 PROCEDURE — 85025 COMPLETE CBC W/AUTO DIFF WBC: CPT

## 2019-05-25 PROCEDURE — 85610 PROTHROMBIN TIME: CPT

## 2019-05-25 PROCEDURE — 96374 THER/PROPH/DIAG INJ IV PUSH: CPT

## 2019-05-25 PROCEDURE — 85730 THROMBOPLASTIN TIME PARTIAL: CPT

## 2019-05-25 PROCEDURE — 80053 COMPREHEN METABOLIC PANEL: CPT

## 2019-05-25 PROCEDURE — G0378 HOSPITAL OBSERVATION PER HR: HCPCS

## 2019-05-25 PROCEDURE — 6360000004 HC RX CONTRAST MEDICATION: Performed by: EMERGENCY MEDICINE

## 2019-05-25 PROCEDURE — 96376 TX/PRO/DX INJ SAME DRUG ADON: CPT

## 2019-05-25 PROCEDURE — 99285 EMERGENCY DEPT VISIT HI MDM: CPT

## 2019-05-25 PROCEDURE — 36415 COLL VENOUS BLD VENIPUNCTURE: CPT

## 2019-05-25 PROCEDURE — 83880 ASSAY OF NATRIURETIC PEPTIDE: CPT

## 2019-05-25 PROCEDURE — 96375 TX/PRO/DX INJ NEW DRUG ADDON: CPT

## 2019-05-25 PROCEDURE — 71045 X-RAY EXAM CHEST 1 VIEW: CPT

## 2019-05-25 PROCEDURE — 6370000000 HC RX 637 (ALT 250 FOR IP): Performed by: EMERGENCY MEDICINE

## 2019-05-25 PROCEDURE — 74177 CT ABD & PELVIS W/CONTRAST: CPT

## 2019-05-25 PROCEDURE — 99285 EMERGENCY DEPT VISIT HI MDM: CPT | Performed by: EMERGENCY MEDICINE

## 2019-05-25 PROCEDURE — 85379 FIBRIN DEGRADATION QUANT: CPT

## 2019-05-25 RX ORDER — ONDANSETRON 2 MG/ML
4 INJECTION INTRAMUSCULAR; INTRAVENOUS ONCE
Status: COMPLETED | OUTPATIENT
Start: 2019-05-25 | End: 2019-05-25

## 2019-05-25 RX ORDER — MORPHINE SULFATE 4 MG/ML
4 INJECTION, SOLUTION INTRAMUSCULAR; INTRAVENOUS ONCE
Status: COMPLETED | OUTPATIENT
Start: 2019-05-25 | End: 2019-05-25

## 2019-05-25 RX ORDER — SODIUM CHLORIDE 0.9 % (FLUSH) 0.9 %
10 SYRINGE (ML) INJECTION EVERY 12 HOURS SCHEDULED
Status: CANCELLED | OUTPATIENT
Start: 2019-05-25

## 2019-05-25 RX ORDER — PROMETHAZINE HYDROCHLORIDE 25 MG/ML
25 INJECTION, SOLUTION INTRAMUSCULAR; INTRAVENOUS ONCE
Status: COMPLETED | OUTPATIENT
Start: 2019-05-25 | End: 2019-05-25

## 2019-05-25 RX ORDER — PANTOPRAZOLE SODIUM 40 MG/1
40 TABLET, DELAYED RELEASE ORAL
Status: CANCELLED | OUTPATIENT
Start: 2019-05-25

## 2019-05-25 RX ORDER — SODIUM CHLORIDE 0.9 % (FLUSH) 0.9 %
10 SYRINGE (ML) INJECTION PRN
Status: CANCELLED | OUTPATIENT
Start: 2019-05-25

## 2019-05-25 RX ORDER — ACETAMINOPHEN 325 MG/1
650 TABLET ORAL EVERY 4 HOURS PRN
Status: CANCELLED | OUTPATIENT
Start: 2019-05-25

## 2019-05-25 RX ORDER — BACLOFEN 10 MG/1
10 TABLET ORAL 2 TIMES DAILY
Status: CANCELLED | OUTPATIENT
Start: 2019-05-25

## 2019-05-25 RX ORDER — ASPIRIN 325 MG
325 TABLET ORAL ONCE
Status: COMPLETED | OUTPATIENT
Start: 2019-05-25 | End: 2019-05-25

## 2019-05-25 RX ORDER — CLONIDINE HYDROCHLORIDE 0.1 MG/1
0.1 TABLET ORAL DAILY
Status: CANCELLED | OUTPATIENT
Start: 2019-05-25

## 2019-05-25 RX ORDER — GLIMEPIRIDE 2 MG/1
2 TABLET ORAL
Status: CANCELLED | OUTPATIENT
Start: 2019-05-25

## 2019-05-25 RX ORDER — MELOXICAM 7.5 MG/1
7.5 TABLET ORAL DAILY
Status: CANCELLED | OUTPATIENT
Start: 2019-05-25

## 2019-05-25 RX ORDER — NITROGLYCERIN 0.4 MG/1
0.4 TABLET SUBLINGUAL EVERY 5 MIN PRN
Status: DISCONTINUED | OUTPATIENT
Start: 2019-05-25 | End: 2019-05-25 | Stop reason: HOSPADM

## 2019-05-25 RX ORDER — ALLOPURINOL 100 MG/1
50 TABLET ORAL DAILY
Status: CANCELLED | OUTPATIENT
Start: 2019-05-25

## 2019-05-25 RX ORDER — HYOSCYAMINE SULFATE 0.125 MG
125 TABLET ORAL 3 TIMES DAILY PRN
Status: CANCELLED | OUTPATIENT
Start: 2019-05-25

## 2019-05-25 RX ORDER — GABAPENTIN 100 MG/1
100 CAPSULE ORAL 3 TIMES DAILY
Status: CANCELLED | OUTPATIENT
Start: 2019-05-25

## 2019-05-25 RX ORDER — LEVOTHYROXINE SODIUM 112 UG/1
112 TABLET ORAL DAILY
Status: CANCELLED | OUTPATIENT
Start: 2019-05-25

## 2019-05-25 RX ORDER — ONDANSETRON 2 MG/ML
4 INJECTION INTRAMUSCULAR; INTRAVENOUS EVERY 6 HOURS PRN
Status: CANCELLED | OUTPATIENT
Start: 2019-05-25

## 2019-05-25 RX ADMIN — MORPHINE SULFATE 4 MG: 4 INJECTION INTRAVENOUS at 04:10

## 2019-05-25 RX ADMIN — NITROGLYCERIN 0.4 MG: 0.4 TABLET, ORALLY DISINTEGRATING SUBLINGUAL at 08:08

## 2019-05-25 RX ADMIN — ONDANSETRON 4 MG: 2 INJECTION INTRAMUSCULAR; INTRAVENOUS at 06:57

## 2019-05-25 RX ADMIN — ONDANSETRON 4 MG: 2 INJECTION INTRAMUSCULAR; INTRAVENOUS at 08:48

## 2019-05-25 RX ADMIN — IOPAMIDOL 90 ML: 755 INJECTION, SOLUTION INTRAVENOUS at 04:57

## 2019-05-25 RX ADMIN — PROMETHAZINE HYDROCHLORIDE 25 MG: 25 INJECTION INTRAMUSCULAR; INTRAVENOUS at 04:18

## 2019-05-25 RX ADMIN — HYDROMORPHONE HYDROCHLORIDE 1 MG: 1 INJECTION, SOLUTION INTRAMUSCULAR; INTRAVENOUS; SUBCUTANEOUS at 08:48

## 2019-05-25 RX ADMIN — ASPIRIN 325 MG: 325 TABLET, COATED ORAL at 04:10

## 2019-05-25 RX ADMIN — NITROGLYCERIN 0.4 MG: 0.4 TABLET, ORALLY DISINTEGRATING SUBLINGUAL at 06:57

## 2019-05-25 RX ADMIN — ONDANSETRON 4 MG: 2 INJECTION INTRAMUSCULAR; INTRAVENOUS at 04:10

## 2019-05-25 ASSESSMENT — ENCOUNTER SYMPTOMS
SORE THROAT: 0
VOMITING: 1
COUGH: 0
DIARRHEA: 0
SHORTNESS OF BREATH: 0
RHINORRHEA: 0
NAUSEA: 1
ABDOMINAL PAIN: 1
BACK PAIN: 0
VOMITING: 0

## 2019-05-25 ASSESSMENT — PAIN SCALES - GENERAL
PAINLEVEL_OUTOF10: 9
PAINLEVEL_OUTOF10: 7

## 2019-05-25 NOTE — ED NOTES
Report to Aristides at [facility], [place], operated by [facility].      Will John RN  05/25/19 9722

## 2019-05-25 NOTE — H&P
Christopher Nolen M.D.  Tana Leigh M.D. [facility] [place], [facility]        Internal Medicine History and Physical      Name: Benton Shah  MRN: [id number]     Acct: [de-identified]  Room: 04/04    Admit Date: 5/25/2019  PCP: Gabrielle Farrell MD    Chief Complaint:     Chief Complaint   Patient presents with    Abdominal Pain     radiating up into chest. Pt reports recent dx of hiatal hernia       History Obtained From:     patient    History of Present Illness:     Benton Shah is a  61 y.o.  male who presents with Abdominal Pain (radiating up into chest. Pt reports recent dx of hiatal hernia)  He states this has flared up the last 24 hrs. Fluctuation of and on. Associated with nausea. He states he can tell if it is his hiatal hernia or MI as it feels similar to both in different ways. Past MedicalHistory:     Past Medical History:   Diagnosis Date    CAD (coronary artery disease)     Cardiomyopathy ([facility] 6/23/2011    Diabetes mellitus ([facility]     Type 2    Gout 2/8/2012    Hypercholesteremia     Hyperlipidemia     Hypertension     MI (myocardial infarction) ([facility]     Other chest pain     history of conversion reaction.  Other primary cardiomyopathies     Syncope 2/8/2012    history of near syncopal episodes.  Unspecified cerebral artery occlusion with cerebral infarction     history of stroke. Past Surgical History:     Past Surgical History:   Procedure Laterality Date    BACK SURGERY  1974    spinal fusion-lumbar    CARDIAC CATHETERIZATION  02/21/06  JDT    Left heart cath    CARDIAC CATHETERIZATION  02/21/2001    EF is 75%.   See scanned document.-Dr Emerald Romero CARDIAC SURGERY  05/26/2006    Loop recorder placed-Dr Leonie Fay per patient    OTHER SURGICAL HISTORY      Goiter in throat removed as a child        Medications Prior toAdmission:       Prior to Admission medications    Medication Sig Start Date End Date tobacco.  Alcohol:      reports that he does not drink alcohol. Drug Use:  reports that he does not use drugs. Family History:     Family History   Problem Relation Age of Onset    Heart Disease Mother     High Blood Pressure Mother     Heart Disease Father     High Blood Pressure Father     Prostate Cancer Father     Heart Disease Brother     Colon Polyps Maternal Grandmother     Ulcerative Colitis Maternal Grandmother     Colon Cancer Neg Hx     Esophageal Cancer Neg Hx     Lung Cancer Neg Hx     Liver Disease Neg Hx     Rectal Cancer Neg Hx     Stomach Cancer Neg Hx        Review of Systems:     Review of Systems   Constitutional: Negative for chills and fever. HENT: Negative for congestion. Respiratory: Negative for cough and shortness of breath. Cardiovascular: Positive for chest pain. Negative for leg swelling. Gastrointestinal: Positive for nausea. Negative for diarrhea and vomiting. Genitourinary: Negative for dysuria and urgency. Musculoskeletal: Negative for myalgias. Skin: Negative for rash. Neurological: Negative for dizziness and headaches. Psychiatric/Behavioral: The patient is not nervous/anxious. Code Status:  No Order    Physical Exam:     Vitals:  BP (!) 135/92   Pulse 105   Temp 98.5 °F (36.9 °C) (Temporal)   Resp 20   Wt 209 lb (94.8 kg)   SpO2 92%   BMI 26.83 kg/m²   Temp (24hrs), Av.6 °F (37 °C), Min:98.5 °F (36.9 °C), Max:98.7 °F (37.1 °C)     Physical Exam   Constitutional: He is oriented to person, place, and time. He appears well-developed and well-nourished. No distress. HENT:   Head: Normocephalic and atraumatic. Mouth/Throat: Oropharynx is clear and moist.   Eyes: Pupils are equal, round, and reactive to light. EOM are normal.   Neck: Normal range of motion. Neck supple. No JVD present. No tracheal deviation present. No thyromegaly present. Cardiovascular: Normal rate, regular rhythm and intact distal pulses.    Pulmonary/Chest: Effort normal and breath sounds normal. No respiratory distress. He exhibits no tenderness. Abdominal: Soft. Bowel sounds are normal. He exhibits no distension. There is no tenderness. Musculoskeletal: Normal range of motion. He exhibits no edema. Neurological: He is alert and oriented to person, place, and time. He displays normal reflexes. Skin: Skin is warm and dry. No rash noted. Psychiatric: He has a normal mood and affect. His behavior is normal.   Nursing note and vitals reviewed.             Data:     Results for orders placed or performed during the hospital encounter of 05/25/19   APTT   Result Value Ref Range    aPTT 41.4 (H) 26.0 - 36.2 sec   Brain Natriuretic Peptide   Result Value Ref Range    Pro-BNP 31 0 - 900 pg/mL   CBC Auto Differential   Result Value Ref Range    WBC 13.0 (H) 4.8 - 10.8 K/uL    RBC 5.53 4.70 - 6.10 M/uL    Hemoglobin 17.6 14.0 - 18.0 g/dL    Hematocrit 53.0 (H) 42.0 - 52.0 %    MCV 95.8 (H) 80.0 - 94.0 fL    MCH 31.8 (H) 27.0 - 31.0 pg    MCHC 33.2 33.0 - 37.0 g/dL    RDW 12.9 11.5 - 14.5 %    Platelets 068 296 - 399 K/uL    MPV 10.7 9.4 - 12.4 fL    Neutrophils % 82.2 (H) 50.0 - 65.0 %    Lymphocytes % 10.6 (L) 20.0 - 40.0 %    Monocytes % 5.3 0.0 - 10.0 %    Eosinophils % 0.8 0.0 - 5.0 %    Basophils % 0.8 0.0 - 1.0 %    Neutrophils # 10.7 (H) 1.5 - 7.5 K/uL    Lymphocytes # 1.4 1.1 - 4.5 K/uL    Monocytes # 0.70 0.00 - 0.90 K/uL    Eosinophils # 0.10 0.00 - 0.60 K/uL    Basophils # 0.10 0.00 - 0.20 K/uL   Comprehensive Metabolic Panel   Result Value Ref Range    Sodium 147 (H) 136 - 145 mmol/L    Potassium 4.1 3.5 - 5.0 mmol/L    Chloride 106 98 - 111 mmol/L    CO2 25 22 - 29 mmol/L    Anion Gap 16 7 - 19 mmol/L    Glucose 103 74 - 109 mg/dL    BUN 16 8 - 23 mg/dL    CREATININE 1.0 0.5 - 1.2 mg/dL    GFR Non-African American >60 >60    Calcium 9.6 8.8 - 10.2 mg/dL    Total Protein 8.2 6.6 - 8.7 g/dL    Alb 4.9 3.5 - 5.2 g/dL    Total Bilirubin 0.8 0.2 - 1.2 mg/dL Alkaline Phosphatase 79 40 - 130 U/L    ALT 15 5 - 41 U/L    AST 18 5 - 40 U/L   D-Dimer, Quantitative   Result Value Ref Range    D-Dimer, Quant <0.27 0.00 - 0.48 ug/mL FEU   Protime-INR   Result Value Ref Range    Protime 26.7 (H) 12.0 - 14.6 sec    INR 2.56 (H) 0.88 - 1.18   Troponin   Result Value Ref Range    Troponin <0.01 0.00 - 0.03 ng/mL   Troponin   Result Value Ref Range    Troponin <0.01 0.00 - 0.03 ng/mL   Lipase   Result Value Ref Range    Lipase 31 13 - 60 U/L           Assesment:     Primary Problem  <principal problem not specified>    Active Hospital Problems    Diagnosis Date Noted    Chest pain [R07.9] 05/25/2019       Plan:     1. Acute chest pain  2. CAD with history of MI  3. HTN  4. HLD  5. DM2 with hyperglycemia  6. Hiatal hernia  7. Nausea    Admit/ rule out for mi/ consult cardiology/ antiemetics  THis may just be his hiatal hernia but with extensive cardiac history he needs to be ruled out.       Electronically signed by Jay Price MD on 5/25/2019 at 7:44 AM     Copy sent to Dr. Jay Price MD

## 2019-05-25 NOTE — ED PROVIDER NOTES
(myocardial infarction) ([facility] [address]     Other chest pain     history of conversion reaction.  Other primary cardiomyopathies     Syncope 2/8/2012    history of near syncopal episodes.  Unspecified cerebral artery occlusion with cerebral infarction     history of stroke. SURGICAL HISTORY       Past Surgical History:   Procedure Laterality Date    BACK SURGERY  1974    spinal fusion-lumbar    CARDIAC CATHETERIZATION  02/21/06  [facility]    Left heart cath    CARDIAC CATHETERIZATION  02/21/2001    EF is 75%. See scanned document.-Dr Fidelia Ness CARDIAC SURGERY  05/26/2006    Loop recorder placed-Dr Jacqueline Light per patient    OTHER SURGICAL HISTORY      Goiter in throat removed as a child         CURRENT MEDICATIONS       Previous Medications    ALLOPURINOL (ZYLOPRIM) 300 MG TABLET    Take 0.5 tablet daily    BACLOFEN (LIORESAL) 10 MG TABLET    Take 10 mg by mouth 2 times daily     CHLORPHEN-PSEUDOEPHED-APAP (CORICIDIN D PO)    Take by mouth as needed    CHROMIUM (GTF CHROMIUM) 200 MCG TABS    Take 200 mcg by mouth daily    CLONIDINE (CATAPRES) 0.1 MG TABLET    Take 1 tablet by mouth daily    DAPAGLIFLOZIN (FARXIGA) 10 MG TABLET    Take 10 mg by mouth every morning    FLUTICASONE (FLONASE) 50 MCG/ACT NASAL SPRAY    2 sprays by Nasal route daily     GABAPENTIN (NEURONTIN) 100 MG CAPSULE    Take 100 mg by mouth 3 times daily as needed. Beckey Conquest     GLIMEPIRIDE (AMARYL) 2 MG TABLET    Take 2 mg by mouth every morning (before breakfast)    HYOSCYAMINE (ANASPAZ;LEVSIN) 125 MCG TABLET    Take 1 tablet by mouth as needed    LEVOTHYROXINE (SYNTHROID) 112 MCG TABLET    Take 112 mcg by mouth daily     MELOXICAM (MOBIC) 7.5 MG TABLET    Take 7.5 mg by mouth daily     OMEPRAZOLE (PRILOSEC) 40 MG DELAYED RELEASE CAPSULE    Take 40 mg by mouth daily    VICTOZA 18 MG/3ML SOPN SC INJECTION    Inject 1.8 mg into the skin daily     VITAMIN B-12 (CYANOCOBALAMIN) 1000 MCG TABLET    Take 1,000 mcg by mouth 2 times daily    WARFARIN (COUMADIN) 5 MG TABLET    Indications: Dr. Jonas Ramirez follows Coumadin 7.5 mg every day or take as directed       ALLERGIES     Patient has no known allergies.     FAMILY HISTORY       Family History   Problem Relation Age of Onset    Heart Disease Mother     High Blood Pressure Mother     Heart Disease Father     High Blood Pressure Father     Prostate Cancer Father     Heart Disease Brother     Colon Polyps Maternal Grandmother     Ulcerative Colitis Maternal Grandmother     Colon Cancer Neg Hx     Esophageal Cancer Neg Hx     Lung Cancer Neg Hx     Liver Disease Neg Hx     Rectal Cancer Neg Hx     Stomach Cancer Neg Hx           SOCIAL HISTORY       Social History     Socioeconomic History    Marital status:      Spouse name: None    Number of children: None    Years of education: None    Highest education level: None   Occupational History    None   Social Needs    Financial resource strain: None    Food insecurity:     Worry: None     Inability: None    Transportation needs:     Medical: None     Non-medical: None   Tobacco Use    Smoking status: Never Smoker    Smokeless tobacco: Never Used   Substance and Sexual Activity    Alcohol use: No    Drug use: No    Sexual activity: None   Lifestyle    Physical activity:     Days per week: None     Minutes per session: None    Stress: None   Relationships    Social connections:     Talks on phone: None     Gets together: None     Attends Restorationism service: None     Active member of club or organization: None     Attends meetings of clubs or organizations: None     Relationship status: None    Intimate partner violence:     Fear of current or ex partner: None     Emotionally abused: None     Physically abused: None     Forced sexual activity: None   Other Topics Concern    None   Social History Narrative    None       SCREENINGS    Kyra Coma Scale  Eye Opening: Spontaneous  Best Verbal Response: Oriented  Best Motor Response: Obeys commands  Kyra Coma Scale Score: 15        PHYSICAL EXAM    (up to 7 for level 4, 8 or more for level 5)     ED Triage Vitals [05/25/19 0358]   BP Temp Temp Source Pulse Resp SpO2 Height Weight   (!) 190/172 98.7 °F (37.1 °C) Oral 102 18 94 % -- 209 lb (94.8 kg)       Physical Exam   Constitutional: He is oriented to person, place, and time. He appears well-developed and well-nourished. No distress. Appears in pain   HENT:   Head: Normocephalic and atraumatic. Eyes: Pupils are equal, round, and reactive to light. Neck: Normal range of motion. Neck supple. Cardiovascular: Normal rate, regular rhythm, normal heart sounds and intact distal pulses. Pulmonary/Chest: Effort normal and breath sounds normal. No respiratory distress. Abdominal: Soft. Bowel sounds are normal. He exhibits no distension. There is tenderness. epigastric   Musculoskeletal: Normal range of motion. He exhibits no edema. Neurological: He is alert and oriented to person, place, and time. No cranial nerve deficit. He exhibits normal muscle tone. Coordination normal.   Skin: Skin is warm and dry. No rash noted. He is not diaphoretic. Psychiatric: He has a normal mood and affect. His behavior is normal.   Nursing note and vitals reviewed. DIAGNOSTIC RESULTS     EKG: All EKG's are interpreted by the Emergency Department Physician who either signs or Co-signs this chart in the absence of a cardiologist.    Sinus rhythm rate 97 nonspecific ST-T waves no acute abnormalities inferior Q waves.  Unchanged from prior  Repeat EKG sinus tachycardia rate 103 inferior Q waves and nonspecific ST waves    RADIOLOGY:   Non-plain film images such as CT, Ultrasound and MRI are read by the radiologist. Kenya Broussardman images are visualized and preliminarily interpreted by the emergency physician with the below findings:    CXR: hiatal hernia    Interpretation per the Radiologist below, if available at the time of this note:    XR CHEST PORTABLE    (Results Pending)   CTA PULMONARY W CONTRAST    (Results Pending)   CT ABDOMEN PELVIS W IV CONTRAST Additional Contrast? None    (Results Pending)       CTACHEST:  No evidence of pulmonaryembolism. Verylarge hiatal hernia containing a large amount of fluid and debris/food material. There is also mild  fluid or fat stranding within the adjacent fat of the posterior mediastinumthat is nonspecific. Gallbladder is mildlydistended, nonspecific finding. Bilateral presumed atelectasis. The lungs are otherwise clear    CT ABDOMEN & PELVIS With Contrast:  Compared with a CT dated 5/3/2019. Reidentified large hiatal hernia containing fluid and debriswith mild fluid and fat stranding in the  adjacent posterior mediastinal fat of uncertain significance. Gallbladder is mildlydistended with questionable cholelithiasis. This can be further characterized with  ultrasound or HIDAscan if clinicallyindicated. No free air, free fluid, or bowel obstruction. The appendix is difficult to visualize throughout its course. It again appears to be dilated and fluidfilled, measuring up to 9 mmin diameter. There is no adjacent fat stranding. It appears similar to the  prior exam. This is indeterminate.  Please correlate with anysigns/symptoms of appendicitis  ED BEDSIDE ULTRASOUND:   Performed by ED Physician - none    LABS:  Labs Reviewed   APTT - Abnormal; Notable for the following components:       Result Value    aPTT 41.4 (*)     All other components within normal limits   CBC WITH AUTO DIFFERENTIAL - Abnormal; Notable for the following components:    WBC 13.0 (*)     Hematocrit 53.0 (*)     MCV 95.8 (*)     MCH 31.8 (*)     Neutrophils % 82.2 (*)     Lymphocytes % 10.6 (*)     Neutrophils # 10.7 (*)     All other components within normal limits   COMPREHENSIVE METABOLIC PANEL - Abnormal; Notable for the following components:    Sodium 147 (*)     All other components within normal limits PROTIME-INR - Abnormal; Notable for the following components:    Protime 26.7 (*)     INR 2.56 (*)     All other components within normal limits   BRAIN NATRIURETIC PEPTIDE   D-DIMER, QUANTITATIVE   TROPONIN   TROPONIN   LIPASE       All other labs were within normal range or not returned as of this dictation. EMERGENCY DEPARTMENT COURSE and DIFFERENTIALDIAGNOSIS/MDM:   Vitals:    Vitals:    05/25/19 0359 05/25/19 0433 05/25/19 0520 05/25/19 0603   BP:  (!) 151/103 (!) 150/94 (!) 145/91   Pulse: 98 109 101 102   Resp:  18 18 17   Temp:       TempSrc:       SpO2:  92% 90% 90%   Weight:           MDM  Pt still nauseated. CP improved but still present. Does not feel well enough to go home. CT showing hiatal hernia, unchanged from previous imagine. Will admit for further observation. D/w Dr Johana Kussmaul for admission    CONSULTS:  None    PROCEDURES:  Unless otherwise notedbelow, none     Procedures    FINAL IMPRESSION     1. Chest pain, unspecified type    2. Acute thoracic back pain, unspecified back pain laterality    3. Epigastric pain    4.  Hiatal hernia          DISPOSITION/PLAN   DISPOSITION        PATIENT REFERRED TO:  @FUP@    DISCHARGE MEDICATIONS:  New Prescriptions    No medications on file          (Please note that portions of this note were completed with a voice recognition program.  Efforts were made to edit the dictations butoccasionally words are mis-transcribed.)    Rosales Tejeda MD (electronically signed)  AttendingEmergency Physician         Rosales Tejeda MD  05/25/19 5535

## 2019-05-25 NOTE — ED NOTES
Patient placed in a gown. Patient placed on cardiac monitor, continuous pulse oximeter, and NIBP monitor. Monitor alarms on.        Carmelo Plasencia RN  05/25/19 2635

## 2019-05-25 NOTE — ED PROVIDER NOTES
[facility] EMERGENCY DEPT  eMERGENCYdEPARTMENT eNCOUnter      Pt Name: Skylar Fall  MRN: [id number]  [place] 1955  Date of evaluation: 5/25/2019  Daxa Leyva MD    Emergency Department care of this patient was assumed at 0700 from Dr. Brandon Aguiar. We have discussed the case and the plan of care. I have seen and evaluated patient and reviewed ED course. Patient currently waiting inpatient admission for abdominal pain. He is already been seen by Dr. Johana Kussmaul here in the ED. CHIEF COMPLAINT       Chief Complaint   Patient presents with    Abdominal Pain     radiating up into chest. Pt reports recent dx of hiatal hernia         PHYSICAL EXAM    (up to 7 for level 4, 8 or more for level 5)     ED Triage Vitals [05/25/19 0358]   BP Temp Temp Source Pulse Resp SpO2 Height Weight   (!) 190/172 98.7 °F (37.1 °C) Oral 102 18 94 % -- 209 lb (94.8 kg)       Physical Exam     My exam: Patient is alert and speaking. He is mildly tachycardic with a heart rate in the 110s. His lungs are clear bilaterally. His abdomen is soft however mild to moderate tenderness located in the midepigastric area. There is no distention identified. DIAGNOSTIC RESULTS       RADIOLOGY:   Non-plain film imagessuch as CT, Ultrasound and MRI are read by the radiologist. Plain radiographic images are visualized and preliminarily interpreted by the emergency physician with the below findings:        CT ABDOMEN PELVIS W IV CONTRAST Additional Contrast? None   Final Result   1. Hiatal hernia with dilated stomach with a decompressed appearance   of the distal stomach and duodenum. There are air-fluid levels in the   dilated components of the stomach both above and below the diaphragm. There is also some fluid around the portion of the stomach in the   hiatal hernia. Given these findings, I have concern about gastric   volvulus. These findings were discussed with Dr. Evon Hamilton in the   emergency room at 8:10 AM on 5/25/2019.   2. Fatty liver. 3. Bilateral renal cysts. 4. Other nonacute findings, as discussed above. Signed by Dr Yogesh Canales on 5/25/2019 8:14 AM      CTA PULMONARY W CONTRAST   Final Result   1. No CT evidence of pulmonary embolus. Dilated pulmonary arteries. 2. Dependent atelectasis. Paraseptal emphysema. 3. Distended hiatal hernia discussed further on abdomen and pelvis CT   report. There is fluid in the mediastinum around the hiatal hernia. Signed by Dr Yogesh Canales on 5/25/2019 8:19 AM      XR CHEST PORTABLE    (Results Pending)           LABS:  Labs Reviewed   APTT - Abnormal; Notable for the following components:       Result Value    aPTT 41.4 (*)     All other components within normal limits   CBC WITH AUTO DIFFERENTIAL - Abnormal; Notable for the following components:    WBC 13.0 (*)     Hematocrit 53.0 (*)     MCV 95.8 (*)     MCH 31.8 (*)     Neutrophils % 82.2 (*)     Lymphocytes % 10.6 (*)     Neutrophils # 10.7 (*)     All other components within normal limits   COMPREHENSIVE METABOLIC PANEL - Abnormal; Notable for the following components:    Sodium 147 (*)     All other components within normal limits   PROTIME-INR - Abnormal; Notable for the following components:    Protime 26.7 (*)     INR 2.56 (*)     All other components within normal limits   BRAIN NATRIURETIC PEPTIDE   D-DIMER, QUANTITATIVE   TROPONIN   TROPONIN   LIPASE       All other labs were within normal range or not returned as of this dictation. EMERGENCY DEPARTMENT COURSE and DIFFERENTIAL DIAGNOSIS/MDM:   Vitals:    Vitals:    05/25/19 0632 05/25/19 0718 05/25/19 0720 05/25/19 0731   BP: (!) 151/93 (!) 147/98 (!) 141/79 (!) 135/92   Pulse: 99 97 105 105   Resp: 19 22 20    Temp:   98.5 °F (36.9 °C)    TempSrc:   Temporal    SpO2: 92% (!) 87% 93% 92%   Weight:           MDM      CONSULTS:    I reviewed patient's case with Dr. Sharon Salazar who has already seen the patient here in the emergency department.  We reviewed patient's CT scan findings concerning for possible gastric volvulus. There is no GI currently on call here at [facility]. Dr. Nicki Ortiz has discussed case with Dr. Koko Davidson (Gen Surg) who advised transfer to a facility with gastroenterology. After further discussion with Dr. Nicki Ortiz decision made to transfer patient to [facility] for GI consultation and management of his gastric volvulus with hiatal hernia. I discussed this plan of care with patient and family. They are agreeable. Case was discussed with [facility] - accepting physician is  Dr. Cedric Ryan. PROCEDURES:  Unless otherwise noted below, none     Procedures    FINAL IMPRESSION      1. Gastric volvulus    2. Chest pain, unspecified type    3. Acute thoracic back pain, unspecified back pain laterality    4. Epigastric pain    5.  Hiatal hernia          DISPOSITION/PLAN   DISPOSITION Decision To Transfer    (Please note that portions ofthis note were completed with a voice recognition program.  Efforts were made to edit the dictations but occasionally words are mis-transcribed.)    Layla Jiménez MD(electronically signed)  Attending Emergency Physician         Layla Jiménez MD  05/25/19 0805

## 2019-05-27 LAB
EKG P AXIS: 40 DEGREES
EKG P AXIS: 51 DEGREES
EKG P-R INTERVAL: 172 MS
EKG P-R INTERVAL: 178 MS
EKG Q-T INTERVAL: 350 MS
EKG Q-T INTERVAL: 362 MS
EKG QRS DURATION: 90 MS
EKG QRS DURATION: 92 MS
EKG QTC CALCULATION (BAZETT): 423 MS
EKG QTC CALCULATION (BAZETT): 426 MS
EKG T AXIS: 29 DEGREES
EKG T AXIS: 44 DEGREES

## 2019-06-06 ENCOUNTER — ANTI-COAG VISIT (OUTPATIENT)
Dept: CARDIOLOGY | Age: 64
End: 2019-06-06
Payer: MEDICARE

## 2019-06-06 DIAGNOSIS — I48.0 PAF (PAROXYSMAL ATRIAL FIBRILLATION) (HCC): Primary | ICD-10-CM

## 2019-06-06 LAB
INTERNATIONAL NORMALIZATION RATIO, POC: 1.9
PROTHROMBIN TIME, POC: NORMAL

## 2019-06-06 PROCEDURE — 85610 PROTHROMBIN TIME: CPT | Performed by: CLINICAL NURSE SPECIALIST

## 2019-06-17 NOTE — TELEPHONE ENCOUNTER
Pt ended up very sick and having procedure done in [place]. He no longer needs clearance per [facility] gastro.

## 2019-07-11 ENCOUNTER — ANTI-COAG VISIT (OUTPATIENT)
Dept: CARDIOLOGY | Age: 64
End: 2019-07-11
Payer: MEDICARE

## 2019-07-11 DIAGNOSIS — I48.0 PAF (PAROXYSMAL ATRIAL FIBRILLATION) (HCC): Primary | ICD-10-CM

## 2019-07-11 LAB
INTERNATIONAL NORMALIZATION RATIO, POC: 2.3
PROTHROMBIN TIME, POC: NORMAL

## 2019-07-11 PROCEDURE — 85610 PROTHROMBIN TIME: CPT | Performed by: CLINICAL NURSE SPECIALIST

## 2019-08-22 ENCOUNTER — ANTI-COAG VISIT (OUTPATIENT)
Dept: CARDIOLOGY | Age: 64
End: 2019-08-22
Payer: MEDICARE

## 2019-08-22 DIAGNOSIS — I48.0 PAF (PAROXYSMAL ATRIAL FIBRILLATION) (HCC): Primary | ICD-10-CM

## 2019-08-22 LAB
INTERNATIONAL NORMALIZATION RATIO, POC: 1.8
PROTHROMBIN TIME, POC: NORMAL

## 2019-08-22 PROCEDURE — 85610 PROTHROMBIN TIME: CPT | Performed by: CLINICAL NURSE SPECIALIST

## 2019-09-25 ENCOUNTER — OFFICE VISIT (OUTPATIENT)
Dept: CARDIOLOGY | Age: 64
End: 2019-09-25
Payer: MEDICARE

## 2019-09-25 VITALS
HEIGHT: 74 IN | SYSTOLIC BLOOD PRESSURE: 152 MMHG | HEART RATE: 62 BPM | WEIGHT: 205 LBS | BODY MASS INDEX: 26.31 KG/M2 | DIASTOLIC BLOOD PRESSURE: 94 MMHG

## 2019-09-25 DIAGNOSIS — I10 ESSENTIAL HYPERTENSION: ICD-10-CM

## 2019-09-25 DIAGNOSIS — I48.0 PAF (PAROXYSMAL ATRIAL FIBRILLATION) (HCC): ICD-10-CM

## 2019-09-25 DIAGNOSIS — I25.10 CORONARY ARTERY DISEASE INVOLVING NATIVE CORONARY ARTERY OF NATIVE HEART WITHOUT ANGINA PECTORIS: Primary | ICD-10-CM

## 2019-09-25 DIAGNOSIS — E78.2 MIXED HYPERLIPIDEMIA: ICD-10-CM

## 2019-09-25 DIAGNOSIS — I25.5 ISCHEMIC CARDIOMYOPATHY: ICD-10-CM

## 2019-09-25 DIAGNOSIS — Z79.01 CHRONIC ANTICOAGULATION: ICD-10-CM

## 2019-09-25 LAB
INTERNATIONAL NORMALIZATION RATIO, POC: 1.9
PROTHROMBIN TIME, POC: NORMAL

## 2019-09-25 PROCEDURE — G8419 CALC BMI OUT NRM PARAM NOF/U: HCPCS | Performed by: NURSE PRACTITIONER

## 2019-09-25 PROCEDURE — 85610 PROTHROMBIN TIME: CPT | Performed by: NURSE PRACTITIONER

## 2019-09-25 PROCEDURE — G8598 ASA/ANTIPLAT THER USED: HCPCS | Performed by: NURSE PRACTITIONER

## 2019-09-25 PROCEDURE — 99213 OFFICE O/P EST LOW 20 MIN: CPT | Performed by: NURSE PRACTITIONER

## 2019-09-25 PROCEDURE — 1036F TOBACCO NON-USER: CPT | Performed by: NURSE PRACTITIONER

## 2019-09-25 PROCEDURE — 3017F COLORECTAL CA SCREEN DOC REV: CPT | Performed by: NURSE PRACTITIONER

## 2019-09-25 PROCEDURE — G8427 DOCREV CUR MEDS BY ELIG CLIN: HCPCS | Performed by: NURSE PRACTITIONER

## 2019-09-25 NOTE — PATIENT INSTRUCTIONS
of the week. 5)  Eat better - A healthy diet is one of your best weapons for fighting cardiovascular disease. When you eat a heart healthy diet, you improve your chances for feeling good and staying healthy for life. 6)  Lose weight - when you shed extra fat an unnecessary pounds, you reduce the burden on your hear, lungs, blood vessels and skeleton. You give yourself the gift of active living, you lower your blood pressure and help yourself feel better. 7) Stop smoking - cigarette smokers have a higher risk of developing cardiovascular disease. If  You smoke, quitting is the best thing you can do for your health. Check American Heart Association on line for more information on Life's Simple 7 and tips for healthy living. Patient Education        Taking Warfarin Safely: Care Instructions  Your Care Instructions    Warfarin is a medicine that you take to prevent blood clots. It is often called a blood thinner. Doctors give warfarin (such as Coumadin) to reduce the risk of blood clots. You may be at risk for blood clots if you have atrial fibrillation or deep vein thrombosis. Some other health problems may also put you at risk. Warfarin slows the amount of time it takes for your blood to clot. It can cause bleeding problems. Even if you've been taking warfarin for a while, it's important to know how to take it safely. Foods and other medicines can affect the way warfarin works. Some can make warfarin work too well. This can cause bleeding problems. And some can make it work poorly, so that it does not prevent blood clots very well. You will need regular blood tests to check how long it takes for your blood to form a clot. This test is called a PT or prothrombin time test. The result of the test is called an INR level. Depending on the test results, your doctor or anticoagulation clinic may adjust your dose of warfarin. Follow-up care is a key part of your treatment and safety.  Be sure to make and go

## 2019-11-07 ENCOUNTER — ANTI-COAG VISIT (OUTPATIENT)
Dept: CARDIOLOGY | Age: 64
End: 2019-11-07
Payer: MEDICARE

## 2019-11-07 DIAGNOSIS — I48.0 PAF (PAROXYSMAL ATRIAL FIBRILLATION) (HCC): Primary | ICD-10-CM

## 2019-11-07 LAB
INTERNATIONAL NORMALIZATION RATIO, POC: 2
PROTHROMBIN TIME, POC: NORMAL

## 2019-11-07 PROCEDURE — 85610 PROTHROMBIN TIME: CPT | Performed by: CLINICAL NURSE SPECIALIST

## 2019-12-20 ENCOUNTER — ANTI-COAG VISIT (OUTPATIENT)
Dept: CARDIOLOGY | Age: 64
End: 2019-12-20
Payer: MEDICARE

## 2019-12-20 DIAGNOSIS — I48.0 PAF (PAROXYSMAL ATRIAL FIBRILLATION) (HCC): Primary | ICD-10-CM

## 2019-12-20 LAB
INTERNATIONAL NORMALIZATION RATIO, POC: 2.2
PROTHROMBIN TIME, POC: NORMAL

## 2019-12-20 PROCEDURE — 85610 PROTHROMBIN TIME: CPT | Performed by: NURSE PRACTITIONER

## 2020-01-31 ENCOUNTER — ANTI-COAG VISIT (OUTPATIENT)
Dept: CARDIOLOGY | Age: 65
End: 2020-01-31
Payer: MEDICARE

## 2020-01-31 LAB
INTERNATIONAL NORMALIZATION RATIO, POC: 2
PROTHROMBIN TIME, POC: NORMAL

## 2020-01-31 PROCEDURE — 85610 PROTHROMBIN TIME: CPT | Performed by: NURSE PRACTITIONER

## 2020-02-03 DIAGNOSIS — G25.81 RESTLESS LEG SYNDROME: Primary | ICD-10-CM

## 2020-02-03 RX ORDER — BACLOFEN 10 MG/1
10 TABLET ORAL NIGHTLY
Qty: 90 TABLET | Refills: 1 | Status: SHIPPED | OUTPATIENT
Start: 2020-02-03 | End: 2020-07-23

## 2020-02-03 NOTE — TELEPHONE ENCOUNTER
PT STATES BACLOFEN WAS TO BE REQUESTED FROM [facility] 2 WEEKS AGO  IS NOW TOTALLY OUT  TAKES FOR LEG CRAMPS AND MUSCLE CRAMPS

## 2020-03-04 RX ORDER — CELECOXIB 100 MG/1
100 CAPSULE ORAL DAILY
Qty: 90 CAPSULE | Refills: 0 | Status: SHIPPED | OUTPATIENT
Start: 2020-03-04 | End: 2020-06-15

## 2020-03-04 RX ORDER — WARFARIN SODIUM 5 MG/1
TABLET ORAL
Qty: 180 TABLET | Refills: 0 | Status: SHIPPED | OUTPATIENT
Start: 2020-03-04 | End: 2020-06-30

## 2020-03-05 RX ORDER — LEVOTHYROXINE SODIUM 112 UG/1
112 TABLET ORAL DAILY
Qty: 90 TABLET | Refills: 3 | Status: SHIPPED | OUTPATIENT
Start: 2020-03-05 | End: 2021-03-08

## 2020-03-13 ENCOUNTER — ANTI-COAG VISIT (OUTPATIENT)
Dept: CARDIOLOGY | Age: 65
End: 2020-03-13
Payer: MEDICARE

## 2020-03-13 LAB
INTERNATIONAL NORMALIZATION RATIO, POC: 1.7
PROTHROMBIN TIME, POC: NORMAL

## 2020-03-13 PROCEDURE — 85610 PROTHROMBIN TIME: CPT | Performed by: CLINICAL NURSE SPECIALIST

## 2020-04-08 RX ORDER — ALLOPURINOL 300 MG/1
TABLET ORAL
Qty: 45 TABLET | Refills: 3 | Status: SHIPPED | OUTPATIENT
Start: 2020-04-08 | End: 2021-04-05

## 2020-04-22 DIAGNOSIS — E11.69 TYPE 2 DIABETES MELLITUS WITH OTHER SPECIFIED COMPLICATION, UNSPECIFIED WHETHER LONG TERM INSULIN USE (HCC): Primary | ICD-10-CM

## 2020-04-24 ENCOUNTER — ANTI-COAG VISIT (OUTPATIENT)
Dept: CARDIOLOGY | Age: 65
End: 2020-04-24
Payer: MEDICARE

## 2020-04-24 LAB
INTERNATIONAL NORMALIZATION RATIO, POC: 1.9
PROTHROMBIN TIME, POC: NORMAL

## 2020-04-24 PROCEDURE — 85610 PROTHROMBIN TIME: CPT | Performed by: CLINICAL NURSE SPECIALIST

## 2020-04-28 DIAGNOSIS — E11.40 DIABETIC NEUROPATHY, PAINFUL (HCC): Primary | ICD-10-CM

## 2020-04-28 RX ORDER — GABAPENTIN 100 MG/1
300 CAPSULE ORAL NIGHTLY
Qty: 90 CAPSULE | Refills: 5 | Status: SHIPPED | OUTPATIENT
Start: 2020-04-28 | End: 2021-01-12

## 2020-05-27 ENCOUNTER — OFFICE VISIT (OUTPATIENT)
Dept: INTERNAL MEDICINE | Facility: CLINIC | Age: 65
End: 2020-05-27

## 2020-05-27 VITALS
HEART RATE: 89 BPM | DIASTOLIC BLOOD PRESSURE: 80 MMHG | TEMPERATURE: 97 F | SYSTOLIC BLOOD PRESSURE: 126 MMHG | OXYGEN SATURATION: 99 % | WEIGHT: 208 LBS | HEIGHT: 74 IN | BODY MASS INDEX: 26.69 KG/M2

## 2020-05-27 DIAGNOSIS — I10 ESSENTIAL HYPERTENSION: ICD-10-CM

## 2020-05-27 DIAGNOSIS — I25.5 ISCHEMIC CARDIOMYOPATHY: ICD-10-CM

## 2020-05-27 DIAGNOSIS — I25.10 CORONARY ARTERY DISEASE INVOLVING NATIVE CORONARY ARTERY OF NATIVE HEART WITHOUT ANGINA PECTORIS: ICD-10-CM

## 2020-05-27 DIAGNOSIS — E08.43 DIABETES MELLITUS DUE TO UNDERLYING CONDITION WITH DIABETIC AUTONOMIC NEUROPATHY, WITHOUT LONG-TERM CURRENT USE OF INSULIN (HCC): ICD-10-CM

## 2020-05-27 DIAGNOSIS — E88.81 DYSMETABOLIC SYNDROME: ICD-10-CM

## 2020-05-27 DIAGNOSIS — E11.69 TYPE 2 DIABETES MELLITUS WITH OTHER SPECIFIED COMPLICATION, UNSPECIFIED WHETHER LONG TERM INSULIN USE (HCC): Primary | ICD-10-CM

## 2020-05-27 DIAGNOSIS — E78.00 HYPERCHOLESTEREMIA: ICD-10-CM

## 2020-05-27 PROBLEM — L84 CALLUS OF HEEL: Status: ACTIVE | Noted: 2020-05-27

## 2020-05-27 PROBLEM — E11.40 DIABETIC NEUROPATHY, PAINFUL (HCC): Status: ACTIVE | Noted: 2020-05-27

## 2020-05-27 PROBLEM — K58.9 IRRITABLE BOWEL SYNDROME: Status: ACTIVE | Noted: 2020-05-27

## 2020-05-27 PROBLEM — I21.9 ACUTE MYOCARDIAL INFARCTION, INITIAL EPISODE OF CARE: Status: ACTIVE | Noted: 2020-05-27

## 2020-05-27 PROBLEM — J30.2 SEASONAL ALLERGIC RHINITIS, UNSPECIFIED ALLERGIC RHINITIS TRIGGER: Status: ACTIVE | Noted: 2020-05-27

## 2020-05-27 PROBLEM — Z79.01 CHRONIC ANTICOAGULATION: Status: ACTIVE | Noted: 2017-05-18

## 2020-05-27 PROBLEM — E88.810 DYSMETABOLIC SYNDROME: Status: ACTIVE | Noted: 2020-05-27

## 2020-05-27 PROBLEM — K44.9 PARAESOPHAGEAL HIATAL HERNIA: Status: ACTIVE | Noted: 2019-05-25

## 2020-05-27 PROBLEM — E78.2 MIXED HYPERLIPIDEMIA: Status: ACTIVE | Noted: 2018-02-20

## 2020-05-27 PROBLEM — M20.41 HAMMER TOES OF BOTH FEET: Status: ACTIVE | Noted: 2020-05-27

## 2020-05-27 PROBLEM — J44.89 CHRONIC BRONCHITIS, OBSTRUCTIVE: Status: ACTIVE | Noted: 2020-05-27

## 2020-05-27 PROBLEM — G25.81 RESTLESS LEGS SYNDROME: Status: ACTIVE | Noted: 2020-05-27

## 2020-05-27 PROBLEM — M54.50 LOW BACK PAIN: Status: ACTIVE | Noted: 2020-05-27

## 2020-05-27 PROBLEM — M19.90 ARTHRITIS: Status: ACTIVE | Noted: 2020-05-27

## 2020-05-27 PROBLEM — J44.9 CHRONIC BRONCHITIS, OBSTRUCTIVE: Status: ACTIVE | Noted: 2020-05-27

## 2020-05-27 PROBLEM — M20.42 HAMMER TOES OF BOTH FEET: Status: ACTIVE | Noted: 2020-05-27

## 2020-05-27 PROBLEM — Z86.010 HISTORY OF COLON POLYPS: Status: ACTIVE | Noted: 2020-05-27

## 2020-05-27 PROBLEM — E11.9 DIABETES MELLITUS: Status: ACTIVE | Noted: 2020-05-27

## 2020-05-27 PROBLEM — I63.50 CEREBRAL ARTERY OCCLUSION WITH CEREBRAL INFARCTION: Status: ACTIVE | Noted: 2020-05-27

## 2020-05-27 PROBLEM — G62.9 PERIPHERAL NERVE DISEASE: Status: ACTIVE | Noted: 2020-05-27

## 2020-05-27 PROBLEM — Z86.0100 HISTORY OF COLON POLYPS: Status: ACTIVE | Noted: 2020-05-27

## 2020-05-27 LAB — HBA1C MFR BLD: 5.7 %

## 2020-05-27 PROCEDURE — 83036 HEMOGLOBIN GLYCOSYLATED A1C: CPT | Performed by: INTERNAL MEDICINE

## 2020-05-27 PROCEDURE — 99214 OFFICE O/P EST MOD 30 MIN: CPT | Performed by: INTERNAL MEDICINE

## 2020-05-27 RX ORDER — OMEPRAZOLE 40 MG/1
40 CAPSULE, DELAYED RELEASE ORAL DAILY
COMMUNITY

## 2020-05-27 RX ORDER — WARFARIN SODIUM 5 MG/1
5 TABLET ORAL
COMMUNITY

## 2020-05-27 NOTE — PROGRESS NOTES
Subjective   Jn Cárdenas is a 64 y.o. male.   Chief Complaint   Patient presents with   • Diabetes     A1C 5.7   • Hypertension       Is a follow-up for patient with hypertension diabetes hyperlipidemia known coronary artery disease cardiomyopathy.  He is currently being managed as far as his diabetes Mora and is doing quite well his A1c is in the 5.6 range.  He is taking his medications as prescribed.       The following portions of the patient's history were reviewed and updated as appropriate: allergies, current medications, past family history, past medical history, past social history, past surgical history and problem list.    Review of Systems   Constitutional: Negative for activity change, appetite change, fatigue, fever, unexpected weight gain and unexpected weight loss.   HENT: Negative for swollen glands, trouble swallowing and voice change.    Eyes: Negative for blurred vision and visual disturbance.   Respiratory: Negative for cough and shortness of breath.    Cardiovascular: Negative for chest pain, palpitations and leg swelling.   Gastrointestinal: Negative for abdominal pain, constipation, diarrhea, nausea, vomiting and indigestion.   Endocrine: Negative for cold intolerance, heat intolerance, polydipsia and polyphagia.   Genitourinary: Negative for dysuria and frequency.   Musculoskeletal: Negative for arthralgias, back pain, joint swelling and neck pain.   Skin: Negative for color change, rash and skin lesions.   Neurological: Negative for dizziness, weakness, headache, memory problem and confusion.   Hematological: Does not bruise/bleed easily.   Psychiatric/Behavioral: Negative for agitation, hallucinations and suicidal ideas. The patient is not nervous/anxious.        Objective   Past Medical History:   Diagnosis Date   • Arthritis    • Cataract    • Coronary artery disease    • Diabetes mellitus (CMS/HCC)    • Disease of thyroid gland    • Gout    • Hypertension    • Myocardial infarct,  old     24 yrs ago   • Neuropathy     in feet and legs   • Parotid tumor    • Spastic colon    • TIA (transient ischemic attack)     weakness on left upper extremity      Past Surgical History:   Procedure Laterality Date   • BACK SURGERY      had spinal fusion   • OTHER SURGICAL HISTORY      had goiter removed  at 4 yrs old   • OTHER SURGICAL HISTORY Right     had broken arm,   • PAROTIDECTOMY Left 3/21/2017    Procedure: PAROTID TUMOR EXCISION WITH FACIAL NERVE MONITORING,  DISSECTION LEFT;  Surgeon: Armond Leon MD;  Location: [facility] OR;  Service:         Current Outpatient Medications:   •  allopurinol (ZYLOPRIM) 300 MG tablet, Pt takes 1/2 tablet in evening time daily, Disp: 45 tablet, Rfl: 3  •  baclofen (LIORESAL) 10 MG tablet, Take 1 tablet by mouth Every Night. Can take up to 3 if needed nightly, Disp: 90 tablet, Rfl: 1  •  celecoxib (CELEBREX) 100 MG capsule, Take 1 capsule by mouth Daily., Disp: 90 capsule, Rfl: 0  •  Chromium (Cr-GTF) 200 MCG capsule, Take 1 capsule by mouth 2 (Two) Times a Day., Disp: , Rfl:   •  CloNIDine (CATAPRES) 0.1 MG tablet, Take 0.1 mg by mouth Daily., Disp: , Rfl:   •  Dapagliflozin Propanediol (Farxiga) 10 MG tablet, Take 1 tablet by mouth Daily., Disp: , Rfl:   •  Diphenhydramine-PSE-APAP (ALLERGY SINUS HEADACHE RELIEF PO), Take 1 tablet by mouth Every Night. May take 1-2 daily for sinus headache, Disp: , Rfl:   •  fluticasone (VERAMYST) 27.5 MCG/SPRAY nasal spray, 2 sprays into each nostril Daily., Disp: , Rfl:   •  gabapentin (NEURONTIN) 100 MG capsule, Take 3 capsules by mouth Every Night. Pt takes 1-3 capsules at beddtime as needed currently, Disp: 90 capsule, Rfl: 5  •  glucose blood test strip, 1 each by Other route 4 (Four) Times a Day. Use as instructed, Disp: 150 each, Rfl: 11  •  levothyroxine (SYNTHROID, LEVOTHROID) 112 MCG tablet, Take 1 tablet by mouth Daily., Disp: 90 tablet, Rfl: 3  •  Liraglutide (VICTOZA) 18 MG/3ML solution pen-injector, Inject 1.8  pens under the skin Every Night., Disp: , Rfl:   •  Magnesium 250 MG tablet, Take 1 tablet by mouth 2 (Two) Times a Day., Disp: , Rfl:   •  Multiple Vitamins-Minerals (ALIVE MENS ENERGY) tablet, Take 1 tablet by mouth Daily., Disp: , Rfl:   •  NON FORMULARY, 1 application As Needed (is over the counter rollon applies to  legs prn  max freeze)., Disp: , Rfl:   •  omeprazole (priLOSEC) 40 MG capsule, Take 40 mg by mouth Daily., Disp: , Rfl:   •  warfarin (COUMADIN) 5 MG tablet, Take 5 mg by mouth Daily. Take 7.5mg 3 days weekly, and 5mg 4 days weekly, Disp: , Rfl:      Vitals:    05/27/20 0848   BP: 126/80   Pulse: 89   Temp: 97 °F (36.1 °C)   SpO2: 99%         05/27/20 0848   Weight: 94.3 kg (208 lb)     Patient's Body mass index is 26.71 kg/m². BMI is above normal parameters. Recommendations include: nutrition counseling.      Physical Exam   Constitutional: He is oriented to person, place, and time. He appears well-developed and well-nourished.   HENT:   Head: Normocephalic and atraumatic.   Right Ear: External ear normal.   Left Ear: External ear normal.   Nose: Nose normal.   Mouth/Throat: Oropharynx is clear and moist.   Eyes: Pupils are equal, round, and reactive to light. Conjunctivae and EOM are normal.   Neck: Normal range of motion. Neck supple. No thyromegaly present.   Cardiovascular: Normal rate, regular rhythm, normal heart sounds and intact distal pulses.   Pulmonary/Chest: Effort normal and breath sounds normal.   Abdominal: Soft. Bowel sounds are normal.   Lymphadenopathy:     He has no cervical adenopathy.   Neurological: He is alert and oriented to person, place, and time.   Skin: Skin is warm and dry.   Psychiatric: He has a normal mood and affect. His behavior is normal. Thought content normal.   Nursing note and vitals reviewed.            Assessment/Plan   Diagnoses and all orders for this visit:    1. Type 2 diabetes mellitus with other specified complication, unspecified whether long term  insulin use (CMS/HCC) (Primary)  -     POC Glycosylated Hemoglobin (Hb A1C)    2. Coronary artery disease involving native coronary artery of native heart without angina pectoris    3. Ischemic cardiomyopathy    4. Essential hypertension  -     Basic Metabolic Panel    5. Dysmetabolic syndrome  -     ALT  -     AST  -     Lipid Panel    6. Hypercholesteremia    7. Diabetes mellitus due to underlying condition with diabetic autonomic neuropathy, without long-term current use of insulin (CMS/HCC)   -     Lipid Panel    Patient's diabetes is under good control.  He is not having chest pain so his coronary artery disease seems to be stable.  He is not having shortness of breath so his cardiomyopathy seems to compensated his blood pressure is well controlled as is his diabetes he is having lab work today to check on his cholesterol I am going to move his appointment back 6 months as he is under good control

## 2020-05-28 LAB
ALT SERPL-CCNC: 21 U/L (ref 1–41)
AST SERPL-CCNC: 23 U/L (ref 1–40)
BUN SERPL-MCNC: 27 MG/DL (ref 8–23)
BUN/CREAT SERPL: 23.3 (ref 7–25)
CALCIUM SERPL-MCNC: 9.4 MG/DL (ref 8.6–10.5)
CHLORIDE SERPL-SCNC: 104 MMOL/L (ref 98–107)
CHOLEST SERPL-MCNC: 128 MG/DL (ref 0–200)
CO2 SERPL-SCNC: 25.5 MMOL/L (ref 22–29)
CREAT SERPL-MCNC: 1.16 MG/DL (ref 0.76–1.27)
GLUCOSE SERPL-MCNC: 110 MG/DL (ref 65–99)
HDLC SERPL-MCNC: 29 MG/DL (ref 40–60)
LDLC SERPL CALC-MCNC: 64 MG/DL (ref 0–100)
POTASSIUM SERPL-SCNC: 4.6 MMOL/L (ref 3.5–5.2)
SODIUM SERPL-SCNC: 141 MMOL/L (ref 136–145)
TRIGL SERPL-MCNC: 177 MG/DL (ref 0–150)
VLDLC SERPL CALC-MCNC: 35.4 MG/DL

## 2020-06-05 ENCOUNTER — ANTI-COAG VISIT (OUTPATIENT)
Dept: CARDIOLOGY | Age: 65
End: 2020-06-05
Payer: MEDICARE

## 2020-06-05 LAB
INTERNATIONAL NORMALIZATION RATIO, POC: 1.8
PROTHROMBIN TIME, POC: NORMAL

## 2020-06-05 PROCEDURE — 85610 PROTHROMBIN TIME: CPT | Performed by: CLINICAL NURSE SPECIALIST

## 2020-06-15 RX ORDER — CELECOXIB 100 MG/1
CAPSULE ORAL
Qty: 90 CAPSULE | Refills: 3 | Status: SHIPPED | OUTPATIENT
Start: 2020-06-15

## 2020-06-30 RX ORDER — WARFARIN SODIUM 5 MG/1
TABLET ORAL
Qty: 180 TABLET | Refills: 0 | Status: SHIPPED | OUTPATIENT
Start: 2020-06-30 | End: 2020-10-02

## 2020-07-17 ENCOUNTER — ANTI-COAG VISIT (OUTPATIENT)
Dept: CARDIOLOGY | Age: 65
End: 2020-07-17
Payer: MEDICARE

## 2020-07-17 LAB
INTERNATIONAL NORMALIZATION RATIO, POC: 1.8
PROTHROMBIN TIME, POC: NORMAL

## 2020-07-17 PROCEDURE — 85610 PROTHROMBIN TIME: CPT | Performed by: CLINICAL NURSE SPECIALIST

## 2020-07-23 DIAGNOSIS — G25.81 RESTLESS LEG SYNDROME: ICD-10-CM

## 2020-07-23 RX ORDER — BACLOFEN 10 MG/1
TABLET ORAL
Qty: 90 TABLET | Refills: 3 | Status: SHIPPED | OUTPATIENT
Start: 2020-07-23 | End: 2021-11-10

## 2020-08-28 ENCOUNTER — OFFICE VISIT (OUTPATIENT)
Dept: CARDIOLOGY | Age: 65
End: 2020-08-28
Payer: MEDICARE

## 2020-08-28 VITALS
SYSTOLIC BLOOD PRESSURE: 126 MMHG | BODY MASS INDEX: 26.95 KG/M2 | HEART RATE: 71 BPM | HEIGHT: 74 IN | DIASTOLIC BLOOD PRESSURE: 78 MMHG | OXYGEN SATURATION: 96 % | WEIGHT: 210 LBS

## 2020-08-28 LAB
INTERNATIONAL NORMALIZATION RATIO, POC: 2.8
PROTHROMBIN TIME, POC: NORMAL

## 2020-08-28 PROCEDURE — 1036F TOBACCO NON-USER: CPT | Performed by: NURSE PRACTITIONER

## 2020-08-28 PROCEDURE — 85610 PROTHROMBIN TIME: CPT | Performed by: NURSE PRACTITIONER

## 2020-08-28 PROCEDURE — G8427 DOCREV CUR MEDS BY ELIG CLIN: HCPCS | Performed by: NURSE PRACTITIONER

## 2020-08-28 PROCEDURE — 93000 ELECTROCARDIOGRAM COMPLETE: CPT | Performed by: NURSE PRACTITIONER

## 2020-08-28 PROCEDURE — 99214 OFFICE O/P EST MOD 30 MIN: CPT | Performed by: NURSE PRACTITIONER

## 2020-08-28 PROCEDURE — G8419 CALC BMI OUT NRM PARAM NOF/U: HCPCS | Performed by: NURSE PRACTITIONER

## 2020-08-28 PROCEDURE — 3017F COLORECTAL CA SCREEN DOC REV: CPT | Performed by: NURSE PRACTITIONER

## 2020-08-28 RX ORDER — THIAMINE HCL 100 MG
TABLET ORAL
COMMUNITY
End: 2021-03-02 | Stop reason: ALTCHOICE

## 2020-08-28 ASSESSMENT — ENCOUNTER SYMPTOMS
CHEST TIGHTNESS: 0
SHORTNESS OF BREATH: 0
EYES NEGATIVE: 1
WHEEZING: 0
GASTROINTESTINAL NEGATIVE: 1
COUGH: 0
SORE THROAT: 0

## 2020-08-28 NOTE — PROGRESS NOTES
[facility]   Established Patient Office Visit   [facility]. [address] [facility]  [phone]        OFFICE VISIT:  2020    Kimberly Banegas - : 1955    Reason For Visit:  Libby Sandoval is a 59 y.o. male who is here for 6 Month Follow-Up (pt is not having any cardiac issues today)    1. PAF (paroxysmal atrial fibrillation) ([address]    2. Essential hypertension    3. Coronary artery disease involving native coronary artery of native heart without angina pectoris    4. Ischemic cardiomyopathy    5. Hyperlipidemia, unspecified hyperlipidemia type      Patient with a history of coronary artery disease, ischemic cardiomyopathy, hypertension, hyperlipidemia, and paroxysmal atrial fib. Patient presents to clinic today for 6-month follow-up. Patient denies any complaints of chest pain, pressure or tightness. There is no shortness of breath, orthopnea or PND. Patient denies any lightheadedness, dizziness or syncope.       DATA:    1998  SE  negative for myocardial ischemia  2001  Cath  NCA with anterior/apical akinesis  2002  Echo  EF  45%  2003  cardiolite  negative for myocardial ischemia, EF 58%  2004  SE  negative for myocardial ischemia  2008  Echo  Normal LVFX, diastolic dysfunction\    SE  negative for myocardial ischemia  02/15/2012  SE  negative for myocardial ischemia [facility])  16 Echo mild-mod MR, mild AR, EF 49%        Subjective    Kimberly Banegas is a 59 y.o. male with the following history as recorded in Epic[employer]:    Patient Active Problem List    Diagnosis Date Noted    Syncope 2012     Priority: High    Cardiomyopathy ([address] 2011     Priority: High    Chest pain 2019    Chronic anticoagulation 2017    PAF (paroxysmal atrial fibrillation) ([address] 2015    MI (myocardial infarction) ([address]     CAD (coronary artery disease)     Gout 2012    Diabetes mellitus ([address]     Cerebral artery occlusion with cerebral infarction ([facility]     Hyperlipidemia     Hypertension      Current Outpatient Medications   Medication Sig Dispense Refill    Magnesium 500 MG TABS Take by mouth      warfarin (COUMADIN) 5 MG tablet TAKE 1 TO 2 TABLETS BY MOUTH ONCE DAILY 180 tablet 0    Chlorphen-Pseudoephed-APAP (CORICIDIN D PO) Take by mouth as needed      Chromium (GTF CHROMIUM) 200 MCG TABS Take 200 mcg by mouth daily      vitamin B-12 (CYANOCOBALAMIN) 1000 MCG tablet Take 1,000 mcg by mouth 2 times daily      gabapentin (NEURONTIN) 100 MG capsule Take 100 mg by mouth 3 times daily as needed. Jer He cloNIDine (CATAPRES) 0.1 MG tablet Take 1 tablet by mouth daily 90 tablet 3    dapagliflozin (FARXIGA) 10 MG tablet Take 10 mg by mouth every morning      VICTOZA 18 MG/3ML SOPN SC injection Inject 1.8 mg into the skin daily       meloxicam (MOBIC) 7.5 MG tablet Take 7.5 mg by mouth daily       fluticasone (FLONASE) 50 MCG/ACT nasal spray 2 sprays by Nasal route daily       allopurinol (ZYLOPRIM) 300 MG tablet Take 0.5 tablet daily      baclofen (LIORESAL) 10 MG tablet Take 10 mg by mouth 2 times daily       levothyroxine (SYNTHROID) 112 MCG tablet Take 112 mcg by mouth daily       omeprazole (PRILOSEC) 40 MG delayed release capsule Take 40 mg by mouth daily  2     No current facility-administered medications for this visit. Allergies: Patient has no known allergies. Past Medical History:   Diagnosis Date    CAD (coronary artery disease)     Cardiomyopathy ([facility] 6/23/2011    Diabetes mellitus ([facility]     Type 2    Gout 2/8/2012    Hypercholesteremia     Hyperlipidemia     Hypertension     MI (myocardial infarction) ([facility]     Other chest pain     history of conversion reaction.  Other primary cardiomyopathies     Syncope 2/8/2012    history of near syncopal episodes.  Unspecified cerebral artery occlusion with cerebral infarction     history of stroke.      Past Surgical History:   Procedure Laterality Date  BACK SURGERY  1974    spinal fusion-lumbar    CARDIAC CATHETERIZATION  02/21/06  JDT    Left heart cath    CARDIAC CATHETERIZATION  02/21/2001    EF is 75%. See scanned document.-Dr Leah Herman CARDIAC SURGERY  05/26/2006    Loop recorder placed-Dr Rufino Koch per patient    OTHER SURGICAL HISTORY      Goiter in throat removed as a child     Family History   Problem Relation Age of Onset    Heart Disease Mother     High Blood Pressure Mother     Heart Disease Father     High Blood Pressure Father     Prostate Cancer Father     Heart Disease Brother     Colon Polyps Maternal Grandmother     Ulcerative Colitis Maternal Grandmother     Colon Cancer Neg Hx     Esophageal Cancer Neg Hx     Lung Cancer Neg Hx     Liver Disease Neg Hx     Rectal Cancer Neg Hx     Stomach Cancer Neg Hx      Social History     Tobacco Use    Smoking status: Never Smoker    Smokeless tobacco: Never Used   Substance Use Topics    Alcohol use: No          Review of Systems:    Review of Systems   Constitutional: Negative for activity change, chills, diaphoresis, fatigue and fever. HENT: Negative for congestion and sore throat. Eyes: Negative. Respiratory: Negative for cough, chest tightness, shortness of breath and wheezing. Cardiovascular: Negative for chest pain, palpitations and leg swelling. Gastrointestinal: Negative. Genitourinary: Negative. Musculoskeletal: Negative. Neurological: Negative for dizziness, syncope, light-headedness and headaches. Psychiatric/Behavioral: Negative for confusion. The patient is not nervous/anxious.          Objective:    /78   Pulse 71   Ht 6' 2\" (1.88 m)   Wt 210 lb (95.3 kg)   SpO2 96%   BMI 26.96 kg/m²     GENERAL - well developed and well nourished, conversant  HEENT -  PERRLA, Hearing appears normal, gentleman lids are normal.  External inspection of ears and nose appear normal.  NECK - no thyromegaly, no JVD, trachea is in the midline  CARDIOVASCULAR - PMI is in the mid line clavicular position, Normal S1 and S2 with a grade 1/6 systolic murmur. No S3 or S4    PULMONARY - no respiratory distress. No wheezes or rales. Lungs are clear to ausculation, normal respiratory effort. ABDOMEN  - soft, non tender, no rebound  MUSCULOSKELETAL  - range of motion of the upper and lower extermites appears normal and equal and is without pain   EXTREMITIES - no significant edema   NEUROLOGIC - gait and station are normal  SKIN - turgor is normal, can warm and dry. PSYCHIATRIC - normal mood and affect, alert and orientated x 3,      ASSESSMENT:    ALL THE CARDIOLOGY PROBLEMS ARE LISTED ABOVE; HOWEVER, THE FOLLOWING SPECIFIC CARDIAC PROBLEMS / CONDITIONS WERE ADDRESSED AND TREATED DURING THE OFFICE VISIT TODAY:                                                                                            MEDICAL DECISION MAKING             Cardiac Specific Problem / Diagnosis  Discussion and Data Reviewed Diagnostic Procedures Ordered Management Options Selected           1. PAF   Stable   Review and summation of old records:    EKG in the office showing sinus rhythm with a heart rate of 68 bpm.  Patient is on Coumadin 5 mg dose. INR today 2.8. Yes Continue current medications:     Yes           2. Ischemic cardiomyopathy  Stable   No overt signs of failure. Asymptomatic No Continue current medications:    Yes           3. Hypertension Well Controlled  blood pressure in the office today 126/78. O2 sat 96%. No Continue current medications:       Yes           4. CAD Stable No chest pain. No acute changes on EKG  Yes Continue current medications:       Yes     Orders Placed This Encounter   Procedures    POCT INR    EKG 12 lead     Order Specific Question:   Reason for Exam?     Answer:   Hypertension     No orders of the defined types were placed in this encounter. Discussed with patient.     Return in about 6 months (around 2/28/2021) for Routine with me . I greatly appreciate the opportunity to meet Suki Aldana and your confidence in allowing me to participate in his cardiovascular care. Trudee Kehr, APRN - NP  8/28/2020 9:07 AM CDT                    This dictation was generated by voice recognition computer software. Although all attempts are made to edit dictation for accuracy, there may be errors in the transcription that are not intended.

## 2020-10-02 RX ORDER — WARFARIN SODIUM 5 MG/1
TABLET ORAL
Qty: 180 TABLET | Refills: 0 | Status: SHIPPED | OUTPATIENT
Start: 2020-10-02 | End: 2021-01-12

## 2020-10-09 ENCOUNTER — ANTI-COAG VISIT (OUTPATIENT)
Dept: CARDIOLOGY | Age: 65
End: 2020-10-09
Payer: MEDICARE

## 2020-10-09 LAB
INTERNATIONAL NORMALIZATION RATIO, POC: 1.7
PROTHROMBIN TIME, POC: 19.8

## 2020-10-09 PROCEDURE — 85610 PROTHROMBIN TIME: CPT | Performed by: NURSE PRACTITIONER

## 2020-11-06 ENCOUNTER — ANTI-COAG VISIT (OUTPATIENT)
Dept: CARDIOLOGY | Age: 65
End: 2020-11-06
Payer: MEDICARE

## 2020-11-06 LAB
INTERNATIONAL NORMALIZATION RATIO, POC: 3.6
PROTHROMBIN TIME, POC: NORMAL

## 2020-11-06 PROCEDURE — 85610 PROTHROMBIN TIME: CPT | Performed by: CLINICAL NURSE SPECIALIST

## 2020-11-30 ENCOUNTER — OFFICE VISIT (OUTPATIENT)
Dept: INTERNAL MEDICINE | Facility: CLINIC | Age: 65
End: 2020-11-30

## 2020-11-30 VITALS
HEART RATE: 71 BPM | TEMPERATURE: 97.1 F | BODY MASS INDEX: 26.95 KG/M2 | HEIGHT: 74 IN | WEIGHT: 210 LBS | SYSTOLIC BLOOD PRESSURE: 122 MMHG | OXYGEN SATURATION: 99 % | DIASTOLIC BLOOD PRESSURE: 80 MMHG

## 2020-11-30 DIAGNOSIS — I25.10 CORONARY ARTERY DISEASE INVOLVING NATIVE CORONARY ARTERY OF NATIVE HEART WITHOUT ANGINA PECTORIS: ICD-10-CM

## 2020-11-30 DIAGNOSIS — E11.69 TYPE 2 DIABETES MELLITUS WITH OTHER SPECIFIED COMPLICATION, UNSPECIFIED WHETHER LONG TERM INSULIN USE (HCC): Primary | ICD-10-CM

## 2020-11-30 DIAGNOSIS — E11.40 DIABETIC NEUROPATHY, PAINFUL (HCC): ICD-10-CM

## 2020-11-30 DIAGNOSIS — I10 ESSENTIAL HYPERTENSION: ICD-10-CM

## 2020-11-30 DIAGNOSIS — E11.9 ENCOUNTER FOR DIABETIC FOOT EXAM (HCC): ICD-10-CM

## 2020-11-30 DIAGNOSIS — E03.9 ACQUIRED HYPOTHYROIDISM: ICD-10-CM

## 2020-11-30 LAB — HBA1C MFR BLD: 5.8 %

## 2020-11-30 PROCEDURE — 99214 OFFICE O/P EST MOD 30 MIN: CPT | Performed by: INTERNAL MEDICINE

## 2020-11-30 PROCEDURE — 83036 HEMOGLOBIN GLYCOSYLATED A1C: CPT | Performed by: INTERNAL MEDICINE

## 2020-11-30 NOTE — PROGRESS NOTES
Subjective   Jn Cárdenas is a 65 y.o. male.   Chief Complaint   Patient presents with   • Hypertension     6 month follow up    • Diabetes     A1C: 5.8       Patient is a well-controlled diabetic he is followed by [place] endocrinologist as well as being seen here.  He has a history of coronary artery disease a history of diabetic neuropathy.  We are following him for diabetes coronary artery disease and diabetic neuropathy.  Is not had any new problems there is no new chest pain feeling in his feet and the pain has not changed significantly in the last 6 months.       The following portions of the patient's history were reviewed and updated as appropriate: allergies, current medications, past family history, past medical history, past social history, past surgical history and problem list.    Review of Systems   Constitutional: Negative for activity change, appetite change, fatigue, fever, unexpected weight gain and unexpected weight loss.   HENT: Negative for swollen glands, trouble swallowing and voice change.    Eyes: Negative for blurred vision and visual disturbance.   Respiratory: Negative for cough and shortness of breath.    Cardiovascular: Negative for chest pain, palpitations and leg swelling.   Gastrointestinal: Negative for abdominal pain, constipation, diarrhea, nausea, vomiting and indigestion.   Endocrine: Negative for cold intolerance, heat intolerance, polydipsia and polyphagia.   Genitourinary: Negative for dysuria and frequency.   Musculoskeletal: Negative for arthralgias, back pain, joint swelling and neck pain.   Skin: Negative for color change, rash and skin lesions.   Neurological: Positive for numbness (Numbness and mild pain both feet). Negative for dizziness, weakness, headache, memory problem and confusion.   Hematological: Does not bruise/bleed easily.   Psychiatric/Behavioral: Negative for agitation, hallucinations and suicidal ideas. The patient is not nervous/anxious.         Objective   Past Medical History:   Diagnosis Date   • Arthritis    • Cataract    • Coronary artery disease    • Diabetes mellitus (CMS/HCC)    • Disease of thyroid gland    • Gout    • Hypertension    • Myocardial infarct, old     24 yrs ago   • Neuropathy     in feet and legs   • Parotid tumor    • Spastic colon    • TIA (transient ischemic attack)     weakness on left upper extremity      Past Surgical History:   Procedure Laterality Date   • BACK SURGERY      had spinal fusion   • OTHER SURGICAL HISTORY      had goiter removed  at 4 yrs old   • OTHER SURGICAL HISTORY Right     had broken arm,   • PAROTIDECTOMY Left 3/21/2017    Procedure: PAROTID TUMOR EXCISION WITH FACIAL NERVE MONITORING,  DISSECTION LEFT;  Surgeon: Armond Leon MD;  Location: [facility] OR;  Service:         Current Outpatient Medications:   •  allopurinol (ZYLOPRIM) 300 MG tablet, Pt takes 1/2 tablet in evening time daily, Disp: 45 tablet, Rfl: 3  •  baclofen (LIORESAL) 10 MG tablet, TAKE 1 TABLET BY MOUTH EVERY NIGHT. CAN TAKE UP TO 3 TABLETS IF NEEDED NIGHTLY, Disp: 90 tablet, Rfl: 3  •  celecoxib (CeleBREX) 100 MG capsule, Take 1 capsule by mouth once daily, Disp: 90 capsule, Rfl: 3  •  Chromium (Cr-GTF) 200 MCG capsule, Take 1 capsule by mouth 2 (Two) Times a Day., Disp: , Rfl:   •  CloNIDine (CATAPRES) 0.1 MG tablet, Take 0.1 mg by mouth Daily., Disp: , Rfl:   •  Dapagliflozin Propanediol (Farxiga) 10 MG tablet, Take 1 tablet by mouth Daily., Disp: , Rfl:   •  Diphenhydramine-PSE-APAP (ALLERGY SINUS HEADACHE RELIEF PO), Take 1 tablet by mouth Every Night. May take 1-2 daily for sinus headache, Disp: , Rfl:   •  fluticasone (VERAMYST) 27.5 MCG/SPRAY nasal spray, 2 sprays into each nostril Daily., Disp: , Rfl:   •  gabapentin (NEURONTIN) 100 MG capsule, Take 3 capsules by mouth Every Night. Pt takes 1-3 capsules at beddtime as needed currently, Disp: 90 capsule, Rfl: 5  •  glucose blood test strip, 1 each by Other route 4 (Four)  Times a Day. Use as instructed, Disp: 150 each, Rfl: 11  •  levothyroxine (SYNTHROID, LEVOTHROID) 112 MCG tablet, Take 1 tablet by mouth Daily., Disp: 90 tablet, Rfl: 3  •  Liraglutide (VICTOZA) 18 MG/3ML solution pen-injector, Inject 1.8 pens under the skin Every Night., Disp: , Rfl:   •  Multiple Vitamins-Minerals (ALIVE MENS ENERGY) tablet, Take 1 tablet by mouth Daily., Disp: , Rfl:   •  NON FORMULARY, 1 application As Needed (is over the counter rollon applies to  legs prn  max freeze)., Disp: , Rfl:   •  omeprazole (priLOSEC) 40 MG capsule, Take 40 mg by mouth Daily., Disp: , Rfl:   •  warfarin (COUMADIN) 5 MG tablet, Take 5 mg by mouth Daily. Take 7.5mg 3 days weekly, and 5mg 4 days weekly, Disp: , Rfl:      Vitals:    11/30/20 0948   BP: 122/80   Pulse: 71   Temp: 97.1 °F (36.2 °C)   SpO2: 99%         11/30/20  0948   Weight: 95.3 kg (210 lb)     Patient's Body mass index is 26.96 kg/m². BMI is .      Physical Exam  Vitals signs and nursing note reviewed.   Constitutional:       General: He is not in acute distress.     Appearance: Normal appearance. He is well-developed.   HENT:      Head: Normocephalic and atraumatic.      Right Ear: External ear normal.      Left Ear: External ear normal.      Nose: Nose normal.   Eyes:      Extraocular Movements: Extraocular movements intact.      Conjunctiva/sclera: Conjunctivae normal.      Pupils: Pupils are equal, round, and reactive to light.   Neck:      Musculoskeletal: Normal range of motion and neck supple. No neck rigidity or muscular tenderness.   Cardiovascular:      Rate and Rhythm: Normal rate and regular rhythm.      Pulses:           Dorsalis pedis pulses are 1+ on the right side and 1+ on the left side.        Posterior tibial pulses are 1+ on the right side and 1+ on the left side.      Heart sounds: Normal heart sounds.   Pulmonary:      Effort: Pulmonary effort is normal.      Breath sounds: Normal breath sounds.   Abdominal:      General: Bowel  sounds are normal.      Palpations: Abdomen is soft.   Musculoskeletal: Normal range of motion.   Feet:      Right foot:      Protective Sensation: 2 sites tested. 2 sites sensed.      Skin integrity: Erythema and callus present.      Left foot:      Protective Sensation: 2 sites tested. 2 sites sensed.      Skin integrity: Erythema and callus present.   Skin:     General: Skin is warm and dry.   Neurological:      General: No focal deficit present.      Mental Status: He is alert and oriented to person, place, and time.   Psychiatric:         Mood and Affect: Mood normal.         Behavior: Behavior normal.               Assessment/Plan   Diagnoses and all orders for this visit:    1. Type 2 diabetes mellitus with other specified complication, unspecified whether long term insulin use (CMS/[facility]) (Primary)  -     POC Glycosylated Hemoglobin (Hb A1C)    2. Encounter for diabetic foot exam (CMS/[facility])  -     POC Glycosylated Hemoglobin (Hb A1C)    3. Coronary artery disease involving native coronary artery of native heart without angina pectoris    4. Essential hypertension    5. Diabetic neuropathy, painful (CMS/[facility])    6. Acquired hypothyroidism      Patient is doing really well very well from his diabetic standpoint his A1c is below 6 and under control.  He is not having any chest pain in regard to his coronary artery disease.  His blood pressure is excellent he is taking his medications as prescribed we are ordering some lab work will also send a copy of that to his diabetic specialist and [place].

## 2020-12-01 LAB
ALBUMIN SERPL-MCNC: 4.7 G/DL (ref 3.5–5.2)
ALBUMIN/GLOB SERPL: 1.9 G/DL
ALP SERPL-CCNC: 78 U/L (ref 39–117)
ALT SERPL-CCNC: 33 U/L (ref 1–41)
AST SERPL-CCNC: 28 U/L (ref 1–40)
BILIRUB SERPL-MCNC: 0.7 MG/DL (ref 0–1.2)
BUN SERPL-MCNC: 22 MG/DL (ref 8–23)
BUN/CREAT SERPL: 17.9 (ref 7–25)
CALCIUM SERPL-MCNC: 9 MG/DL (ref 8.6–10.5)
CHLORIDE SERPL-SCNC: 101 MMOL/L (ref 98–107)
CHOLEST SERPL-MCNC: 143 MG/DL (ref 0–200)
CO2 SERPL-SCNC: 27.4 MMOL/L (ref 22–29)
CREAT SERPL-MCNC: 1.23 MG/DL (ref 0.76–1.27)
GLOBULIN SER CALC-MCNC: 2.5 GM/DL
GLUCOSE SERPL-MCNC: 89 MG/DL (ref 65–99)
HDLC SERPL-MCNC: 30 MG/DL (ref 40–60)
LDLC SERPL CALC-MCNC: 73 MG/DL (ref 0–100)
MAGNESIUM SERPL-MCNC: 2.4 MG/DL (ref 1.6–2.4)
MICROALBUMIN UR-MCNC: <3 UG/ML
POTASSIUM SERPL-SCNC: 4.4 MMOL/L (ref 3.5–5.2)
PROT SERPL-MCNC: 7.2 G/DL (ref 6–8.5)
SODIUM SERPL-SCNC: 140 MMOL/L (ref 136–145)
T4 FREE SERPL-MCNC: 1.23 NG/DL (ref 0.93–1.7)
TRIGL SERPL-MCNC: 245 MG/DL (ref 0–150)
TSH SERPL DL<=0.005 MIU/L-ACNC: 2.58 UIU/ML (ref 0.27–4.2)
VLDLC SERPL CALC-MCNC: 40 MG/DL (ref 5–40)

## 2020-12-18 ENCOUNTER — ANTI-COAG VISIT (OUTPATIENT)
Dept: CARDIOLOGY | Age: 65
End: 2020-12-18
Payer: MEDICARE

## 2020-12-18 LAB
INTERNATIONAL NORMALIZATION RATIO, POC: 2.2
PROTHROMBIN TIME, POC: NORMAL

## 2020-12-18 PROCEDURE — 85610 PROTHROMBIN TIME: CPT | Performed by: CLINICAL NURSE SPECIALIST

## 2021-01-12 DIAGNOSIS — E11.40 DIABETIC NEUROPATHY, PAINFUL (HCC): ICD-10-CM

## 2021-01-12 RX ORDER — GABAPENTIN 100 MG/1
CAPSULE ORAL
Qty: 90 CAPSULE | Refills: 5 | Status: SHIPPED | OUTPATIENT
Start: 2021-01-12 | End: 2021-07-13

## 2021-01-12 RX ORDER — WARFARIN SODIUM 5 MG/1
TABLET ORAL
Qty: 180 TABLET | Refills: 3 | Status: SHIPPED | OUTPATIENT
Start: 2021-01-12 | End: 2022-02-22

## 2021-01-28 ENCOUNTER — TELEPHONE (OUTPATIENT)
Dept: CARDIOLOGY CLINIC | Age: 66
End: 2021-01-28

## 2021-01-29 ENCOUNTER — ANTI-COAG VISIT (OUTPATIENT)
Dept: CARDIOLOGY CLINIC | Age: 66
End: 2021-01-29
Payer: MEDICARE

## 2021-01-29 DIAGNOSIS — I48.0 PAF (PAROXYSMAL ATRIAL FIBRILLATION) (HCC): Primary | ICD-10-CM

## 2021-01-29 LAB
INTERNATIONAL NORMALIZATION RATIO, POC: 2.2
PROTHROMBIN TIME, POC: NORMAL

## 2021-01-29 PROCEDURE — 85610 PROTHROMBIN TIME: CPT | Performed by: CLINICAL NURSE SPECIALIST

## 2021-03-02 ENCOUNTER — OFFICE VISIT (OUTPATIENT)
Dept: CARDIOLOGY CLINIC | Age: 66
End: 2021-03-02
Payer: MEDICARE

## 2021-03-02 VITALS
WEIGHT: 213 LBS | HEART RATE: 61 BPM | SYSTOLIC BLOOD PRESSURE: 134 MMHG | HEIGHT: 74 IN | DIASTOLIC BLOOD PRESSURE: 80 MMHG | BODY MASS INDEX: 27.34 KG/M2 | OXYGEN SATURATION: 95 %

## 2021-03-02 DIAGNOSIS — I25.10 CORONARY ARTERY DISEASE INVOLVING NATIVE CORONARY ARTERY OF NATIVE HEART WITHOUT ANGINA PECTORIS: Primary | ICD-10-CM

## 2021-03-02 DIAGNOSIS — I10 ESSENTIAL HYPERTENSION: ICD-10-CM

## 2021-03-02 DIAGNOSIS — I48.0 PAF (PAROXYSMAL ATRIAL FIBRILLATION) (HCC): ICD-10-CM

## 2021-03-02 DIAGNOSIS — E78.5 HYPERLIPIDEMIA, UNSPECIFIED HYPERLIPIDEMIA TYPE: ICD-10-CM

## 2021-03-02 LAB
INTERNATIONAL NORMALIZATION RATIO, POC: 2.2
PROTHROMBIN TIME, POC: NORMAL

## 2021-03-02 PROCEDURE — 1036F TOBACCO NON-USER: CPT | Performed by: NURSE PRACTITIONER

## 2021-03-02 PROCEDURE — 85610 PROTHROMBIN TIME: CPT | Performed by: NURSE PRACTITIONER

## 2021-03-02 PROCEDURE — G8427 DOCREV CUR MEDS BY ELIG CLIN: HCPCS | Performed by: NURSE PRACTITIONER

## 2021-03-02 PROCEDURE — G8417 CALC BMI ABV UP PARAM F/U: HCPCS | Performed by: NURSE PRACTITIONER

## 2021-03-02 PROCEDURE — 4040F PNEUMOC VAC/ADMIN/RCVD: CPT | Performed by: NURSE PRACTITIONER

## 2021-03-02 PROCEDURE — 3017F COLORECTAL CA SCREEN DOC REV: CPT | Performed by: NURSE PRACTITIONER

## 2021-03-02 PROCEDURE — 93000 ELECTROCARDIOGRAM COMPLETE: CPT | Performed by: NURSE PRACTITIONER

## 2021-03-02 PROCEDURE — G8484 FLU IMMUNIZE NO ADMIN: HCPCS | Performed by: NURSE PRACTITIONER

## 2021-03-02 PROCEDURE — 1123F ACP DISCUSS/DSCN MKR DOCD: CPT | Performed by: NURSE PRACTITIONER

## 2021-03-02 PROCEDURE — 99214 OFFICE O/P EST MOD 30 MIN: CPT | Performed by: NURSE PRACTITIONER

## 2021-03-02 ASSESSMENT — ENCOUNTER SYMPTOMS
COUGH: 0
CHEST TIGHTNESS: 0
WHEEZING: 0
SHORTNESS OF BREATH: 0
SORE THROAT: 0

## 2021-03-02 NOTE — PROGRESS NOTES
[facility]   Established Patient Office Visit   [facility]. [address] [facility]  [phone]        OFFICE VISIT:  3/2/2021    Patricio Concepcionmarvin Selene - : 1955    Reason For Visit:  Leila Pelletier is a 72 y.o. male who is here for 6 Month Follow-Up and Atrial Fibrillation    1. Coronary artery disease involving native coronary artery of native heart without angina pectoris    2. PAF (paroxysmal atrial fibrillation) ([address]    3. Essential hypertension    4. Hyperlipidemia, unspecified hyperlipidemia type        Patient with a history of coronary artery disease, ischemic cardiomyopathy, hypertension, hyperlipidemia, and paroxysmal atrial fib.     Patient presents to clinic today for 6-month follow-up. Patient denies any complaints of chest pain, pressure or tightness. There is no shortness of breath, orthopnea or PND. Patient denies any lightheadedness, dizziness or syncope.        DATA:     1998  SE  negative for myocardial ischemia  2001  Cath  NCA with anterior/apical akinesis  2002  Echo  EF  45%  2003  cardiolite  negative for myocardial ischemia, EF 58%  2004  SE  negative for myocardial ischemia  2008  Echo  Normal LVFX, diastolic dysfunction\  3/87/5726  SE  negative for myocardial ischemia  02/15/2012  SE  negative for myocardial ischemia [facility])  16 Echo mild-mod MR, mild AR, EF 49%             Karthik Mcdaniels is a 72 y.o. male with the following history as recorded in [facility]:    Patient Active Problem List    Diagnosis Date Noted    Syncope 2012     Priority: High    Cardiomyopathy ([address] 2011     Priority: High    Chest pain 2019    Chronic anticoagulation 2017    PAF (paroxysmal atrial fibrillation) ([address] 2015    MI (myocardial infarction) ([address]     CAD (coronary artery disease)     Gout 2012    Diabetes mellitus ([address]     Cerebral artery occlusion with cerebral infarction ([address]  Hyperlipidemia     Hypertension      Current Outpatient Medications   Medication Sig Dispense Refill    warfarin (COUMADIN) 5 MG tablet TAKE 1 TO 2 TABLETS BY MOUTH ONCE DAILY 180 tablet 3    Chlorphen-Pseudoephed-APAP (CORICIDIN D PO) Take by mouth as needed      omeprazole (PRILOSEC) 40 MG delayed release capsule Take 40 mg by mouth daily  2    Chromium (GTF CHROMIUM) 200 MCG TABS Take 200 mcg by mouth daily      vitamin B-12 (CYANOCOBALAMIN) 1000 MCG tablet Take 1,000 mcg by mouth 2 times daily      gabapentin (NEURONTIN) 100 MG capsule Take 100 mg by mouth 3 times daily as needed. Dax Leslie cloNIDine (CATAPRES) 0.1 MG tablet Take 1 tablet by mouth daily 90 tablet 3    VICTOZA 18 MG/3ML SOPN SC injection Inject 1.8 mg into the skin daily       fluticasone (FLONASE) 50 MCG/ACT nasal spray 2 sprays by Nasal route daily       allopurinol (ZYLOPRIM) 300 MG tablet Take 0.5 tablet daily      baclofen (LIORESAL) 10 MG tablet Take 10 mg by mouth 2 times daily       levothyroxine (SYNTHROID) 112 MCG tablet Take 112 mcg by mouth daily        No current facility-administered medications for this visit. Allergies: Patient has no known allergies. Past Medical History:   Diagnosis Date    CAD (coronary artery disease)     Cardiomyopathy [facility] 6/23/2011    Diabetes mellitus ([facility]     Type 2    Gout 2/8/2012    Hypercholesteremia     Hyperlipidemia     Hypertension     MI (myocardial infarction) ([facility]     Other chest pain     history of conversion reaction.  Other primary cardiomyopathies     Syncope 2/8/2012    history of near syncopal episodes.  Unspecified cerebral artery occlusion with cerebral infarction     history of stroke. Past Surgical History:   Procedure Laterality Date    BACK SURGERY  1974    spinal fusion-lumbar    CARDIAC CATHETERIZATION  02/21/06  JDT    Left heart cath    CARDIAC CATHETERIZATION  02/21/2001    EF is 75%.   See scanned document.-Dr Nura Baker [facility] CARDIAC SURGERY  05/26/2006    Loop recorder placed-Dr Varsha Mcnair per patient    OTHER SURGICAL HISTORY      Goiter in throat removed as a child     Family History   Problem Relation Age of Onset    Heart Disease Mother     High Blood Pressure Mother     Heart Disease Father     High Blood Pressure Father     Prostate Cancer Father     Heart Disease Brother     Colon Polyps Maternal Grandmother     Ulcerative Colitis Maternal Grandmother     Colon Cancer Neg Hx     Esophageal Cancer Neg Hx     Lung Cancer Neg Hx     Liver Disease Neg Hx     Rectal Cancer Neg Hx     Stomach Cancer Neg Hx      Social History     Tobacco Use    Smoking status: Never Smoker    Smokeless tobacco: Never Used   Substance Use Topics    Alcohol use: No          Review of Systems:    Review of Systems   Constitutional: Negative for activity change, chills, diaphoresis, fatigue and fever. HENT: Negative for sore throat. Respiratory: Negative for cough, chest tightness, shortness of breath and wheezing. Cardiovascular: Negative for chest pain, palpitations and leg swelling. Genitourinary: Negative. Musculoskeletal: Negative. Neurological: Negative for dizziness, syncope, light-headedness and headaches. Psychiatric/Behavioral: Negative for confusion. The patient is not nervous/anxious. Objective:    /80   Pulse 61   Ht 6' 2\" (1.88 m)   Wt 213 lb (96.6 kg)   SpO2 95%   BMI 27.35 kg/m²     GENERAL - well developed and well nourished, conversant  HEENT   PERRLA, Hearing appears normal, gentleman lids are normal.  External inspection of ears and nose appear normal.  NECK - no thyromegaly, no JVD, trachea is in the midline  CARDIOVASCULAR  PMI is in the mid line clavicular position, Normal S1 and S2 with a grade 1/6 systolic murmur.   No S3 or S4 PULMONARY  no respiratory distress. No wheezes or rales. Lungs are clear to ausculation, normal respiratory effort. ABDOMEN   soft, non tender, no rebound  MUSCULOSKELETAL   range of motion of the upper and lower extermites appears normal and equal and is without pain   EXTREMITIES - no significant edema   NEUROLOGIC  gait and station are normal  SKIN - turgor is normal, can warm and dry. PSYCHIATRIC - normal mood and affect, alert and orientated x 3,      ASSESSMENT:    ALL THE CARDIOLOGY PROBLEMS ARE LISTED ABOVE; HOWEVER, THE FOLLOWING SPECIFIC CARDIAC PROBLEMS / CONDITIONS WERE ADDRESSED AND TREATED DURING THE OFFICE VISIT TODAY:                                                                                            MEDICAL DECISION MAKING             Cardiac Specific Problem / Diagnosis  Discussion and Data Reviewed Diagnostic Procedures Ordered Management Options Selected           1. CAD  Stable   Review and summation of old records:    No chest pain. EKG in the office showing sinus rhythm with a heart rate of 61 bpm. Yes Continue current medications:     Yes           2. PAF  Stable   EKG in the office showing sinus rhythm. Patient is on Coumadin adjusted dose. INR today 2.2. We will continue present medical management. Yes Continue current medications:    Yes           3. Hypertension Stable  blood pressure 134/80. O2 sat 95%. No Continue current medications:       Yes           4. Ischemic cardiomyopathy Stable  no overt signs of failure. No Continue current medications:       Yes     Orders Placed This Encounter   Procedures    POCT INR    EKG 12 lead     Order Specific Question:   Reason for Exam?     Answer:   Irregular heart rate     Comments:   A fib      No orders of the defined types were placed in this encounter. Discussed with patient. Return in about 6 months (around 9/2/2021) for routine with me . I greatly appreciate the opportunity to meet Jania Berrios and your confidence in allowing me to participate in his cardiovascular care. Milton Swann, BEVERLY - NP  3/2/2021 8:41 AM CST                    This dictation was generated by voice recognition computer software. Although all attempts are made to edit dictation for accuracy, there may be errors in the transcription that are not intended.

## 2021-03-08 RX ORDER — LEVOTHYROXINE SODIUM 112 UG/1
TABLET ORAL
Qty: 90 TABLET | Refills: 3 | Status: SHIPPED | OUTPATIENT
Start: 2021-03-08 | End: 2022-03-23

## 2021-04-05 RX ORDER — ALLOPURINOL 300 MG/1
TABLET ORAL
Qty: 45 TABLET | Refills: 3 | Status: SHIPPED | OUTPATIENT
Start: 2021-04-05 | End: 2022-03-28

## 2021-04-15 ENCOUNTER — ANTI-COAG VISIT (OUTPATIENT)
Dept: CARDIOLOGY CLINIC | Age: 66
End: 2021-04-15
Payer: MEDICARE

## 2021-04-15 DIAGNOSIS — I48.0 PAF (PAROXYSMAL ATRIAL FIBRILLATION) (HCC): Primary | ICD-10-CM

## 2021-04-15 LAB
INTERNATIONAL NORMALIZATION RATIO, POC: 2.1
PROTHROMBIN TIME, POC: NORMAL

## 2021-04-15 PROCEDURE — 85610 PROTHROMBIN TIME: CPT | Performed by: NURSE PRACTITIONER

## 2021-05-17 DIAGNOSIS — E11.69 TYPE 2 DIABETES MELLITUS WITH OTHER SPECIFIED COMPLICATION, UNSPECIFIED WHETHER LONG TERM INSULIN USE (HCC): ICD-10-CM

## 2021-05-17 RX ORDER — BLOOD SUGAR DIAGNOSTIC
STRIP MISCELLANEOUS
Qty: 150 EACH | Refills: 11 | Status: SHIPPED | OUTPATIENT
Start: 2021-05-17 | End: 2021-05-18 | Stop reason: SDUPTHER

## 2021-05-17 NOTE — TELEPHONE ENCOUNTER
Mary at [facility] called needing to verify test strips. It's above standard. So they either need additional documentation or see if pt can do 3x per day.

## 2021-05-18 NOTE — TELEPHONE ENCOUNTER
Called pt and he says some days he checks qid, but usually only bid, since it is doing well right now.  Have pended new Rx for bid, to replace prior Rx.

## 2021-05-20 ENCOUNTER — ANTI-COAG VISIT (OUTPATIENT)
Dept: CARDIOLOGY CLINIC | Age: 66
End: 2021-05-20
Payer: MEDICARE

## 2021-05-20 ENCOUNTER — LAB (OUTPATIENT)
Dept: INTERNAL MEDICINE | Facility: CLINIC | Age: 66
End: 2021-05-20

## 2021-05-20 DIAGNOSIS — E03.9 ACQUIRED HYPOTHYROIDISM: ICD-10-CM

## 2021-05-20 DIAGNOSIS — E78.00 HYPERCHOLESTEREMIA: ICD-10-CM

## 2021-05-20 DIAGNOSIS — Z12.5 SCREENING PSA (PROSTATE SPECIFIC ANTIGEN): ICD-10-CM

## 2021-05-20 DIAGNOSIS — I48.0 PAF (PAROXYSMAL ATRIAL FIBRILLATION) (HCC): Primary | ICD-10-CM

## 2021-05-20 DIAGNOSIS — E11.40 DIABETIC NEUROPATHY, PAINFUL (HCC): Primary | ICD-10-CM

## 2021-05-20 DIAGNOSIS — I10 ESSENTIAL HYPERTENSION: ICD-10-CM

## 2021-05-20 LAB
INTERNATIONAL NORMALIZATION RATIO, POC: 1.8
INTERNATIONAL NORMALIZATION RATIO, POC: 1.8
PROTHROMBIN TIME, POC: NORMAL

## 2021-05-20 PROCEDURE — 85610 PROTHROMBIN TIME: CPT | Performed by: CLINICAL NURSE SPECIALIST

## 2021-05-20 RX ORDER — BLOOD SUGAR DIAGNOSTIC
1 STRIP MISCELLANEOUS 2 TIMES DAILY
Qty: 100 EACH | Refills: 5 | Status: SHIPPED | OUTPATIENT
Start: 2021-05-20 | End: 2022-02-07

## 2021-05-21 LAB
ALBUMIN SERPL-MCNC: 4.8 G/DL (ref 3.5–5.2)
ALBUMIN/GLOB SERPL: 2 G/DL
ALP SERPL-CCNC: 83 U/L (ref 39–117)
ALT SERPL-CCNC: 27 U/L (ref 1–41)
APPEARANCE UR: CLEAR
AST SERPL-CCNC: 25 U/L (ref 1–40)
BACTERIA #/AREA URNS HPF: NORMAL /HPF
BASOPHILS # BLD AUTO: 0.08 10*3/MM3 (ref 0–0.2)
BASOPHILS NFR BLD AUTO: 0.9 % (ref 0–1.5)
BILIRUB SERPL-MCNC: 0.6 MG/DL (ref 0–1.2)
BILIRUB UR QL STRIP: NEGATIVE
BUN SERPL-MCNC: 27 MG/DL (ref 8–23)
BUN/CREAT SERPL: 22.5 (ref 7–25)
CALCIUM SERPL-MCNC: 9.6 MG/DL (ref 8.6–10.5)
CASTS URNS MICRO: NORMAL
CHLORIDE SERPL-SCNC: 104 MMOL/L (ref 98–107)
CHOLEST SERPL-MCNC: 143 MG/DL (ref 0–200)
CO2 SERPL-SCNC: 25.1 MMOL/L (ref 22–29)
COLOR UR: YELLOW
CREAT SERPL-MCNC: 1.2 MG/DL (ref 0.76–1.27)
EOSINOPHIL # BLD AUTO: 0.73 10*3/MM3 (ref 0–0.4)
EOSINOPHIL NFR BLD AUTO: 8.2 % (ref 0.3–6.2)
EPI CELLS #/AREA URNS HPF: NORMAL /HPF
ERYTHROCYTE [DISTWIDTH] IN BLOOD BY AUTOMATED COUNT: 12.9 % (ref 12.3–15.4)
GLOBULIN SER CALC-MCNC: 2.4 GM/DL
GLUCOSE SERPL-MCNC: 106 MG/DL (ref 65–99)
GLUCOSE UR QL: ABNORMAL
HBA1C MFR BLD: 5.9 % (ref 4.8–5.6)
HCT VFR BLD AUTO: 50.7 % (ref 37.5–51)
HDLC SERPL-MCNC: 29 MG/DL (ref 40–60)
HGB BLD-MCNC: 17.1 G/DL (ref 13–17.7)
HGB UR QL STRIP: NEGATIVE
IMM GRANULOCYTES # BLD AUTO: 0.03 10*3/MM3 (ref 0–0.05)
IMM GRANULOCYTES NFR BLD AUTO: 0.3 % (ref 0–0.5)
KETONES UR QL STRIP: NEGATIVE
LDLC SERPL CALC-MCNC: 64 MG/DL (ref 0–100)
LEUKOCYTE ESTERASE UR QL STRIP: NEGATIVE
LYMPHOCYTES # BLD AUTO: 2.17 10*3/MM3 (ref 0.7–3.1)
LYMPHOCYTES NFR BLD AUTO: 24.5 % (ref 19.6–45.3)
MCH RBC QN AUTO: 32.8 PG (ref 26.6–33)
MCHC RBC AUTO-ENTMCNC: 33.7 G/DL (ref 31.5–35.7)
MCV RBC AUTO: 97.1 FL (ref 79–97)
MONOCYTES # BLD AUTO: 0.79 10*3/MM3 (ref 0.1–0.9)
MONOCYTES NFR BLD AUTO: 8.9 % (ref 5–12)
NEUTROPHILS # BLD AUTO: 5.05 10*3/MM3 (ref 1.7–7)
NEUTROPHILS NFR BLD AUTO: 57.2 % (ref 42.7–76)
NITRITE UR QL STRIP: NEGATIVE
NRBC BLD AUTO-RTO: 0 /100 WBC (ref 0–0.2)
PH UR STRIP: 6 [PH] (ref 5–8)
PLATELET # BLD AUTO: 213 10*3/MM3 (ref 140–450)
POTASSIUM SERPL-SCNC: 4.4 MMOL/L (ref 3.5–5.2)
PROT SERPL-MCNC: 7.2 G/DL (ref 6–8.5)
PROT UR QL STRIP: NEGATIVE
PSA SERPL-MCNC: 0.44 NG/ML (ref 0–4)
RBC # BLD AUTO: 5.22 10*6/MM3 (ref 4.14–5.8)
RBC #/AREA URNS HPF: NORMAL /HPF
SODIUM SERPL-SCNC: 141 MMOL/L (ref 136–145)
SP GR UR: 1.01 (ref 1–1.03)
TRIGL SERPL-MCNC: 317 MG/DL (ref 0–150)
TSH SERPL DL<=0.005 MIU/L-ACNC: 4.65 UIU/ML (ref 0.27–4.2)
URATE SERPL-MCNC: 4.4 MG/DL (ref 3.4–7)
UROBILINOGEN UR STRIP-MCNC: ABNORMAL MG/DL
VLDLC SERPL CALC-MCNC: 50 MG/DL (ref 5–40)
WBC # BLD AUTO: 8.85 10*3/MM3 (ref 3.4–10.8)
WBC #/AREA URNS HPF: NORMAL /HPF

## 2021-05-25 ENCOUNTER — OFFICE VISIT (OUTPATIENT)
Dept: INTERNAL MEDICINE | Facility: CLINIC | Age: 66
End: 2021-05-25

## 2021-05-25 VITALS
HEIGHT: 74 IN | TEMPERATURE: 97.9 F | OXYGEN SATURATION: 99 % | HEART RATE: 74 BPM | DIASTOLIC BLOOD PRESSURE: 98 MMHG | WEIGHT: 208 LBS | SYSTOLIC BLOOD PRESSURE: 148 MMHG | BODY MASS INDEX: 26.69 KG/M2

## 2021-05-25 DIAGNOSIS — E11.40 DIABETIC NEUROPATHY, PAINFUL (HCC): ICD-10-CM

## 2021-05-25 DIAGNOSIS — I10 ESSENTIAL HYPERTENSION: ICD-10-CM

## 2021-05-25 DIAGNOSIS — E11.42 TYPE 2 DIABETES MELLITUS WITH DIABETIC POLYNEUROPATHY, WITHOUT LONG-TERM CURRENT USE OF INSULIN (HCC): Primary | ICD-10-CM

## 2021-05-25 DIAGNOSIS — E78.00 HYPERCHOLESTEREMIA: ICD-10-CM

## 2021-05-25 PROCEDURE — G0438 PPPS, INITIAL VISIT: HCPCS | Performed by: INTERNAL MEDICINE

## 2021-05-25 RX ORDER — LISINOPRIL 10 MG/1
10 TABLET ORAL DAILY
Qty: 90 TABLET | Refills: 3 | Status: SHIPPED | OUTPATIENT
Start: 2021-05-25 | End: 2022-06-20 | Stop reason: SDUPTHER

## 2021-07-01 ENCOUNTER — ANTI-COAG VISIT (OUTPATIENT)
Dept: CARDIOLOGY CLINIC | Age: 66
End: 2021-07-01
Payer: MEDICARE

## 2021-07-01 DIAGNOSIS — I48.0 PAF (PAROXYSMAL ATRIAL FIBRILLATION) (HCC): Primary | ICD-10-CM

## 2021-07-01 LAB
INTERNATIONAL NORMALIZATION RATIO, POC: 2.3
PROTHROMBIN TIME, POC: NORMAL

## 2021-07-01 PROCEDURE — 85610 PROTHROMBIN TIME: CPT | Performed by: CLINICAL NURSE SPECIALIST

## 2021-07-13 DIAGNOSIS — E11.40 DIABETIC NEUROPATHY, PAINFUL (HCC): ICD-10-CM

## 2021-07-13 RX ORDER — GABAPENTIN 100 MG/1
CAPSULE ORAL
Qty: 90 CAPSULE | Refills: 5 | Status: SHIPPED | OUTPATIENT
Start: 2021-07-13 | End: 2022-01-11 | Stop reason: SDUPTHER

## 2021-08-12 ENCOUNTER — ANTI-COAG VISIT (OUTPATIENT)
Dept: CARDIOLOGY CLINIC | Age: 66
End: 2021-08-12
Payer: MEDICARE

## 2021-08-12 DIAGNOSIS — I48.0 PAF (PAROXYSMAL ATRIAL FIBRILLATION) (HCC): Primary | ICD-10-CM

## 2021-08-12 LAB
INTERNATIONAL NORMALIZATION RATIO, POC: 1.5
PROTHROMBIN TIME, POC: NORMAL

## 2021-08-12 PROCEDURE — 85610 PROTHROMBIN TIME: CPT | Performed by: CLINICAL NURSE SPECIALIST

## 2021-09-07 ENCOUNTER — OFFICE VISIT (OUTPATIENT)
Dept: CARDIOLOGY CLINIC | Age: 66
End: 2021-09-07
Payer: MEDICARE

## 2021-09-07 VITALS
OXYGEN SATURATION: 95 % | HEART RATE: 66 BPM | DIASTOLIC BLOOD PRESSURE: 70 MMHG | BODY MASS INDEX: 27.34 KG/M2 | HEIGHT: 74 IN | SYSTOLIC BLOOD PRESSURE: 122 MMHG | WEIGHT: 213 LBS

## 2021-09-07 DIAGNOSIS — I48.0 PAF (PAROXYSMAL ATRIAL FIBRILLATION) (HCC): Primary | ICD-10-CM

## 2021-09-07 DIAGNOSIS — Z79.01 CHRONIC ANTICOAGULATION: ICD-10-CM

## 2021-09-07 DIAGNOSIS — I25.10 CORONARY ARTERY DISEASE INVOLVING NATIVE CORONARY ARTERY OF NATIVE HEART WITHOUT ANGINA PECTORIS: ICD-10-CM

## 2021-09-07 DIAGNOSIS — I10 ESSENTIAL HYPERTENSION: ICD-10-CM

## 2021-09-07 DIAGNOSIS — E78.5 HYPERLIPIDEMIA, UNSPECIFIED HYPERLIPIDEMIA TYPE: ICD-10-CM

## 2021-09-07 LAB
INTERNATIONAL NORMALIZATION RATIO, POC: 2.2
PROTHROMBIN TIME, POC: NORMAL

## 2021-09-07 PROCEDURE — 1123F ACP DISCUSS/DSCN MKR DOCD: CPT | Performed by: NURSE PRACTITIONER

## 2021-09-07 PROCEDURE — 1036F TOBACCO NON-USER: CPT | Performed by: NURSE PRACTITIONER

## 2021-09-07 PROCEDURE — G8417 CALC BMI ABV UP PARAM F/U: HCPCS | Performed by: NURSE PRACTITIONER

## 2021-09-07 PROCEDURE — 99214 OFFICE O/P EST MOD 30 MIN: CPT | Performed by: NURSE PRACTITIONER

## 2021-09-07 PROCEDURE — 93000 ELECTROCARDIOGRAM COMPLETE: CPT | Performed by: NURSE PRACTITIONER

## 2021-09-07 PROCEDURE — 3017F COLORECTAL CA SCREEN DOC REV: CPT | Performed by: NURSE PRACTITIONER

## 2021-09-07 PROCEDURE — G8427 DOCREV CUR MEDS BY ELIG CLIN: HCPCS | Performed by: NURSE PRACTITIONER

## 2021-09-07 PROCEDURE — 4040F PNEUMOC VAC/ADMIN/RCVD: CPT | Performed by: NURSE PRACTITIONER

## 2021-09-07 PROCEDURE — 85610 PROTHROMBIN TIME: CPT | Performed by: NURSE PRACTITIONER

## 2021-09-07 ASSESSMENT — ENCOUNTER SYMPTOMS
SHORTNESS OF BREATH: 0
COUGH: 0
SORE THROAT: 0
WHEEZING: 0
CHEST TIGHTNESS: 0

## 2021-10-21 ENCOUNTER — ANTI-COAG VISIT (OUTPATIENT)
Dept: CARDIOLOGY CLINIC | Age: 66
End: 2021-10-21
Payer: MEDICARE

## 2021-10-21 DIAGNOSIS — I48.0 PAF (PAROXYSMAL ATRIAL FIBRILLATION) (HCC): Primary | ICD-10-CM

## 2021-10-21 LAB
INTERNATIONAL NORMALIZATION RATIO, POC: 2.1
PROTHROMBIN TIME, POC: NORMAL

## 2021-10-21 PROCEDURE — 85610 PROTHROMBIN TIME: CPT | Performed by: NURSE PRACTITIONER

## 2021-11-10 DIAGNOSIS — G25.81 RESTLESS LEG SYNDROME: ICD-10-CM

## 2021-11-10 RX ORDER — BACLOFEN 10 MG/1
TABLET ORAL
Qty: 90 TABLET | Refills: 3 | Status: SHIPPED | OUTPATIENT
Start: 2021-11-10 | End: 2022-07-12 | Stop reason: SDUPTHER

## 2021-11-29 ENCOUNTER — OFFICE VISIT (OUTPATIENT)
Dept: INTERNAL MEDICINE | Facility: CLINIC | Age: 66
End: 2021-11-29

## 2021-11-29 VITALS
DIASTOLIC BLOOD PRESSURE: 84 MMHG | OXYGEN SATURATION: 99 % | HEIGHT: 74 IN | HEART RATE: 72 BPM | SYSTOLIC BLOOD PRESSURE: 124 MMHG | BODY MASS INDEX: 27.59 KG/M2 | WEIGHT: 215 LBS | TEMPERATURE: 96.9 F

## 2021-11-29 DIAGNOSIS — M20.42 HAMMER TOES OF BOTH FEET: ICD-10-CM

## 2021-11-29 DIAGNOSIS — M20.41 HAMMER TOES OF BOTH FEET: ICD-10-CM

## 2021-11-29 DIAGNOSIS — L84 CALLUS OF HEEL: ICD-10-CM

## 2021-11-29 DIAGNOSIS — I10 ESSENTIAL HYPERTENSION: ICD-10-CM

## 2021-11-29 DIAGNOSIS — E11.42 TYPE 2 DIABETES MELLITUS WITH DIABETIC POLYNEUROPATHY, WITHOUT LONG-TERM CURRENT USE OF INSULIN (HCC): Primary | ICD-10-CM

## 2021-11-29 LAB — HBA1C MFR BLD: 5.8 %

## 2021-11-29 PROCEDURE — 99214 OFFICE O/P EST MOD 30 MIN: CPT | Performed by: INTERNAL MEDICINE

## 2021-11-29 PROCEDURE — 3044F HG A1C LEVEL LT 7.0%: CPT | Performed by: INTERNAL MEDICINE

## 2021-11-29 PROCEDURE — 83036 HEMOGLOBIN GLYCOSYLATED A1C: CPT | Performed by: INTERNAL MEDICINE

## 2021-11-29 NOTE — PROGRESS NOTES
Subjective   Jn Cárdenas is a 66 y.o. male.   Chief Complaint   Patient presents with   • Hypertension     6 month follow up   • Diabetes     A1C - 5.8       History of Present Illness patient is here for 6-month follow-up of his blood pressure and diabetes his blood pressure is good at 124/84 A1c is 5.8.  He is taking all medication as prescribed.  He does take Celebrex on a regular basis which is likely contributing to some elevation of blood pressure.    The following portions of the patient's history were reviewed and updated as appropriate: allergies, current medications, past family history, past medical history, past social history, past surgical history and problem list.    Review of Systems   Constitutional: Negative for activity change, appetite change, fatigue, fever, unexpected weight gain and unexpected weight loss.   HENT: Negative for swollen glands, trouble swallowing and voice change.    Eyes: Negative for blurred vision and visual disturbance.   Respiratory: Negative for cough and shortness of breath.    Cardiovascular: Negative for chest pain, palpitations and leg swelling.   Gastrointestinal: Negative for abdominal pain, constipation, diarrhea, nausea, vomiting and indigestion.   Endocrine: Negative for cold intolerance, heat intolerance, polydipsia and polyphagia.   Genitourinary: Negative for dysuria and frequency.   Musculoskeletal: Negative for arthralgias, back pain, joint swelling and neck pain.   Skin: Negative for color change, rash and skin lesions.   Neurological: Negative for dizziness, weakness, headache, memory problem and confusion.   Hematological: Does not bruise/bleed easily.   Psychiatric/Behavioral: Negative for agitation, hallucinations and suicidal ideas. The patient is not nervous/anxious.        Objective   Past Medical History:   Diagnosis Date   • Arthritis    • Cataract    • Coronary artery disease    • Diabetes mellitus (HCC)    • Disease of thyroid gland    • Gout    •  Hypertension    • Myocardial infarct, old     24 yrs ago   • Neuropathy     in feet and legs   • Parotid tumor    • Spastic colon    • TIA (transient ischemic attack)     weakness on left upper extremity      Past Surgical History:   Procedure Laterality Date   • BACK SURGERY      had spinal fusion   • OTHER SURGICAL HISTORY      had goiter removed  at 4 yrs old   • OTHER SURGICAL HISTORY Right     had broken arm,   • PAROTIDECTOMY Left 3/21/2017    Procedure: PAROTID TUMOR EXCISION WITH FACIAL NERVE MONITORING,  DISSECTION LEFT;  Surgeon: Armond Leon MD;  Location: [facility];  Service:         Current Outpatient Medications:   •  allopurinol (ZYLOPRIM) 300 MG tablet, TAKE 1/2 (ONE-HALF) TABLET BY MOUTH ONCE DAILY IN THE EVENING, Disp: 45 tablet, Rfl: 3  •  baclofen (LIORESAL) 10 MG tablet, TAKE ONE TABLET BY MOUTH EVERY NIGHT. MAY TAKE UP TO 3 TABLETS IF NEEDED NIGHTLY., Disp: 90 tablet, Rfl: 3  •  celecoxib (CeleBREX) 100 MG capsule, Take 1 capsule by mouth once daily, Disp: 90 capsule, Rfl: 3  •  Chromium (Cr-GTF) 200 MCG capsule, Take 1 capsule by mouth 2 (Two) Times a Day., Disp: , Rfl:   •  CloNIDine (CATAPRES) 0.1 MG tablet, Take 0.1 mg by mouth Daily., Disp: , Rfl:   •  Dapagliflozin Propanediol (Farxiga) 10 MG tablet, Take 1 tablet by mouth Daily., Disp: , Rfl:   •  Diphenhydramine-PSE-APAP (ALLERGY SINUS HEADACHE RELIEF PO), Take 1 tablet by mouth Every Night. May take 1-2 daily for sinus headache, Disp: , Rfl:   •  EQL CINNAMON PO, Take 1,000 mg by mouth Daily., Disp: , Rfl:   •  fluticasone (VERAMYST) 27.5 MCG/SPRAY nasal spray, 2 sprays into each nostril Daily., Disp: , Rfl:   •  gabapentin (NEURONTIN) 100 MG capsule, TAKE 1 TO 3 CAPSULES BY MOUTH EVERY NIGHT AT BEDTIME AS NEEDED, Disp: 90 capsule, Rfl: 5  •  glucose blood (OneTouch Ultra) test strip, 1 each by Other route 2 (two) times a day. Use as instructed, Disp: 100 each, Rfl: 5  •  levothyroxine (SYNTHROID, LEVOTHROID) 112 MCG tablet,  Take 1 tablet by mouth once daily, Disp: 90 tablet, Rfl: 3  •  Liraglutide (VICTOZA) 18 MG/3ML solution pen-injector, Inject 1.8 pens under the skin Every Night., Disp: , Rfl:   •  lisinopril (PRINIVIL,ZESTRIL) 10 MG tablet, Take 1 tablet by mouth Daily., Disp: 90 tablet, Rfl: 3  •  Multiple Vitamins-Minerals (ALIVE MENS ENERGY) tablet, Take 1 tablet by mouth Daily., Disp: , Rfl:   •  NON FORMULARY, 1 application As Needed (is over the counter rollon applies to  legs prn  max freeze)., Disp: , Rfl:   •  NON FORMULARY, Take 2 tablets by mouth Daily. Healthy feet and nerves, Disp: , Rfl:   •  omeprazole (priLOSEC) 40 MG capsule, Take 40 mg by mouth Daily., Disp: , Rfl:   •  warfarin (COUMADIN) 5 MG tablet, Take 5 mg by mouth Daily. Take 7.5mg 3 days weekly, and 5mg 4 days weekly, Disp: , Rfl:       Vitals:    11/29/21 0847   BP: 124/84   Pulse:    Temp:    SpO2:          11/29/21  0831   Weight: 97.5 kg (215 lb)       Body mass index is 27.6 kg/m².    Physical Exam  Vitals and nursing note reviewed.   Constitutional:       General: He is not in acute distress.     Appearance: Normal appearance. He is well-developed.   HENT:      Head: Normocephalic and atraumatic.      Right Ear: External ear normal.      Left Ear: External ear normal.      Nose: Nose normal.   Eyes:      Extraocular Movements: Extraocular movements intact.      Conjunctiva/sclera: Conjunctivae normal.      Pupils: Pupils are equal, round, and reactive to light.   Cardiovascular:      Rate and Rhythm: Normal rate and regular rhythm.      Pulses: Normal pulses.      Heart sounds: Normal heart sounds.   Pulmonary:      Effort: Pulmonary effort is normal.      Breath sounds: Normal breath sounds.   Abdominal:      General: Bowel sounds are normal.      Palpations: Abdomen is soft.   Musculoskeletal:         General: Normal range of motion.      Cervical back: Normal range of motion and neck supple. No rigidity. No muscular tenderness.      Right foot:  Deformity ( Hammertoe) present.      Left foot: Deformity present.        Feet:    Feet:      Right foot:      Skin integrity: Callus present.      Left foot:      Skin integrity: Callus present.   Skin:     General: Skin is warm and dry.   Neurological:      General: No focal deficit present.      Mental Status: He is alert and oriented to person, place, and time.   Psychiatric:         Mood and Affect: Mood normal.         Behavior: Behavior normal.               Assessment/Plan   Diagnoses and all orders for this visit:    1. Type 2 diabetes mellitus with diabetic polyneuropathy, without long-term current use of insulin (HCC) (Primary)    2. Essential hypertension    3. Hammer toes of both feet    4. Callus of heel      At today's visit patient's diabetes is under very good control as is his blood pressure no change in medications are made.  He does have chronic changes in both feet associated with hammertoes and calluses.  Encourage patient to continue diabetic regimen diet his medications we will see him back in 6 months.

## 2021-12-02 ENCOUNTER — ANTI-COAG VISIT (OUTPATIENT)
Dept: CARDIOLOGY CLINIC | Age: 66
End: 2021-12-02
Payer: MEDICARE

## 2021-12-02 DIAGNOSIS — I48.0 PAF (PAROXYSMAL ATRIAL FIBRILLATION) (HCC): Primary | ICD-10-CM

## 2021-12-02 LAB
INTERNATIONAL NORMALIZATION RATIO, POC: 2.3
PROTHROMBIN TIME, POC: NORMAL

## 2021-12-02 PROCEDURE — 85610 PROTHROMBIN TIME: CPT | Performed by: NURSE PRACTITIONER

## 2022-01-11 DIAGNOSIS — E11.40 DIABETIC NEUROPATHY, PAINFUL: ICD-10-CM

## 2022-01-11 RX ORDER — GABAPENTIN 100 MG/1
100-300 CAPSULE ORAL NIGHTLY PRN
Qty: 90 CAPSULE | Refills: 5 | Status: SHIPPED | OUTPATIENT
Start: 2022-01-11 | End: 2022-04-14 | Stop reason: SDUPTHER

## 2022-01-13 ENCOUNTER — ANTI-COAG VISIT (OUTPATIENT)
Dept: CARDIOLOGY CLINIC | Age: 67
End: 2022-01-13
Payer: MEDICARE

## 2022-01-13 DIAGNOSIS — I48.0 PAF (PAROXYSMAL ATRIAL FIBRILLATION) (HCC): Primary | ICD-10-CM

## 2022-01-13 LAB
INTERNATIONAL NORMALIZATION RATIO, POC: 2.7
PROTHROMBIN TIME, POC: NORMAL

## 2022-01-13 PROCEDURE — 85610 PROTHROMBIN TIME: CPT | Performed by: CLINICAL NURSE SPECIALIST

## 2022-02-05 DIAGNOSIS — E11.69 TYPE 2 DIABETES MELLITUS WITH OTHER SPECIFIED COMPLICATION, UNSPECIFIED WHETHER LONG TERM INSULIN USE: ICD-10-CM

## 2022-02-07 RX ORDER — BLOOD SUGAR DIAGNOSTIC
STRIP MISCELLANEOUS
Qty: 100 EACH | Refills: 3 | Status: SHIPPED | OUTPATIENT
Start: 2022-02-07 | End: 2022-08-17 | Stop reason: SDUPTHER

## 2022-02-22 RX ORDER — WARFARIN SODIUM 5 MG/1
TABLET ORAL
Qty: 180 TABLET | Refills: 0 | Status: SHIPPED | OUTPATIENT
Start: 2022-02-22 | End: 2022-05-31

## 2022-02-28 ENCOUNTER — ANTI-COAG VISIT (OUTPATIENT)
Dept: CARDIOLOGY CLINIC | Age: 67
End: 2022-02-28
Payer: MEDICARE

## 2022-02-28 DIAGNOSIS — I48.0 PAF (PAROXYSMAL ATRIAL FIBRILLATION) (HCC): Primary | ICD-10-CM

## 2022-02-28 LAB
INTERNATIONAL NORMALIZATION RATIO, POC: 2.6
PROTHROMBIN TIME, POC: NORMAL

## 2022-02-28 PROCEDURE — 85610 PROTHROMBIN TIME: CPT | Performed by: NURSE PRACTITIONER

## 2022-03-23 RX ORDER — LEVOTHYROXINE SODIUM 112 UG/1
TABLET ORAL
Qty: 90 TABLET | Refills: 0 | Status: SHIPPED | OUTPATIENT
Start: 2022-03-23 | End: 2022-06-27

## 2022-03-28 RX ORDER — ALLOPURINOL 300 MG/1
TABLET ORAL
Qty: 45 TABLET | Refills: 3 | Status: SHIPPED | OUTPATIENT
Start: 2022-03-28 | End: 2023-03-16 | Stop reason: SDUPTHER

## 2022-03-29 ENCOUNTER — OFFICE VISIT (OUTPATIENT)
Dept: CARDIOLOGY CLINIC | Age: 67
End: 2022-03-29
Payer: MEDICARE

## 2022-03-29 VITALS
DIASTOLIC BLOOD PRESSURE: 88 MMHG | HEIGHT: 74 IN | WEIGHT: 217 LBS | SYSTOLIC BLOOD PRESSURE: 138 MMHG | BODY MASS INDEX: 27.85 KG/M2 | OXYGEN SATURATION: 94 % | HEART RATE: 74 BPM

## 2022-03-29 DIAGNOSIS — I48.0 PAF (PAROXYSMAL ATRIAL FIBRILLATION) (HCC): Primary | ICD-10-CM

## 2022-03-29 DIAGNOSIS — I10 ESSENTIAL HYPERTENSION: ICD-10-CM

## 2022-03-29 DIAGNOSIS — I25.10 CORONARY ARTERY DISEASE INVOLVING NATIVE CORONARY ARTERY OF NATIVE HEART WITHOUT ANGINA PECTORIS: ICD-10-CM

## 2022-03-29 DIAGNOSIS — E78.5 DYSLIPIDEMIA: ICD-10-CM

## 2022-03-29 LAB
INTERNATIONAL NORMALIZATION RATIO, POC: 1.4
PROTHROMBIN TIME, POC: 16.7

## 2022-03-29 PROCEDURE — 99214 OFFICE O/P EST MOD 30 MIN: CPT | Performed by: NURSE PRACTITIONER

## 2022-03-29 PROCEDURE — 1123F ACP DISCUSS/DSCN MKR DOCD: CPT | Performed by: NURSE PRACTITIONER

## 2022-03-29 PROCEDURE — G8417 CALC BMI ABV UP PARAM F/U: HCPCS | Performed by: NURSE PRACTITIONER

## 2022-03-29 PROCEDURE — G8484 FLU IMMUNIZE NO ADMIN: HCPCS | Performed by: NURSE PRACTITIONER

## 2022-03-29 PROCEDURE — G8427 DOCREV CUR MEDS BY ELIG CLIN: HCPCS | Performed by: NURSE PRACTITIONER

## 2022-03-29 PROCEDURE — 3017F COLORECTAL CA SCREEN DOC REV: CPT | Performed by: NURSE PRACTITIONER

## 2022-03-29 PROCEDURE — 1036F TOBACCO NON-USER: CPT | Performed by: NURSE PRACTITIONER

## 2022-03-29 PROCEDURE — 93000 ELECTROCARDIOGRAM COMPLETE: CPT | Performed by: NURSE PRACTITIONER

## 2022-03-29 PROCEDURE — 4040F PNEUMOC VAC/ADMIN/RCVD: CPT | Performed by: NURSE PRACTITIONER

## 2022-03-29 PROCEDURE — 85610 PROTHROMBIN TIME: CPT | Performed by: NURSE PRACTITIONER

## 2022-03-29 ASSESSMENT — ENCOUNTER SYMPTOMS
SHORTNESS OF BREATH: 0
WHEEZING: 0
SORE THROAT: 0
CHEST TIGHTNESS: 0
COUGH: 0

## 2022-03-29 NOTE — PROGRESS NOTES
[facility] Cardiology   Established Patient Office Visit   [facility]. [address], [place]  [phone]        OFFICE VISIT:  3/29/2022    Reikiley Sanchezgiulia Morton - : 1955    Reason For Visit:  Christiano Denney is a 77 y.o. male who is here for 6 Month Follow-Up    1. PAF (paroxysmal atrial fibrillation) ([address]    2. Essential hypertension    3. Dyslipidemia    4.  Coronary artery disease involving native coronary artery of native heart without angina pectoris      Patient with a history of coronary artery disease, ischemic cardiomyopathy, hypertension, hyperlipidemia, and paroxysmal atrial fib.     Patient presents to clinic today for 6-month follow-up. Diana Dove denies any complaints of chest pain, pressure or tightness.  There is no shortness of breath, orthopnea or PND.  Patient denies any lightheadedness, dizziness or syncope.         DATA:     1998  SE  negative for myocardial ischemia  2001  Cath  NCA with anterior/apical akinesis  2002  Echo  EF  45%  2003  cardiolite  negative for myocardial ischemia, EF 58%  2004  SE  negative for myocardial ischemia  2008  Echo  Normal LVFX, diastolic dysfunction\    SE  negative for myocardial ischemia  02/15/2012  SE  negative for myocardial ischemia [facility])  16 Echo mild-mod MR, mild AR, EF 49%            Subjective    Aniceto Davidson is a 77 y.o. male with the following history as recorded in [facility]:    Patient Active Problem List    Diagnosis Date Noted    Syncope 2012    Cardiomyopathy ([address] 2011    Chest pain 2019    Chronic anticoagulation 2017    PAF (paroxysmal atrial fibrillation) ([address] 2015    MI (myocardial infarction) ([address]     CAD (coronary artery disease)     Gout 2012    Diabetes mellitus ([address]     Cerebral artery occlusion with cerebral infarction ([address]     Hyperlipidemia     Hypertension      Current Outpatient Medications   Medication Sig Dispense Refill    warfarin (COUMADIN) 5 MG tablet TAKE 1 TO 2 TABLETS BY MOUTH ONCE DAILY 180 tablet 0    Chlorphen-Pseudoephed-APAP (CORICIDIN D PO) Take by mouth as needed      omeprazole (PRILOSEC) 40 MG delayed release capsule Take 40 mg by mouth daily  2    Chromium (GTF CHROMIUM) 200 MCG TABS Take 200 mcg by mouth daily      vitamin B-12 (CYANOCOBALAMIN) 1000 MCG tablet Take 1,000 mcg by mouth 2 times daily      gabapentin (NEURONTIN) 100 MG capsule Take 100 mg by mouth 3 times daily as needed. Stevinson Slim cloNIDine (CATAPRES) 0.1 MG tablet Take 1 tablet by mouth daily 90 tablet 3    VICTOZA 18 MG/3ML SOPN SC injection Inject 1.8 mg into the skin daily       fluticasone (FLONASE) 50 MCG/ACT nasal spray 2 sprays by Nasal route daily       allopurinol (ZYLOPRIM) 300 MG tablet Take 0.5 tablet daily      baclofen (LIORESAL) 10 MG tablet Take 10 mg by mouth 2 times daily       levothyroxine (SYNTHROID) 112 MCG tablet Take 112 mcg by mouth daily        No current facility-administered medications for this visit. Allergies: Patient has no known allergies. Past Medical History:   Diagnosis Date    CAD (coronary artery disease)     Cardiomyopathy ([facility] 6/23/2011    Diabetes mellitus ([facility]     Type 2    Gout 2/8/2012    Hypercholesteremia     Hyperlipidemia     Hypertension     MI (myocardial infarction) ([facility]     Other chest pain     history of conversion reaction.  Other primary cardiomyopathies     Syncope 2/8/2012    history of near syncopal episodes.  Unspecified cerebral artery occlusion with cerebral infarction     history of stroke. Past Surgical History:   Procedure Laterality Date    BACK SURGERY  1974    spinal fusion-lumbar    CARDIAC CATHETERIZATION  02/21/06  [facility]    Left heart cath    CARDIAC CATHETERIZATION  02/21/2001    EF is 75%.   See scanned document.-Dr ABIGAIL PALMER [facility] CARDIAC SURGERY  05/26/2006    Loop recorder placed-Dr Keyon Loyola per patient    OTHER SURGICAL HISTORY      Goiter in throat removed as a child     Family History   Problem Relation Age of Onset    Heart Disease Mother     High Blood Pressure Mother     Heart Disease Father     High Blood Pressure Father     Prostate Cancer Father     Heart Disease Brother     Colon Polyps Maternal Grandmother     Ulcerative Colitis Maternal Grandmother     Colon Cancer Neg Hx     Esophageal Cancer Neg Hx     Lung Cancer Neg Hx     Liver Disease Neg Hx     Rectal Cancer Neg Hx     Stomach Cancer Neg Hx      Social History     Tobacco Use    Smoking status: Never Smoker    Smokeless tobacco: Never Used   Substance Use Topics    Alcohol use: No          Review of Systems:    Review of Systems   Constitutional: Negative for activity change, chills, diaphoresis, fatigue and fever. HENT: Negative for congestion and sore throat. Respiratory: Negative for cough, chest tightness, shortness of breath and wheezing. Cardiovascular: Negative for chest pain, palpitations and leg swelling. Neurological: Negative for dizziness, syncope, light-headedness and headaches. Psychiatric/Behavioral: Negative for confusion. The patient is not nervous/anxious. Objective:    /88   Pulse 74   Ht 6' 2\" (1.88 m)   Wt 217 lb (98.4 kg)   SpO2 94%   BMI 27.86 kg/m²     GENERAL - well developed and well nourished, conversant  HEENT   PERRLA, Hearing appears normal, gentleman lids are normal.  External inspection of ears and nose appear normal.  NECK - no thyromegaly, no JVD, trachea is in the midline  CARDIOVASCULAR  PMI is in the mid line clavicular position, Normal S1 and S2 with no systolic murmur. No S3 or S4    PULMONARY  no respiratory distress. No wheezes or rales. Lungs are clear to ausculation, normal respiratory effort.   ABDOMEN   soft, non tender, no rebound  MUSCULOSKELETAL   range of motion of the upper and lower extermites appears normal and equal and is without pain   EXTREMITIES - no significant edema   NEUROLOGIC  gait and station are normal  SKIN - turgor is normal, can warm and dry. PSYCHIATRIC - normal mood and affect, alert and orientated x 3,      ASSESSMENT:    ALL THE CARDIOLOGY PROBLEMS ARE LISTED ABOVE; HOWEVER, THE FOLLOWING SPECIFIC CARDIAC PROBLEMS / CONDITIONS WERE ADDRESSED AND TREATED DURING THE OFFICE VISIT TODAY:                                                                                            MEDICAL DECISION MAKING             Cardiac Specific Problem / Diagnosis  Discussion and Data Reviewed Diagnostic Procedures Ordered Management Options Selected           1. PAF  Stable   Review and summation of old records:    EKG in the office today showing sinus rhythm with a heart rate of 78 bpm.  INR today 1.4. Coumadin adjusted. Recheck. Yes Continue current medications:     Yes           2. CAD  Stable   No chest pain. EKG without acute changes. Yes Continue current medications:    Yes           3. Hypertension Stable  blood pressure 138/88. Patient is on clonidine 0.1 daily. No Continue current medications: Yes                     Orders Placed This Encounter   Procedures    POCT INR    EKG 12 lead     Order Specific Question:   Reason for Exam?     Answer:   Irregular heart rate     No orders of the defined types were placed in this encounter. Discussed with patient. Return in about 6 months (around 9/29/2022) for Routine with me . I greatly appreciate the opportunity to meet Joy Guy and your confidence in allowing me to participate in his cardiovascular care. BEVERLY Gao NP  3/29/2022 8:26 AM CDT                    This dictation was generated by voice recognition computer software. Although all attempts are made to edit dictation for accuracy, there may be errors in the transcription that are not intended.

## 2022-04-14 DIAGNOSIS — E11.40 DIABETIC NEUROPATHY, PAINFUL: ICD-10-CM

## 2022-04-14 RX ORDER — GABAPENTIN 100 MG/1
100-300 CAPSULE ORAL NIGHTLY PRN
Qty: 90 CAPSULE | Refills: 1 | Status: SHIPPED | OUTPATIENT
Start: 2022-04-14 | End: 2022-10-06

## 2022-04-14 NOTE — TELEPHONE ENCOUNTER
CLARKE at [facility], as they had faxed us a request to refill his Gabapentin.  We show that this was filled 1/11/22 for #90 x 5, so should not need refills at this time.  I left this information on the VM at [facility] and to call back if there were other issues that we needed to know about.

## 2022-04-14 NOTE — TELEPHONE ENCOUNTER
Rosa from [facility] called back, stating since Dr. Herrera is no longer practicing, they need a new Gabapentin Rx from Dr. Mcneil and cannot honor the existing refills on the prior Rx.  I have pended/attached Rx.  He has appt with Dr. Mcneil 5/26/22.

## 2022-04-29 ENCOUNTER — ANTI-COAG VISIT (OUTPATIENT)
Dept: CARDIOLOGY CLINIC | Age: 67
End: 2022-04-29
Payer: MEDICARE

## 2022-04-29 DIAGNOSIS — I48.0 PAF (PAROXYSMAL ATRIAL FIBRILLATION) (HCC): Primary | ICD-10-CM

## 2022-04-29 LAB
INTERNATIONAL NORMALIZATION RATIO, POC: 3.7
PROTHROMBIN TIME, POC: NORMAL

## 2022-04-29 PROCEDURE — 93793 ANTICOAG MGMT PT WARFARIN: CPT | Performed by: NURSE PRACTITIONER

## 2022-04-29 PROCEDURE — 85610 PROTHROMBIN TIME: CPT | Performed by: NURSE PRACTITIONER

## 2022-05-27 ENCOUNTER — ANTI-COAG VISIT (OUTPATIENT)
Dept: CARDIOLOGY CLINIC | Age: 67
End: 2022-05-27
Payer: MEDICARE

## 2022-05-27 DIAGNOSIS — I48.0 PAF (PAROXYSMAL ATRIAL FIBRILLATION) (HCC): Primary | ICD-10-CM

## 2022-05-27 LAB
INTERNATIONAL NORMALIZATION RATIO, POC: 2.1
PROTHROMBIN TIME, POC: 24.8

## 2022-05-27 PROCEDURE — 85610 PROTHROMBIN TIME: CPT | Performed by: INTERNAL MEDICINE

## 2022-05-27 PROCEDURE — 93793 ANTICOAG MGMT PT WARFARIN: CPT | Performed by: INTERNAL MEDICINE

## 2022-05-27 NOTE — PROGRESS NOTES
Pt's INR is 2.1, which is in range. Pt will continue his normal dose and recheck in  4 weeks. Pt voiced his understanding.

## 2022-05-31 RX ORDER — WARFARIN SODIUM 5 MG/1
TABLET ORAL
Qty: 180 TABLET | Refills: 3 | Status: SHIPPED | OUTPATIENT
Start: 2022-05-31

## 2022-06-20 RX ORDER — LISINOPRIL 10 MG/1
10 TABLET ORAL DAILY
Qty: 90 TABLET | Refills: 0 | Status: SHIPPED | OUTPATIENT
Start: 2022-06-20 | End: 2022-10-31

## 2022-06-24 ENCOUNTER — ANTI-COAG VISIT (OUTPATIENT)
Dept: CARDIOLOGY CLINIC | Age: 67
End: 2022-06-24
Payer: MEDICARE

## 2022-06-24 DIAGNOSIS — I48.0 PAF (PAROXYSMAL ATRIAL FIBRILLATION) (HCC): Primary | ICD-10-CM

## 2022-06-24 LAB
INTERNATIONAL NORMALIZATION RATIO, POC: 1.3
PROTHROMBIN TIME, POC: 16.4

## 2022-06-24 PROCEDURE — 85610 PROTHROMBIN TIME: CPT | Performed by: NURSE PRACTITIONER

## 2022-06-27 RX ORDER — LEVOTHYROXINE SODIUM 112 UG/1
TABLET ORAL
Qty: 90 TABLET | Refills: 3 | Status: SHIPPED | OUTPATIENT
Start: 2022-06-27

## 2022-07-01 ENCOUNTER — OFFICE VISIT (OUTPATIENT)
Dept: INTERNAL MEDICINE | Facility: CLINIC | Age: 67
End: 2022-07-01

## 2022-07-01 VITALS
DIASTOLIC BLOOD PRESSURE: 82 MMHG | BODY MASS INDEX: 27.72 KG/M2 | OXYGEN SATURATION: 98 % | SYSTOLIC BLOOD PRESSURE: 130 MMHG | WEIGHT: 216 LBS | HEIGHT: 74 IN | HEART RATE: 92 BPM | TEMPERATURE: 97.8 F

## 2022-07-01 DIAGNOSIS — J44.9 CHRONIC BRONCHITIS, OBSTRUCTIVE: ICD-10-CM

## 2022-07-01 DIAGNOSIS — J30.1 SEASONAL ALLERGIC RHINITIS DUE TO POLLEN: ICD-10-CM

## 2022-07-01 DIAGNOSIS — I10 ESSENTIAL HYPERTENSION: ICD-10-CM

## 2022-07-01 DIAGNOSIS — Z79.899 ENCOUNTER FOR LONG-TERM CURRENT USE OF MEDICATION: ICD-10-CM

## 2022-07-01 DIAGNOSIS — Z00.00 MEDICARE ANNUAL WELLNESS VISIT, SUBSEQUENT: Primary | ICD-10-CM

## 2022-07-01 DIAGNOSIS — Z11.59 NEED FOR HEPATITIS C SCREENING TEST: ICD-10-CM

## 2022-07-01 DIAGNOSIS — Z12.5 SCREENING PSA (PROSTATE SPECIFIC ANTIGEN): ICD-10-CM

## 2022-07-01 DIAGNOSIS — E78.2 MIXED HYPERLIPIDEMIA: ICD-10-CM

## 2022-07-01 DIAGNOSIS — I48.0 PAF (PAROXYSMAL ATRIAL FIBRILLATION): ICD-10-CM

## 2022-07-01 DIAGNOSIS — E03.9 ACQUIRED HYPOTHYROIDISM: ICD-10-CM

## 2022-07-01 DIAGNOSIS — I42.9 CARDIOMYOPATHY, UNSPECIFIED TYPE: ICD-10-CM

## 2022-07-01 DIAGNOSIS — E11.42 TYPE 2 DIABETES MELLITUS WITH DIABETIC POLYNEUROPATHY, WITHOUT LONG-TERM CURRENT USE OF INSULIN: ICD-10-CM

## 2022-07-01 PROCEDURE — G0439 PPPS, SUBSEQ VISIT: HCPCS

## 2022-07-01 PROCEDURE — 1170F FXNL STATUS ASSESSED: CPT

## 2022-07-01 PROCEDURE — 1159F MED LIST DOCD IN RCRD: CPT

## 2022-07-01 NOTE — PROGRESS NOTES
The ABCs of the Annual Wellness Visit  Subsequent Medicare Wellness Visit    Chief Complaint   Patient presents with   • Medicare Wellness-subsequent     A1c: 5.8      Subjective    History of Present Illness:  Jn Cárdenas is a 66 y.o. male who presents for a Subsequent Medicare Wellness Visit.  He is under current medical management for hypertension, hyperlipidemia, cardiomyopathy, chronic bronchitis, paroxysmal atrial fibrillation, type 2 diabetes, seasonal allergies.  He reports that blood pressures have been running normal at home, has had no issues with headaches chest pain shortness of breath activity intolerance or dizziness or hypotension.  He reports he has been taking medications as ordered and has not been dealing with any adverse side effects.   He denies any issues with diarrhea, constipation, acid reflux, nausea, no blood in stool, no black tarry stools.  He denies any issues with urinating, no increased frequency dysuria or pelvic pain.  He reports allergies are currently well controlled with medication.  He has noted no signs or symptoms of bleeding, Coumadin is managed by his cardiologist.  He reports that his blood sugars have been running on average  in the mornings, he does have his diabetes managed by an endocrinologist at [place] Dr. Castillo.  He denies any increase in numbness or tingling in his feet, does have some neuropathy baseline.  Reports that he has annual diabetic eye exams and sees dentist routinely.  He denies any acute concerns or complaints today.  The following portions of the patient's history were reviewed and   updated as appropriate: allergies, current medications, past family history, past medical history, past social history, past surgical history and problem list.    Compared to one year ago, the patient feels his physical   health is better.    Compared to one year ago, the patient feels his mental   health is the same.    Recent Hospitalizations:  He was not  admitted to the hospital during the last year.       Current Medical Providers:  Patient Care Team:  Agata Mcneil DO as PCP - General (Family Medicine)  Armond Leon MD as Consulting Physician (Otolaryngology)    Outpatient Medications Prior to Visit   Medication Sig Dispense Refill   • allopurinol (ZYLOPRIM) 300 MG tablet TAKE 1/2 (ONE-HALF) TABLET BY MOUTH ONCE DAILY IN THE EVENING 45 tablet 3   • baclofen (LIORESAL) 10 MG tablet TAKE ONE TABLET BY MOUTH EVERY NIGHT. MAY TAKE UP TO 3 TABLETS IF NEEDED NIGHTLY. 90 tablet 3   • celecoxib (CeleBREX) 100 MG capsule Take 1 capsule by mouth once daily 90 capsule 3   • Chromium 200 MCG capsule Take 1 capsule by mouth 2 (Two) Times a Day.     • CloNIDine (CATAPRES) 0.1 MG tablet Take 0.1 mg by mouth Daily.     • dapagliflozin Propanediol 10 MG tablet Take 1 tablet by mouth Daily.     • Diphenhydramine-PSE-APAP (ALLERGY SINUS HEADACHE RELIEF PO) Take 1 tablet by mouth Every Night. May take 1-2 daily for sinus headache     • EQL CINNAMON PO Take 1,000 mg by mouth Daily.     • fluticasone (VERAMYST) 27.5 MCG/SPRAY nasal spray 2 sprays into each nostril Daily.     • gabapentin (NEURONTIN) 100 MG capsule Take 1-3 capsules by mouth At Night As Needed (neuropathy). 90 capsule 1   • levothyroxine (SYNTHROID, LEVOTHROID) 112 MCG tablet Take 1 tablet by mouth once daily 90 tablet 3   • Liraglutide (VICTOZA) 18 MG/3ML solution pen-injector injection Inject 1.8 pens under the skin Every Night.     • lisinopril (PRINIVIL,ZESTRIL) 10 MG tablet Take 1 tablet by mouth Daily. 90 tablet 0   • Multiple Vitamins-Minerals (ALIVE MENS ENERGY) tablet Take 1 tablet by mouth Daily.     • NON FORMULARY 1 application As Needed (is over the counter rollon applies to  legs prn  max freeze).     • NON FORMULARY Take 2 tablets by mouth Daily. Healthy feet and nerves     • omeprazole (priLOSEC) 40 MG capsule Take 40 mg by mouth Daily.     • OneTouch Ultra test strip USE 1 STRIP TO CHECK  "GLUCOSE TWICE DAILY USE  AS  INSTRUCTED 100 each 3   • warfarin (COUMADIN) 5 MG tablet Take 5 mg by mouth Daily. Take 7.5mg 3 days weekly, and  10 mg 4 days weekly       No facility-administered medications prior to visit.       No opioid medication identified on active medication list. I have reviewed chart for other potential  high risk medication/s and harmful drug interactions in the elderly.          Aspirin is not on active medication list.  Aspirin use is not indicated based on review of current medical condition/s. Risk of harm outweighs potential benefits.  .    Patient Active Problem List   Diagnosis   • Arthritis   • CAD (coronary artery disease)   • Acute myocardial infarction, initial episode of care (HCC)   • Cardiomyopathy (HCC)   • Cerebral artery occlusion with cerebral infarction ([facility])   • Chronic anticoagulation   • PAF (paroxysmal atrial fibrillation) ([facility])   • Chronic bronchitis, obstructive ([facility])   • Type 2 diabetes mellitus with diabetic polyneuropathy, without long-term current use of insulin ([facility])   • Diabetic neuropathy, painful ([facility])   • Dysmetabolic syndrome   • Essential hypertension   • Gout   • Hammer toes of both feet   • Paraesophageal hiatal hernia   • History of colon polyps   • Irritable bowel syndrome   • Low back pain   • Mixed hyperlipidemia   • Peripheral nerve disease   • Restless legs syndrome   • Seasonal allergic rhinitis, unspecified allergic rhinitis trigger   • Callus of heel   • Hypercholesteremia   • Acquired hypothyroidism     Advance Care Planning  Advance Directive is on file.  ACP discussion was held with the patient during this visit. Patient has an advance directive in EMR which is still valid.           Objective    Vitals:    07/01/22 0745   BP: 130/82   BP Location: Left arm   Patient Position: Sitting   Cuff Size: Adult   Pulse: 92   Temp: 97.8 °F (36.6 °C)   TempSrc: Temporal   SpO2: 98%   Weight: 98 kg (216 lb)   Height: 188 cm (74\")   PainSc: 0-No pain " "    Estimated body mass index is 27.73 kg/m² as calculated from the following:    Height as of this encounter: 188 cm (74\").    Weight as of this encounter: 98 kg (216 lb).    BMI is >= 25 and <30. (Overweight) The following options were offered after discussion;: exercise counseling/recommendations and nutrition counseling/recommendations      Does the patient have evidence of cognitive impairment? No    Physical Exam  Vitals and nursing note reviewed.   Constitutional:       General: He is not in acute distress.     Appearance: Normal appearance. He is not ill-appearing, toxic-appearing or diaphoretic.   HENT:      Head: Normocephalic and atraumatic.      Right Ear: Tympanic membrane, ear canal and external ear normal. There is no impacted cerumen.      Left Ear: Tympanic membrane, ear canal and external ear normal. There is no impacted cerumen.      Nose: Nose normal.      Mouth/Throat:      Mouth: Mucous membranes are moist.      Pharynx: Oropharynx is clear. No oropharyngeal exudate or posterior oropharyngeal erythema.   Eyes:      Extraocular Movements: Extraocular movements intact.      Conjunctiva/sclera: Conjunctivae normal.      Pupils: Pupils are equal, round, and reactive to light.   Neck:      Thyroid: No thyroid mass, thyromegaly or thyroid tenderness.      Vascular: No carotid bruit.   Cardiovascular:      Rate and Rhythm: Normal rate and regular rhythm.      Pulses: Normal pulses.           Dorsalis pedis pulses are 2+ on the right side and 2+ on the left side.        Posterior tibial pulses are 2+ on the right side and 2+ on the left side.      Heart sounds: Normal heart sounds.   Pulmonary:      Effort: Pulmonary effort is normal.      Breath sounds: Normal breath sounds.   Abdominal:      General: Bowel sounds are normal. There is no distension.      Palpations: Abdomen is soft.      Tenderness: There is no abdominal tenderness. There is no guarding.   Musculoskeletal:         General: Normal " range of motion.      Cervical back: Normal range of motion and neck supple. No rigidity or tenderness.   Feet:      Right foot:      Protective Sensation: 10 sites tested. 7 sites sensed.      Skin integrity: Skin integrity normal.      Toenail Condition: Fungal disease present.     Left foot:      Protective Sensation: 10 sites tested. 8 sites sensed.      Skin integrity: Skin integrity normal.      Toenail Condition: Fungal disease present.     Comments: Educated on the importance of meticulous foot hygiene, he states under standing of this.  Lymphadenopathy:      Cervical: No cervical adenopathy.   Skin:     General: Skin is warm and dry.      Capillary Refill: Capillary refill takes less than 2 seconds.   Neurological:      General: No focal deficit present.      Mental Status: He is alert and oriented to person, place, and time. Mental status is at baseline.   Psychiatric:         Mood and Affect: Mood normal.         Thought Content: Thought content normal.         Judgment: Judgment normal.                 HEALTH RISK ASSESSMENT    Smoking Status:  Social History     Tobacco Use   Smoking Status Never Smoker   Smokeless Tobacco Never Used     Alcohol Consumption:  Social History     Substance and Sexual Activity   Alcohol Use No     Fall Risk Screen:    STEADI Fall Risk Assessment was completed, and patient is at LOW risk for falls.Assessment completed on:7/1/2022    Depression Screening:  PHQ-2/PHQ-9 Depression Screening 7/1/2022   Retired PHQ-9 Total Score -   Retired Total Score -   Little Interest or Pleasure in Doing Things 0-->not at all   Feeling Down, Depressed or Hopeless 0-->not at all   PHQ-9: Brief Depression Severity Measure Score 0       Health Habits and Functional and Cognitive Screening:  Functional & Cognitive Status 7/1/2022   Do you have difficulty preparing food and eating? No   Do you have difficulty bathing yourself, getting dressed or grooming yourself? No   Do you have difficulty  using the toilet? No   Do you have difficulty moving around from place to place? No   Do you have trouble with steps or getting out of a bed or a chair? No   Current Diet Well Balanced Diet   Dental Exam Up to date   Eye Exam Up to date   Exercise (times per week) 3 times per week   Current Exercises Include Walking   Current Exercise Activities Include -   Do you need help using the phone?  No   Are you deaf or do you have serious difficulty hearing?  No   Do you need help with transportation? No   Do you need help shopping? No   Do you need help preparing meals?  No   Do you need help with housework?  No   Do you need help with laundry? No   Do you need help taking your medications? No   Do you need help managing money? No   Do you ever drive or ride in a car without wearing a seat belt? No   Have you felt unusual stress, anger or loneliness in the last month? No   Who do you live with? Spouse   If you need help, do you have trouble finding someone available to you? No   Have you been bothered in the last four weeks by sexual problems? No   Do you have difficulty concentrating, remembering or making decisions? No       Age-appropriate Screening Schedule:  Refer to the list below for future screening recommendations based on patient's age, sex and/or medical conditions. Orders for these recommended tests are listed in the plan section. The patient has been provided with a written plan.    Health Maintenance   Topic Date Due   • HEMOGLOBIN A1C  05/29/2022   • TDAP/TD VACCINES (1 - Tdap) 07/01/2023 (Originally 9/5/1974)   • ZOSTER VACCINE (1 of 2) 07/01/2023 (Originally 9/5/2005)   • INFLUENZA VACCINE  10/01/2022   • DIABETIC EYE EXAM  11/01/2022   • LIPID PANEL  01/14/2023   • URINE MICROALBUMIN  01/14/2023   • DIABETIC FOOT EXAM  07/01/2023              Assessment & Plan   CMS Preventative Services Quick Reference  Risk Factors Identified During Encounter  Cardiovascular Disease  Dementia/Memory    Depression/Dysphoria  Fall Risk-High or Moderate  Immunizations Discussed/Encouraged (specific Immunizations; Tdap, Shingrix and COVID19  The above risks/problems have been discussed with the patient.  Follow up actions/plans if indicated are seen below in the Assessment/Plan Section.  Pertinent information has been shared with the patient in the After Visit Summary.    Diagnoses and all orders for this visit:    1. Medicare annual wellness visit, subsequent (Primary)    2. Essential hypertension  -     Comprehensive Metabolic Panel  -     Uric Acid  -     Urinalysis With Microscopic - Urine, Clean Catch    3. Mixed hyperlipidemia  -     Lipid Panel  -     TSH    4. Type 2 diabetes mellitus with diabetic polyneuropathy, without long-term current use of insulin (HCC)  -     MicroAlbumin, Urine, Random - Urine, Clean Catch  -     POC Glycated Hemoglobin, Total; Future    5. Screening PSA (prostate specific antigen)  -     PSA Screen    6. Encounter for long-term current use of medication  -     CBC & Differential    7. Need for hepatitis C screening test  -     Hepatitis C Antibody    8. PAF (paroxysmal atrial fibrillation) (HCC)    9. Seasonal allergic rhinitis due to pollen    10. Acquired hypothyroidism    11. Chronic bronchitis, obstructive (HCC)    12. Cardiomyopathy, unspecified type (HCC)        Follow Up:   Return in about 6 months (around 1/1/2023) for Recheck.     An After Visit Summary and PPPS were made available to the patient.      Plan of care reviewed with Mr. Cárdenas, he is fasting today and will get fasting lab work done as ordered.  We will call with results when available.  Exam was unremarkable.  A1c today is 5.8, was 5.8 on last assessment as well.  He will continue to have his care managed by Dr. Castillo.  PAF will continue to be managed by cardiology along with cardiomyopathy.  Hypertension is currently well controlled blood pressure today is 130/82.  He is agreeable to doing a hepatitis C  antibody screening test.  We will except assess hypothyroidism via lab work done today, continue current therapy for now.  He reports no issues with his chronic bronchitis, continue with current therapy.  Seasonal allergies are well controlled with current therapy, will continue this.     He is currently up-to-date on vaccinations.  Routinely sees ophthalmology and orthodontist.  Did advise continued use of sunscreen when outside and utilizing seatbelt when in car.  Okay to follow-up in 6 months, can be seen prior to this as needed.

## 2022-07-02 LAB
ALBUMIN SERPL-MCNC: 4.6 G/DL (ref 3.5–5.2)
ALBUMIN/GLOB SERPL: 1.5 G/DL
ALP SERPL-CCNC: 88 U/L (ref 39–117)
ALT SERPL-CCNC: 26 U/L (ref 1–41)
APPEARANCE UR: CLEAR
AST SERPL-CCNC: 28 U/L (ref 1–40)
BACTERIA #/AREA URNS HPF: NORMAL /HPF
BASOPHILS # BLD AUTO: 0.11 10*3/MM3 (ref 0–0.2)
BASOPHILS NFR BLD AUTO: 1.5 % (ref 0–1.5)
BILIRUB SERPL-MCNC: 0.5 MG/DL (ref 0–1.2)
BILIRUB UR QL STRIP: NEGATIVE
BUN SERPL-MCNC: 24 MG/DL (ref 8–23)
BUN/CREAT SERPL: 20.7 (ref 7–25)
CALCIUM SERPL-MCNC: 9.2 MG/DL (ref 8.6–10.5)
CASTS URNS MICRO: NORMAL
CHLORIDE SERPL-SCNC: 105 MMOL/L (ref 98–107)
CHOLEST SERPL-MCNC: 127 MG/DL (ref 0–200)
CO2 SERPL-SCNC: 23.8 MMOL/L (ref 22–29)
COLOR UR: YELLOW
CREAT SERPL-MCNC: 1.16 MG/DL (ref 0.76–1.27)
EGFRCR SERPLBLD CKD-EPI 2021: 69.5 ML/MIN/1.73
EOSINOPHIL # BLD AUTO: 0.33 10*3/MM3 (ref 0–0.4)
EOSINOPHIL NFR BLD AUTO: 4.5 % (ref 0.3–6.2)
EPI CELLS #/AREA URNS HPF: NORMAL /HPF
ERYTHROCYTE [DISTWIDTH] IN BLOOD BY AUTOMATED COUNT: 13.2 % (ref 12.3–15.4)
GLOBULIN SER CALC-MCNC: 3 GM/DL
GLUCOSE SERPL-MCNC: 103 MG/DL (ref 65–99)
GLUCOSE UR QL STRIP: ABNORMAL
HCT VFR BLD AUTO: 49.6 % (ref 37.5–51)
HCV AB S/CO SERPL IA: 0.1 S/CO RATIO (ref 0–0.9)
HDLC SERPL-MCNC: 29 MG/DL (ref 40–60)
HGB BLD-MCNC: 16.4 G/DL (ref 13–17.7)
HGB UR QL STRIP: NEGATIVE
IMM GRANULOCYTES # BLD AUTO: 0.02 10*3/MM3 (ref 0–0.05)
IMM GRANULOCYTES NFR BLD AUTO: 0.3 % (ref 0–0.5)
KETONES UR QL STRIP: NEGATIVE
LDLC SERPL CALC-MCNC: 64 MG/DL (ref 0–100)
LEUKOCYTE ESTERASE UR QL STRIP: NEGATIVE
LYMPHOCYTES # BLD AUTO: 1.3 10*3/MM3 (ref 0.7–3.1)
LYMPHOCYTES NFR BLD AUTO: 17.5 % (ref 19.6–45.3)
MCH RBC QN AUTO: 31.5 PG (ref 26.6–33)
MCHC RBC AUTO-ENTMCNC: 33.1 G/DL (ref 31.5–35.7)
MCV RBC AUTO: 95.2 FL (ref 79–97)
MICROALBUMIN UR-MCNC: <3 UG/ML
MONOCYTES # BLD AUTO: 0.69 10*3/MM3 (ref 0.1–0.9)
MONOCYTES NFR BLD AUTO: 9.3 % (ref 5–12)
NEUTROPHILS # BLD AUTO: 4.96 10*3/MM3 (ref 1.7–7)
NEUTROPHILS NFR BLD AUTO: 66.9 % (ref 42.7–76)
NITRITE UR QL STRIP: NEGATIVE
NRBC BLD AUTO-RTO: 0 /100 WBC (ref 0–0.2)
PH UR STRIP: 6 [PH] (ref 5–8)
PLATELET # BLD AUTO: 232 10*3/MM3 (ref 140–450)
POTASSIUM SERPL-SCNC: 4.7 MMOL/L (ref 3.5–5.2)
PROT SERPL-MCNC: 7.6 G/DL (ref 6–8.5)
PROT UR QL STRIP: NEGATIVE
PSA SERPL-MCNC: 0.35 NG/ML (ref 0–4)
RBC # BLD AUTO: 5.21 10*6/MM3 (ref 4.14–5.8)
RBC #/AREA URNS HPF: NORMAL /HPF
SODIUM SERPL-SCNC: 141 MMOL/L (ref 136–145)
SP GR UR STRIP: 1.02 (ref 1–1.03)
TRIGL SERPL-MCNC: 207 MG/DL (ref 0–150)
TSH SERPL DL<=0.005 MIU/L-ACNC: 1.39 UIU/ML (ref 0.27–4.2)
URATE SERPL-MCNC: 4.6 MG/DL (ref 3.4–7)
UROBILINOGEN UR STRIP-MCNC: ABNORMAL MG/DL
VLDLC SERPL CALC-MCNC: 34 MG/DL (ref 5–40)
WBC # BLD AUTO: 7.41 10*3/MM3 (ref 3.4–10.8)
WBC #/AREA URNS HPF: NORMAL /HPF

## 2022-07-05 NOTE — PROGRESS NOTES
BUN is slightly elevated, likely related to slight dehydration, ensure adequate hydration with at least 8 glasses of 8 ounces water daily.  Improvement noted in cholesterol, however triglycerides are still slightly elevated, advise avoiding sugary foods.  HDL which is good cholesterol is low at 29, incorporate more healthy fats such as fish olive oil nuts seeds and avocado.  All other labs stable.

## 2022-07-08 ENCOUNTER — ANTI-COAG VISIT (OUTPATIENT)
Dept: CARDIOLOGY CLINIC | Age: 67
End: 2022-07-08
Payer: MEDICARE

## 2022-07-08 DIAGNOSIS — I48.0 PAF (PAROXYSMAL ATRIAL FIBRILLATION) (HCC): Primary | ICD-10-CM

## 2022-07-08 LAB
INTERNATIONAL NORMALIZATION RATIO, POC: 2
PROTHROMBIN TIME, POC: NORMAL

## 2022-07-08 PROCEDURE — 93793 ANTICOAG MGMT PT WARFARIN: CPT | Performed by: NURSE PRACTITIONER

## 2022-07-08 PROCEDURE — 85610 PROTHROMBIN TIME: CPT | Performed by: NURSE PRACTITIONER

## 2022-07-12 DIAGNOSIS — G25.81 RESTLESS LEG SYNDROME: ICD-10-CM

## 2022-07-12 RX ORDER — BACLOFEN 10 MG/1
TABLET ORAL
Qty: 90 TABLET | Refills: 3 | Status: SHIPPED | OUTPATIENT
Start: 2022-07-12

## 2022-08-12 ENCOUNTER — ANTI-COAG VISIT (OUTPATIENT)
Dept: CARDIOLOGY CLINIC | Age: 67
End: 2022-08-12
Payer: MEDICARE

## 2022-08-12 DIAGNOSIS — I48.0 PAF (PAROXYSMAL ATRIAL FIBRILLATION) (HCC): Primary | ICD-10-CM

## 2022-08-12 LAB
INTERNATIONAL NORMALIZATION RATIO, POC: 3
PROTHROMBIN TIME, POC: NORMAL

## 2022-08-12 PROCEDURE — 85610 PROTHROMBIN TIME: CPT | Performed by: NURSE PRACTITIONER

## 2022-08-17 DIAGNOSIS — E11.69 TYPE 2 DIABETES MELLITUS WITH OTHER SPECIFIED COMPLICATION, UNSPECIFIED WHETHER LONG TERM INSULIN USE: ICD-10-CM

## 2022-09-29 ENCOUNTER — OFFICE VISIT (OUTPATIENT)
Dept: CARDIOLOGY CLINIC | Age: 67
End: 2022-09-29
Payer: MEDICARE

## 2022-09-29 VITALS
DIASTOLIC BLOOD PRESSURE: 96 MMHG | HEIGHT: 74 IN | WEIGHT: 217 LBS | HEART RATE: 77 BPM | SYSTOLIC BLOOD PRESSURE: 146 MMHG | BODY MASS INDEX: 27.85 KG/M2

## 2022-09-29 DIAGNOSIS — E78.5 DYSLIPIDEMIA: ICD-10-CM

## 2022-09-29 DIAGNOSIS — I10 ESSENTIAL HYPERTENSION: ICD-10-CM

## 2022-09-29 DIAGNOSIS — I48.0 PAF (PAROXYSMAL ATRIAL FIBRILLATION) (HCC): Primary | ICD-10-CM

## 2022-09-29 DIAGNOSIS — I25.10 CORONARY ARTERY DISEASE INVOLVING NATIVE CORONARY ARTERY OF NATIVE HEART WITHOUT ANGINA PECTORIS: ICD-10-CM

## 2022-09-29 LAB
INTERNATIONAL NORMALIZATION RATIO, POC: 2.8
PROTHROMBIN TIME, POC: 32

## 2022-09-29 PROCEDURE — G8417 CALC BMI ABV UP PARAM F/U: HCPCS | Performed by: NURSE PRACTITIONER

## 2022-09-29 PROCEDURE — G8427 DOCREV CUR MEDS BY ELIG CLIN: HCPCS | Performed by: NURSE PRACTITIONER

## 2022-09-29 PROCEDURE — 1036F TOBACCO NON-USER: CPT | Performed by: NURSE PRACTITIONER

## 2022-09-29 PROCEDURE — 99214 OFFICE O/P EST MOD 30 MIN: CPT | Performed by: NURSE PRACTITIONER

## 2022-09-29 PROCEDURE — 1123F ACP DISCUSS/DSCN MKR DOCD: CPT | Performed by: NURSE PRACTITIONER

## 2022-09-29 PROCEDURE — 3017F COLORECTAL CA SCREEN DOC REV: CPT | Performed by: NURSE PRACTITIONER

## 2022-09-29 PROCEDURE — 93000 ELECTROCARDIOGRAM COMPLETE: CPT | Performed by: NURSE PRACTITIONER

## 2022-09-29 PROCEDURE — 85610 PROTHROMBIN TIME: CPT | Performed by: NURSE PRACTITIONER

## 2022-09-29 RX ORDER — LISINOPRIL 10 MG/1
10 TABLET ORAL DAILY
COMMUNITY

## 2022-09-29 ASSESSMENT — ENCOUNTER SYMPTOMS
SORE THROAT: 0
WHEEZING: 0
SHORTNESS OF BREATH: 0
COUGH: 0
CHEST TIGHTNESS: 0

## 2022-09-29 NOTE — PROGRESS NOTES
[facility]   Established Patient Office Visit   [facility] [address] [facility]  [phone]        OFFICE VISIT:  2022    Vincent Snow Morton - : 1955    Reason For Visit:  Selam Figueroa is a 79 y.o. male who is here for 6 Month Follow-Up    1. PAF (paroxysmal atrial fibrillation) ([address]    2. Coronary artery disease involving native coronary artery of native heart without angina pectoris    3. Essential hypertension    4. Dyslipidemia    5. Ischemic cardiomyopathy      Patient with a history of coronary artery disease, ischemic cardiomyopathy, hypertension, hyperlipidemia, and paroxysmal atrial fib. Patient presents to clinic today for 6-month follow-up. Patient denies any complaints of chest pain, pressure or tightness. There is no shortness of breath, orthopnea or PND. Patient denies any lightheadedness, dizziness or syncope.          DATA:     1998  SE  negative for myocardial ischemia  2001  Cath  NCA with anterior/apical akinesis  2002  Echo  EF  45%  2003  cardiolite  negative for myocardial ischemia, EF 58%  2004  SE  negative for myocardial ischemia  2008  Echo  Normal LVFX, diastolic dysfunction\    SE  negative for myocardial ischemia  02/15/2012  SE  negative for myocardial ischemia [facility])  16 Echo mild-mod MR, mild AR, EF 49%     Subjective    Jerson Jain is a 79 y.o. male with the following history as recorded in [facility]:    Patient Active Problem List    Diagnosis Date Noted    Syncope 2012    Cardiomyopathy ([address] 2011    Chest pain 2019    Chronic anticoagulation 2017    PAF (paroxysmal atrial fibrillation) ([address] 2015    MI (myocardial infarction) ([address]     CAD (coronary artery disease)     Gout 2012    Diabetes mellitus ([address]     Cerebral artery occlusion with cerebral infarction ([address]     Hyperlipidemia     Hypertension      Current Outpatient Medications   Medication Sig Dispense Refill lisinopril (PRINIVIL;ZESTRIL) 10 MG tablet Take 10 mg by mouth daily      warfarin (COUMADIN) 5 MG tablet TAKE 1 TO 2 TABLETS BY MOUTH ONCE DAILY 180 tablet 3    Chlorphen-Pseudoephed-APAP (CORICIDIN D PO) Take by mouth as needed      omeprazole (PRILOSEC) 40 MG delayed release capsule Take 40 mg by mouth daily  2    Chromium 200 MCG TABS Take 200 mcg by mouth daily      vitamin B-12 (CYANOCOBALAMIN) 1000 MCG tablet Take 1,000 mcg by mouth 2 times daily      gabapentin (NEURONTIN) 100 MG capsule Take 100 mg by mouth 3 times daily as needed. .      cloNIDine (CATAPRES) 0.1 MG tablet Take 1 tablet by mouth daily 90 tablet 3    VICTOZA 18 MG/3ML SOPN SC injection Inject 1.8 mg into the skin daily       fluticasone (FLONASE) 50 MCG/ACT nasal spray 2 sprays by Nasal route daily       allopurinol (ZYLOPRIM) 300 MG tablet Take 0.5 tablet daily      baclofen (LIORESAL) 10 MG tablet Take 10 mg by mouth 2 times daily       levothyroxine (SYNTHROID) 112 MCG tablet Take 112 mcg by mouth daily        No current facility-administered medications for this visit. Allergies: Patient has no known allergies. Past Medical History:   Diagnosis Date    CAD (coronary artery disease)     Cardiomyopathy [facility] 6/23/2011    Diabetes mellitus ([facility]     Type 2    Gout 2/8/2012    Hypercholesteremia     Hyperlipidemia     Hypertension     MI (myocardial infarction) ([facility]     Other chest pain     history of conversion reaction. Other primary cardiomyopathies     Syncope 2/8/2012    history of near syncopal episodes. Unspecified cerebral artery occlusion with cerebral infarction     history of stroke. Past Surgical History:   Procedure Laterality Date    BACK SURGERY  1974    spinal fusion-lumbar    CARDIAC CATHETERIZATION  02/21/06  JDT    Left heart cath    CARDIAC CATHETERIZATION  02/21/2001    EF is 75%.   See scanned document.-Dr Miriam Griffith    CARDIAC SURGERY  05/26/2006    Loop recorder placed-Dr Miriam Griffith    COLONOSCOPY       EXTREMITIES - no significant edema   NEUROLOGIC - gait and station are normal  SKIN - turgor is normal, can warm and dry. PSYCHIATRIC - normal mood and affect, alert and orientated x 3,      ASSESSMENT:    ALL THE CARDIOLOGY PROBLEMS ARE LISTED ABOVE; HOWEVER, THE FOLLOWING SPECIFIC CARDIAC PROBLEMS / CONDITIONS WERE ADDRESSED AND TREATED DURING THE OFFICE VISIT TODAY:                                                                                            MEDICAL DECISION MAKING             Cardiac Specific Problem / Diagnosis  Discussion and Data Reviewed Diagnostic Procedures Ordered Management Options Selected           1. CAD  Stable   Review and summation of old records:    No chest pain. Patient is on lisinopril. .  EKG in the office today showing sinus rhythm with a heart rate of 77 bpm. Yes Continue current medications:     Yes           2. PAF  Stable   EKG in the office showing sinus rhythm. He is on Coumadin 5 mg daily. INR 2.8 today. Continue present medical management. No bleeding issues. Yes Continue current medications:    Yes           3. Dyslipidemia Stable Diet controlled No Continue current medications:       Yes           4. Hypertension Elevated today. Patient states he has not taken his blood pressure medicine. He is on lisinopril 10 mg daily and clonidine 0.1 mg daily. Home blood pressure readings are 846Q systolic over 14-92 diastolic. No Continue current medications:       Yes     Orders Placed This Encounter   Procedures    POCT INR    EKG 12 lead     Order Specific Question:   Reason for Exam?     Answer:   Irregular heart rate       No orders of the defined types were placed in this encounter. Discussed with patient. Return in about 6 months (around 3/29/2023) for Dr Sharee Hess . I greatly appreciate the opportunity to meet Ragini Sommer and your confidence in allowing me to participate in his cardiovascular care.     BEVERLY Gil NP  9/29/2022 8:29 AM CDT This dictation was generated by voice recognition computer software. Although all attempts are made to edit dictation for accuracy, there may be errors in the transcription that are not intended.

## 2022-10-06 DIAGNOSIS — E11.40 DIABETIC NEUROPATHY, PAINFUL: ICD-10-CM

## 2022-10-06 RX ORDER — GABAPENTIN 100 MG/1
CAPSULE ORAL
Qty: 90 CAPSULE | Refills: 0 | Status: SHIPPED | OUTPATIENT
Start: 2022-10-06 | End: 2023-01-03

## 2022-10-31 RX ORDER — LISINOPRIL 10 MG/1
TABLET ORAL
Qty: 90 TABLET | Refills: 3 | Status: SHIPPED | OUTPATIENT
Start: 2022-10-31

## 2022-11-17 ENCOUNTER — TELEPHONE (OUTPATIENT)
Dept: CARDIOLOGY CLINIC | Age: 67
End: 2022-11-17

## 2022-11-18 ENCOUNTER — ANTI-COAG VISIT (OUTPATIENT)
Dept: CARDIOLOGY CLINIC | Age: 67
End: 2022-11-18
Payer: MEDICARE

## 2022-11-18 DIAGNOSIS — I48.0 PAF (PAROXYSMAL ATRIAL FIBRILLATION) (HCC): Primary | ICD-10-CM

## 2022-11-18 LAB
INTERNATIONAL NORMALIZATION RATIO, POC: 3.1
PROTHROMBIN TIME, POC: NORMAL

## 2022-11-18 PROCEDURE — 85610 PROTHROMBIN TIME: CPT | Performed by: CLINICAL NURSE SPECIALIST

## 2022-12-16 ENCOUNTER — ANTI-COAG VISIT (OUTPATIENT)
Dept: CARDIOLOGY CLINIC | Age: 67
End: 2022-12-16

## 2022-12-16 DIAGNOSIS — I48.0 PAF (PAROXYSMAL ATRIAL FIBRILLATION) (HCC): Primary | ICD-10-CM

## 2022-12-16 LAB
INTERNATIONAL NORMALIZATION RATIO, POC: 2.9
PROTHROMBIN TIME, POC: 31.2

## 2022-12-30 DIAGNOSIS — E11.40 DIABETIC NEUROPATHY, PAINFUL: ICD-10-CM

## 2023-01-02 ENCOUNTER — APPOINTMENT (OUTPATIENT)
Dept: GENERAL RADIOLOGY | Facility: HOSPITAL | Age: 68
End: 2023-01-02
Payer: MEDICARE

## 2023-01-02 ENCOUNTER — HOSPITAL ENCOUNTER (EMERGENCY)
Facility: HOSPITAL | Age: 68
Discharge: HOME | End: 2023-01-02
Attending: EMERGENCY MEDICINE | Admitting: EMERGENCY MEDICINE
Payer: MEDICARE

## 2023-01-02 VITALS
WEIGHT: 214 LBS | RESPIRATION RATE: 20 BRPM | SYSTOLIC BLOOD PRESSURE: 134 MMHG | DIASTOLIC BLOOD PRESSURE: 77 MMHG | HEART RATE: 69 BPM | TEMPERATURE: 98.3 F | OXYGEN SATURATION: 99 % | HEIGHT: 74 IN | BODY MASS INDEX: 27.46 KG/M2

## 2023-01-02 DIAGNOSIS — S42.91XA CLOSED FRACTURE OF RIGHT SHOULDER, INITIAL ENCOUNTER: Primary | ICD-10-CM

## 2023-01-02 DIAGNOSIS — M19.90 ARTHRITIS: ICD-10-CM

## 2023-01-02 PROCEDURE — 73060 X-RAY EXAM OF HUMERUS: CPT

## 2023-01-02 PROCEDURE — 99283 EMERGENCY DEPT VISIT LOW MDM: CPT

## 2023-01-02 PROCEDURE — 73030 X-RAY EXAM OF SHOULDER: CPT

## 2023-01-02 RX ORDER — OXYCODONE AND ACETAMINOPHEN 7.5; 325 MG/1; MG/1
1 TABLET ORAL EVERY 6 HOURS PRN
Qty: 6 TABLET | Refills: 0 | Status: SHIPPED | OUTPATIENT
Start: 2023-01-02 | End: 2023-03-16

## 2023-01-02 RX ORDER — OXYCODONE AND ACETAMINOPHEN 7.5; 325 MG/1; MG/1
1 TABLET ORAL ONCE
Status: COMPLETED | OUTPATIENT
Start: 2023-01-02 | End: 2023-01-02

## 2023-01-02 RX ADMIN — OXYCODONE HYDROCHLORIDE AND ACETAMINOPHEN 1 TABLET: 7.5; 325 TABLET ORAL at 14:05

## 2023-01-02 NOTE — ED PROVIDER NOTES
Subjective   History of Present Illness  Patient is a67 years old who hurt his shoulder on Thursday.  He does not remember falling.  The patient said that she was carrying a big cooler and did not want to drop the cooler lost his balance fell on the cooler and heard a pop in his right shoulder.  He does not remember falling or hitting anything.  Denies any neck pain chest pain abdominal pain.  Came the ER today because of increasing pain.  No other complaint associate with this.    Shoulder Injury  Location:  Right shoulder injury  Severity:  Moderate  Onset quality:  Sudden  Chronicity:  New  Associated symptoms: no abdominal pain, no chest pain, no congestion, no cough, no diarrhea, no fatigue, no fever, no headaches, no loss of consciousness, no myalgias, no nausea, no rhinorrhea, no shortness of breath and no vomiting        Review of Systems   Constitutional: Negative.  Negative for chills, fatigue and fever.   HENT: Negative.  Negative for congestion and rhinorrhea.    Respiratory: Negative.  Negative for cough, chest tightness, shortness of breath and stridor.    Cardiovascular: Negative.  Negative for chest pain.   Gastrointestinal: Negative.  Negative for abdominal distention, abdominal pain, diarrhea, nausea and vomiting.   Endocrine: Negative.    Genitourinary: Negative.  Negative for difficulty urinating and flank pain.   Musculoskeletal: Negative.  Negative for myalgias.   Skin: Negative.  Negative for color change.   Neurological: Negative.  Negative for dizziness, loss of consciousness and headaches.   All other systems reviewed and are negative.      Past Medical History:   Diagnosis Date   • Arthritis    • Cataract    • Coronary artery disease    • Diabetes mellitus (HCC)    • Disease of thyroid gland    • Gout    • Hypertension    • Myocardial infarct, old     24 yrs ago   • Neuropathy     in feet and legs   • Parotid tumor    • Spastic colon    • TIA (transient ischemic attack)     weakness on  left upper extremity       Allergies   Allergen Reactions   • Metformin GI Intolerance     GI upset in low dose       Past Surgical History:   Procedure Laterality Date   • BACK SURGERY      had spinal fusion   • OTHER SURGICAL HISTORY      had goiter removed  at 4 yrs old   • OTHER SURGICAL HISTORY Right     had broken arm,   • PAROTIDECTOMY Left 3/21/2017    Procedure: PAROTID TUMOR EXCISION WITH FACIAL NERVE MONITORING,  DISSECTION LEFT;  Surgeon: Armond Leon MD;  Location: [facility];  Service:        Family History   Problem Relation Age of Onset   • No Known Problems Mother    • No Known Problems Father        Social History     Socioeconomic History   • Marital status:    Tobacco Use   • Smoking status: Never   • Smokeless tobacco: Never   Substance and Sexual Activity   • Alcohol use: No   • Drug use: No   • Sexual activity: Not Currently     Partners: Female           Objective   Physical Exam  Vitals and nursing note reviewed. Exam conducted with a chaperone present.   Constitutional:       General: He is awake.      Appearance: Normal appearance. He is well-developed. He is not ill-appearing.   HENT:      Head: Normocephalic and atraumatic.   Eyes:      General: Lids are normal.      Conjunctiva/sclera: Conjunctivae normal.      Pupils: Pupils are equal, round, and reactive to light.   Cardiovascular:      Rate and Rhythm: Normal rate and regular rhythm.      Chest Wall: PMI is not displaced.      Pulses: Normal pulses.      Heart sounds: Normal heart sounds.   Pulmonary:      Effort: Pulmonary effort is normal.      Breath sounds: Normal breath sounds. No decreased breath sounds.   Chest:      Chest wall: No deformity, tenderness or crepitus.   Abdominal:      General: Abdomen is flat. Bowel sounds are normal.      Palpations: Abdomen is soft.      Tenderness: There is no abdominal tenderness.   Musculoskeletal:         General: Normal range of motion.      Cervical back: Full passive  range of motion without pain, normal range of motion and neck supple. No rigidity or tenderness.      Comments: Right shoulder i.e. humerus pulm bruising which appears old range of motion of the right shoulder is limited.  Neuro vas examination is negative radial pulse are palpable.  No evidence of any wrist drop full range of motion of the left wrist and over the 5 fingers.  Elbow examination unremarkable.   Skin:     General: Skin is warm and dry.      Capillary Refill: Capillary refill takes less than 2 seconds.   Neurological:      General: No focal deficit present.      Mental Status: He is alert and oriented to person, place, and time.      Motor: Motor function is intact.      Deep Tendon Reflexes: Reflexes are normal and symmetric.   Psychiatric:         Behavior: Behavior is cooperative.         Procedures           ED Course  ED Course as of 01/02/23 1322   Mon Jan 02, 2023   1317 Discussed the patient family will need orthopedic follow-up [TS]      ED Course User Index  [TS] Nestor Adame MD                                   [facility] reviewed by Nestor Adame MD       Medical Decision Making  Patient with a history of injury to the right shoulder on Thursday    Arthritis: acute illness or injury  Amount and/or Complexity of Data Reviewed  Radiology: ordered and independent interpretation performed.     Details: Fracture humeral head      Risk  Prescription drug management.          Final diagnoses:   Arthritis   Closed fracture of right shoulder, initial encounter       ED Disposition  ED Disposition     ED Disposition   Discharge    Condition   Stable    Comment   --             Kimberly Brothers, APRN  [address] [place] [address]  [phone]          Franklyn Gilbert MD  [address] [place] [address]  [place] [address]  [phone]    Schedule an appointment as soon as possible for a visit            Medication List      New Prescriptions    oxyCODONE-acetaminophen 7.5-325 MG per tablet  Commonly  known as: PERCOCET  Take 1 tablet by mouth Every 6 (Six) Hours As Needed for Moderate Pain.        Stop    Liraglutide 18 MG/3ML solution pen-injector injection  Commonly known as: VICTOZA           Where to Get Your Medications      These medications were sent to [id number] - [place], [place] - [address] - [phone]  - [phone]   [address] [place] [address] [place] [address]    Phone: [phone]   · oxyCODONE-acetaminophen 7.5-325 MG per tablet          Nestor Adame MD  01/02/23 2268

## 2023-01-03 RX ORDER — GABAPENTIN 100 MG/1
CAPSULE ORAL
Qty: 90 CAPSULE | Refills: 1 | Status: SHIPPED | OUTPATIENT
Start: 2023-01-03

## 2023-01-10 ENCOUNTER — OFFICE VISIT (OUTPATIENT)
Dept: INTERNAL MEDICINE | Facility: CLINIC | Age: 68
End: 2023-01-10
Payer: MEDICARE

## 2023-01-10 VITALS
SYSTOLIC BLOOD PRESSURE: 138 MMHG | WEIGHT: 218 LBS | HEART RATE: 94 BPM | TEMPERATURE: 96.9 F | DIASTOLIC BLOOD PRESSURE: 86 MMHG | HEIGHT: 74 IN | OXYGEN SATURATION: 95 % | BODY MASS INDEX: 27.98 KG/M2

## 2023-01-10 DIAGNOSIS — I10 ESSENTIAL HYPERTENSION: ICD-10-CM

## 2023-01-10 DIAGNOSIS — Z12.11 COLON CANCER SCREENING: ICD-10-CM

## 2023-01-10 DIAGNOSIS — E78.2 MIXED HYPERLIPIDEMIA: ICD-10-CM

## 2023-01-10 DIAGNOSIS — E03.9 ACQUIRED HYPOTHYROIDISM: ICD-10-CM

## 2023-01-10 DIAGNOSIS — E11.40 DIABETIC NEUROPATHY, PAINFUL: ICD-10-CM

## 2023-01-10 DIAGNOSIS — E11.649 TYPE 2 DIABETES MELLITUS WITH HYPOGLYCEMIA WITHOUT COMA, WITHOUT LONG-TERM CURRENT USE OF INSULIN: Primary | ICD-10-CM

## 2023-01-10 LAB — HBA1C MFR BLD: 6.1 %

## 2023-01-10 PROCEDURE — 3044F HG A1C LEVEL LT 7.0%: CPT

## 2023-01-10 PROCEDURE — 99214 OFFICE O/P EST MOD 30 MIN: CPT

## 2023-01-10 PROCEDURE — 83036 HEMOGLOBIN GLYCOSYLATED A1C: CPT

## 2023-01-10 RX ORDER — LIRAGLUTIDE 6 MG/ML
1.2 INJECTION SUBCUTANEOUS DAILY
COMMUNITY
End: 2023-02-03 | Stop reason: SDUPTHER

## 2023-01-10 RX ORDER — OXYCODONE HYDROCHLORIDE AND ACETAMINOPHEN 5; 325 MG/1; MG/1
1 TABLET ORAL EVERY 8 HOURS PRN
COMMUNITY
Start: 2023-01-05 | End: 2023-03-16

## 2023-01-10 NOTE — PROGRESS NOTES
Subjective   Jn Cárdenas is a 67 y.o. male.   Chief Complaint   Patient presents with   • Diabetes     A1C-6.1   • Hypertension     Follow up  Needs order for Cologuard        History of Present Illness   Mr. Cárdenas presents here today for a 6-month follow-up on type 2 diabetes with neuropathy, hypothyroidism and hypertension, he also would like to discuss colon cancer screening.  He reports that his endocrinologist in [place] has discharged him, states that his type 2 diabetes is under good control and can be managed by his PCP, if anything changes he will see him back.  He reports that he is currently taking 1.8 mg of Victoza daily as well as 10 mg of Farxiga, his previous A1c was 5.8, it has increased to 6.1, he does bring in a blood sugar log which reveals blood sugars as low as 70, typically before or shortly after eating dinner in the evenings, he states that he does not feel good with his blood sugars at that level.  He did go to the ER on 2 January after having a fall and sustained a closed fracture of the right humerus, he has since followed up with Dr. Gilbert, reports that he will be returning there for continued evaluation, they believe that it will heal on its own, he is wearing a sling today.  He reports that he was prescribed Percocets of which he has not had to take, he has been taking extra strength Tylenol instead.  He reports the neuropathy of his lower extremities as result of his diabetes is currently moderately well controlled with use of gabapentin, he states that he takes as prescribed and has not noted any negative side effects related to the use.  He reports he is compliant with all of his medications and denies any other issues or concerns today except that he would like a Cologuard order to be placed.  His wife reports that at his previous colonoscopy many years ago they had a great amount of difficulty waking him up, hence they are requesting a Cologuard.  He denies any issues such as  change in bowel patterns, bloody or black tarry stools.      The following portions of the patient's history were reviewed and updated as appropriate: allergies, current medications, past family history, past medical history, past social history, past surgical history and problem list.    Review of Systems    Objective   Past Medical History:   Diagnosis Date   • Arthritis    • Cataract    • Coronary artery disease    • Diabetes mellitus (HCC)    • Disease of thyroid gland    • Gout    • Hypertension    • Myocardial infarct, old     24 yrs ago   • Neuropathy     in feet and legs   • Parotid tumor    • Spastic colon    • TIA (transient ischemic attack)     weakness on left upper extremity      Past Surgical History:   Procedure Laterality Date   • BACK SURGERY      had spinal fusion   • OTHER SURGICAL HISTORY      had goiter removed  at 4 yrs old   • OTHER SURGICAL HISTORY Right     had broken arm,   • PAROTIDECTOMY Left 3/21/2017    Procedure: PAROTID TUMOR EXCISION WITH FACIAL NERVE MONITORING,  DISSECTION LEFT;  Surgeon: Armond Leon MD;  Location: [facility];  Service:         Current Outpatient Medications:   •  allopurinol (ZYLOPRIM) 300 MG tablet, TAKE 1/2 (ONE-HALF) TABLET BY MOUTH ONCE DAILY IN THE EVENING, Disp: 45 tablet, Rfl: 3  •  baclofen (LIORESAL) 10 MG tablet, 1 tablet nightly. May take up to 3 tablets if needed nightly, Disp: 90 tablet, Rfl: 3  •  celecoxib (CeleBREX) 100 MG capsule, Take 1 capsule by mouth once daily, Disp: 90 capsule, Rfl: 3  •  Chromium 200 MCG capsule, Take 1 capsule by mouth 2 (Two) Times a Day., Disp: , Rfl:   •  CloNIDine (CATAPRES) 0.1 MG tablet, Take 0.1 mg by mouth Daily., Disp: , Rfl:   •  dapagliflozin Propanediol 10 MG tablet, Take 10 mg by mouth Daily., Disp: 90 tablet, Rfl: 2  •  Diphenhydramine-PSE-APAP (ALLERGY SINUS HEADACHE RELIEF PO), Take 1 tablet by mouth Every Night. May take 1-2 daily for sinus headache, Disp: , Rfl:   •  EQL CINNAMON PO, Take 1,000 mg  by mouth Daily., Disp: , Rfl:   •  fluticasone (VERAMYST) 27.5 MCG/SPRAY nasal spray, 2 sprays into each nostril Daily., Disp: , Rfl:   •  gabapentin (NEURONTIN) 100 MG capsule, TAKE 1 TO 3 CAPSULES BY MOUTH NIGHTLY AS NEEDED FOR  NEUROPATHY, Disp: 90 capsule, Rfl: 1  •  glucose blood (OneTouch Ultra) test strip, 1 each by Other route 2 (Two) Times a Day. Use as instructed, Disp: 100 each, Rfl: 3  •  levothyroxine (SYNTHROID, LEVOTHROID) 112 MCG tablet, Take 1 tablet by mouth once daily, Disp: 90 tablet, Rfl: 3  •  lisinopril (PRINIVIL,ZESTRIL) 10 MG tablet, Take 1 tablet by mouth once daily, Disp: 90 tablet, Rfl: 3  •  Multiple Vitamins-Minerals (ALIVE MENS ENERGY) tablet, Take 1 tablet by mouth Daily., Disp: , Rfl:   •  NON FORMULARY, 1 application As Needed (is over the counter rollon applies to  legs prn  max freeze)., Disp: , Rfl:   •  NON FORMULARY, Take 2 tablets by mouth Daily. Healthy feet and nerves, Disp: , Rfl:   •  omeprazole (priLOSEC) 40 MG capsule, Take 40 mg by mouth Daily., Disp: , Rfl:   •  oxyCODONE-acetaminophen (PERCOCET) 7.5-325 MG per tablet, Take 1 tablet by mouth Every 6 (Six) Hours As Needed for Moderate Pain., Disp: 6 tablet, Rfl: 0  •  warfarin (COUMADIN) 5 MG tablet, Take 5 mg by mouth Daily. Take 7.5mg 3 days weekly, and  10 mg 4 days weekly, Disp: , Rfl:   •  Liraglutide (Victoza) 18 MG/3ML solution pen-injector injection, Inject 1.2 mg under the skin into the appropriate area as directed Daily., Disp: , Rfl:   •  oxyCODONE-acetaminophen (PERCOCET) 5-325 MG per tablet, Take 1 tablet by mouth Every 8 (Eight) Hours As Needed., Disp: , Rfl:       /86 (BP Location: Left arm, Patient Position: Sitting, Cuff Size: Adult)   Pulse 94   Temp 96.9 °F (36.1 °C) (Temporal)   Ht 188 cm (74\")   Wt 98.9 kg (218 lb)   SpO2 95%   BMI 27.99 kg/m²      Body mass index is 27.99 kg/m².         Physical Exam  Vitals and nursing note reviewed.   Constitutional:       General: He is not in acute  distress.     Appearance: Normal appearance. He is obese. He is not ill-appearing, toxic-appearing or diaphoretic.   HENT:      Head: Normocephalic and atraumatic.   Eyes:      Pupils: Pupils are equal, round, and reactive to light.   Cardiovascular:      Rate and Rhythm: Normal rate and regular rhythm.      Pulses: Normal pulses.           Dorsalis pedis pulses are 2+ on the right side and 2+ on the left side.        Posterior tibial pulses are 2+ on the right side and 2+ on the left side.      Heart sounds: Normal heart sounds.   Pulmonary:      Effort: Pulmonary effort is normal.      Breath sounds: Normal breath sounds.   Abdominal:      General: Bowel sounds are normal.      Palpations: Abdomen is soft.   Musculoskeletal:         General: Tenderness (right shoulder, sling in place ) present. Normal range of motion.      Cervical back: Normal range of motion and neck supple.      Right lower leg: No edema.      Left lower leg: No edema.      Right foot: Normal range of motion. No deformity, bunion, Charcot foot, foot drop or prominent metatarsal heads.      Left foot: Normal range of motion. No deformity, bunion, Charcot foot, foot drop or prominent metatarsal heads.   Feet:      Right foot:      Protective Sensation: 10 sites tested. 9 sites sensed.      Skin integrity: Skin integrity normal.      Toenail Condition: Right toenails are abnormally thick.      Left foot:      Protective Sensation: 10 sites tested. 9 sites sensed.      Skin integrity: Skin integrity normal.      Toenail Condition: Left toenails are abnormally thick.   Skin:     General: Skin is warm and dry.      Capillary Refill: Capillary refill takes less than 2 seconds.   Neurological:      General: No focal deficit present.      Mental Status: He is alert and oriented to person, place, and time. Mental status is at baseline.   Psychiatric:         Mood and Affect: Mood normal.         Behavior: Behavior normal.         Thought Content: Thought  content normal.         Judgment: Judgment normal.               Assessment & Plan   Diagnoses and all orders for this visit:    1. Type 2 diabetes mellitus with hypoglycemia without coma, without long-term current use of insulin (HCC) (Primary)  -     Basic metabolic panel; Future  -     POC Glycated Hemoglobin, Total  -     dapagliflozin Propanediol 10 MG tablet; Take 10 mg by mouth Daily.  Dispense: 90 tablet; Refill: 2    2. Diabetic neuropathy, painful (HCC)    3. Essential hypertension  -     Basic metabolic panel; Future    4. Mixed hyperlipidemia  -     Lipid panel; Future    5. Acquired hypothyroidism  -     TSH Rfx On Abnormal To Free T4; Future    6. Colon cancer screening  -     Cologuard - Stool, Per Rectum; Future               Plan of care reviewed with Mr. Cárdenas  Blood pressure here today is 138/86, continue with 10 mg lisinopril daily, order placed for BMP, he is not fasting today and would like to reassess his lipid panel, labs placed for future  Continue with current dose of levothyroxine, we will assess effect with labs  His A1c today is 6.1, has been having some hypoglycemia at home, lowest being 70 of which he is symptomatic of we will decrease his Victoza to 1.2 mg daily, I advised that if he continues to have symptomatic lower blood sugars that he can further decrease the Victoza to 0.6 mg daily but to please reach out and let me know if he does this, otherwise we will reassess A1c in 3 months time.  He reports that the gabapentin continues to work well and managing diabetic neuropathy, he will need a UDS and a CSA performed at his next visit.  Foot exam performed, slight decrease in sensation in bilateral feet, we discussed the importance of good foot hygiene, wearing supportive shoes.

## 2023-01-20 ENCOUNTER — ANTI-COAG VISIT (OUTPATIENT)
Dept: CARDIOLOGY CLINIC | Age: 68
End: 2023-01-20

## 2023-01-20 DIAGNOSIS — I48.0 PAF (PAROXYSMAL ATRIAL FIBRILLATION) (HCC): Primary | ICD-10-CM

## 2023-01-20 LAB
INTERNATIONAL NORMALIZATION RATIO, POC: 4.2
PROTHROMBIN TIME, POC: 43.6

## 2023-01-27 ENCOUNTER — ANTI-COAG VISIT (OUTPATIENT)
Dept: CARDIOLOGY CLINIC | Age: 68
End: 2023-01-27
Payer: MEDICARE

## 2023-01-27 DIAGNOSIS — I48.0 PAF (PAROXYSMAL ATRIAL FIBRILLATION) (HCC): Primary | ICD-10-CM

## 2023-01-27 LAB
INTERNATIONAL NORMALIZATION RATIO, POC: 2.2
PROTHROMBIN TIME, POC: 24.3

## 2023-01-27 PROCEDURE — 85610 PROTHROMBIN TIME: CPT | Performed by: CLINICAL NURSE SPECIALIST

## 2023-02-03 RX ORDER — LIRAGLUTIDE 6 MG/ML
1.2 INJECTION SUBCUTANEOUS DAILY
Qty: 3 ML | Refills: 2 | Status: SHIPPED | OUTPATIENT
Start: 2023-02-03

## 2023-02-10 ENCOUNTER — ANTI-COAG VISIT (OUTPATIENT)
Dept: CARDIOLOGY CLINIC | Age: 68
End: 2023-02-10
Payer: MEDICARE

## 2023-02-10 DIAGNOSIS — I48.0 PAF (PAROXYSMAL ATRIAL FIBRILLATION) (HCC): Primary | ICD-10-CM

## 2023-02-10 LAB
INTERNATIONAL NORMALIZATION RATIO, POC: 2.2
PROTHROMBIN TIME, POC: 24.5

## 2023-02-10 PROCEDURE — 85610 PROTHROMBIN TIME: CPT | Performed by: CLINICAL NURSE SPECIALIST

## 2023-03-10 ENCOUNTER — ANTI-COAG VISIT (OUTPATIENT)
Dept: CARDIOLOGY CLINIC | Age: 68
End: 2023-03-10
Payer: MEDICARE

## 2023-03-10 DIAGNOSIS — I48.0 PAF (PAROXYSMAL ATRIAL FIBRILLATION) (HCC): Primary | ICD-10-CM

## 2023-03-10 LAB
INTERNATIONAL NORMALIZATION RATIO, POC: 2.2
PROTHROMBIN TIME, POC: 23.7

## 2023-03-10 PROCEDURE — 85610 PROTHROMBIN TIME: CPT | Performed by: CLINICAL NURSE SPECIALIST

## 2023-03-16 ENCOUNTER — OFFICE VISIT (OUTPATIENT)
Dept: INTERNAL MEDICINE | Facility: CLINIC | Age: 68
End: 2023-03-16
Payer: MEDICARE

## 2023-03-16 VITALS
SYSTOLIC BLOOD PRESSURE: 132 MMHG | DIASTOLIC BLOOD PRESSURE: 72 MMHG | TEMPERATURE: 96.9 F | HEART RATE: 85 BPM | HEIGHT: 74 IN | BODY MASS INDEX: 29.41 KG/M2 | WEIGHT: 229.2 LBS | OXYGEN SATURATION: 98 %

## 2023-03-16 DIAGNOSIS — B35.4 TINEA CORPORIS: Primary | ICD-10-CM

## 2023-03-16 DIAGNOSIS — E11.69 TYPE 2 DIABETES MELLITUS WITH OTHER SPECIFIED COMPLICATION, UNSPECIFIED WHETHER LONG TERM INSULIN USE: ICD-10-CM

## 2023-03-16 DIAGNOSIS — E79.0 ELEVATED URIC ACID IN BLOOD: ICD-10-CM

## 2023-03-16 PROCEDURE — 1159F MED LIST DOCD IN RCRD: CPT

## 2023-03-16 PROCEDURE — 1160F RVW MEDS BY RX/DR IN RCRD: CPT

## 2023-03-16 PROCEDURE — 3075F SYST BP GE 130 - 139MM HG: CPT

## 2023-03-16 PROCEDURE — 3078F DIAST BP <80 MM HG: CPT

## 2023-03-16 PROCEDURE — 3044F HG A1C LEVEL LT 7.0%: CPT

## 2023-03-16 PROCEDURE — 99213 OFFICE O/P EST LOW 20 MIN: CPT

## 2023-03-16 RX ORDER — NYSTATIN 100000 [USP'U]/G
POWDER TOPICAL 2 TIMES DAILY
Qty: 1 EACH | Refills: 2 | Status: SHIPPED | OUTPATIENT
Start: 2023-03-16 | End: 2023-04-03

## 2023-03-16 RX ORDER — ALLOPURINOL 300 MG/1
TABLET ORAL
Qty: 90 TABLET | Refills: 3 | Status: SHIPPED | OUTPATIENT
Start: 2023-03-16 | End: 2023-03-16

## 2023-03-16 RX ORDER — NYSTATIN AND TRIAMCINOLONE ACETONIDE 100000; 1 [USP'U]/G; MG/G
1 OINTMENT TOPICAL 2 TIMES DAILY
Qty: 120 G | Refills: 1 | Status: SHIPPED | OUTPATIENT
Start: 2023-03-16

## 2023-03-16 RX ORDER — ALLOPURINOL 300 MG/1
TABLET ORAL
Qty: 90 TABLET | Refills: 3 | Status: SHIPPED | OUTPATIENT
Start: 2023-03-16

## 2023-03-16 NOTE — PROGRESS NOTES
"Subjective   Jn Cárdenas is a 67 y.o. male.   Chief Complaint   Patient presents with   • yeast infection      Patient states he is here with a yeast infection located on the right crease of the groin area; present for 1 week. Patient states he has been treating with OTC spray and states this has improved symptoms. Patient states the itching comes and goes.        History of Present Illness   Mr. Cárdenas presents here today with complaints of a yeast infection.  He reports that has been present for a little over a week now, he states that initially he thought it was getting worse so he made the appointment, he states he has been using an over-the-counter jock itch spray that seems to be helping with the itching as well as keeping it from spreading or getting any more severe, he has been using it twice daily.  He is also been cleaning it with a \"tube stuff \"that he picked up over-the-counter that was reportedly good for him jock itch as well.  He denies ever having this issue before.  He admits to sweating easily in his groin area, he has been consistent with using his Farxiga which she reports has been on for at least 5 years.  He states that since he broke his right shoulder he has not been quite as active as he normally is, states this is why he has gained some weight and his blood sugars have been running just a little bit higher than usual, tends to run around 120-130 in the morning and stabilizes during the day.  He denies any urinary incontinence, denies any urinary symptoms, denies any diarrhea, denies any other areas of rash.  The following portions of the patient's history were reviewed and updated as appropriate: allergies, current medications, past family history, past medical history, past social history, past surgical history and problem list.    Review of Systems    Objective   Past Medical History:   Diagnosis Date   • Arthritis    • Cataract    • Coronary artery disease    • Diabetes mellitus (HCC)  "   • Disease of thyroid gland    • Gout    • Hypertension    • Myocardial infarct, old     24 yrs ago   • Neuropathy     in feet and legs   • Parotid tumor    • Spastic colon    • TIA (transient ischemic attack)     weakness on left upper extremity      Past Surgical History:   Procedure Laterality Date   • BACK SURGERY      had spinal fusion   • OTHER SURGICAL HISTORY      had goiter removed  at 4 yrs old   • OTHER SURGICAL HISTORY Right     had broken arm,   • PAROTIDECTOMY Left 3/21/2017    Procedure: PAROTID TUMOR EXCISION WITH FACIAL NERVE MONITORING,  DISSECTION LEFT;  Surgeon: Armond Leon MD;  Location: [facility];  Service:         Current Outpatient Medications:   •  allopurinol (ZYLOPRIM) 300 MG tablet, Take 1/2  tablet daily, Disp: 90 tablet, Rfl: 3  •  baclofen (LIORESAL) 10 MG tablet, 1 tablet nightly. May take up to 3 tablets if needed nightly, Disp: 90 tablet, Rfl: 3  •  celecoxib (CeleBREX) 100 MG capsule, Take 1 capsule by mouth once daily, Disp: 90 capsule, Rfl: 3  •  Chromium 200 MCG capsule, Take 200 mcg by mouth 2 (Two) Times a Day., Disp: , Rfl:   •  CloNIDine (CATAPRES) 0.1 MG tablet, Take 1 tablet by mouth Daily., Disp: , Rfl:   •  dapagliflozin Propanediol 10 MG tablet, Take 10 mg by mouth Daily., Disp: 90 tablet, Rfl: 2  •  Diphenhydramine-PSE-APAP (ALLERGY SINUS HEADACHE RELIEF PO), Take 1 tablet by mouth Every Night. May take 1-2 daily for sinus headache, Disp: , Rfl:   •  EQL CINNAMON PO, Take 1,000 mg by mouth Daily., Disp: , Rfl:   •  fluticasone (VERAMYST) 27.5 MCG/SPRAY nasal spray, 2 sprays into the nostril(s) as directed by provider Daily., Disp: , Rfl:   •  gabapentin (NEURONTIN) 100 MG capsule, TAKE 1 TO 3 CAPSULES BY MOUTH NIGHTLY AS NEEDED FOR  NEUROPATHY, Disp: 90 capsule, Rfl: 1  •  glucose blood (OneTouch Ultra) test strip, 1 each by Other route 2 (Two) Times a Day. Use as instructed, Disp: 100 each, Rfl: 3  •  levothyroxine (SYNTHROID, LEVOTHROID) 112 MCG tablet,  "Take 1 tablet by mouth once daily, Disp: 90 tablet, Rfl: 3  •  Liraglutide (Victoza) 18 MG/3ML solution pen-injector injection, Inject 1.2 mg under the skin into the appropriate area as directed Daily., Disp: 3 mL, Rfl: 2  •  lisinopril (PRINIVIL,ZESTRIL) 10 MG tablet, Take 1 tablet by mouth once daily, Disp: 90 tablet, Rfl: 3  •  Multiple Vitamins-Minerals (ALIVE MENS ENERGY) tablet, Take 1 tablet by mouth Daily., Disp: , Rfl:   •  NON FORMULARY, 1 application As Needed (is over the counter rollon applies to  legs prn  max freeze)., Disp: , Rfl:   •  NON FORMULARY, Take 2 tablets by mouth Daily. Healthy feet and nerves, Disp: , Rfl:   •  omeprazole (priLOSEC) 40 MG capsule, Take 1 capsule by mouth Daily., Disp: , Rfl:   •  warfarin (COUMADIN) 5 MG tablet, Take 1 tablet by mouth Daily. Take 7.5mg 3 days weekly, and  10 mg 4 days weekly, Disp: , Rfl:   •  nystatin (MYCOSTATIN) 127909 UNIT/GM powder, Apply  topically to the appropriate area as directed 2 (Two) Times a Day., Disp: 1 each, Rfl: 2  •  nystatin-triamcinolone (MYCOLOG) 819737-3.1 UNIT/GM-% ointment, Apply 1 application topically to the appropriate area as directed 2 (Two) Times a Day., Disp: 120 g, Rfl: 1      /72 (BP Location: Left arm, Patient Position: Sitting, Cuff Size: Adult)   Pulse 85   Temp 96.9 °F (36.1 °C) (Temporal)   Ht 188 cm (74\")   Wt 104 kg (229 lb 3.2 oz)   SpO2 98%   BMI 29.43 kg/m²      Body mass index is 29.43 kg/m².      Physical Exam  Vitals and nursing note reviewed.   Constitutional:       Appearance: Normal appearance. He is obese.   HENT:      Head: Normocephalic and atraumatic.   Eyes:      Extraocular Movements: Extraocular movements intact.      Conjunctiva/sclera: Conjunctivae normal.      Pupils: Pupils are equal, round, and reactive to light.   Cardiovascular:      Rate and Rhythm: Normal rate and regular rhythm.      Pulses: Normal pulses.      Heart sounds: Normal heart sounds.   Pulmonary:      Effort: " Pulmonary effort is normal.      Breath sounds: Normal breath sounds.   Abdominal:      General: Bowel sounds are normal.      Palpations: Abdomen is soft.   Musculoskeletal:         General: Normal range of motion.      Cervical back: Normal range of motion and neck supple.   Skin:     General: Skin is warm.      Findings: Erythema and rash present.          Neurological:      General: No focal deficit present.      Mental Status: He is alert and oriented to person, place, and time. Mental status is at baseline.   Psychiatric:         Mood and Affect: Mood normal.         Behavior: Behavior normal.         Thought Content: Thought content normal.         Judgment: Judgment normal.               Assessment & Plan   Diagnoses and all orders for this visit:    1. Tinea corporis (Primary)  -     nystatin (MYCOSTATIN) 476139 UNIT/GM powder; Apply  topically to the appropriate area as directed 2 (Two) Times a Day.  Dispense: 1 each; Refill: 2  -     nystatin-triamcinolone (MYCOLOG) 107468-7.1 UNIT/GM-% ointment; Apply 1 application topically to the appropriate area as directed 2 (Two) Times a Day.  Dispense: 120 g; Refill: 1    2. Type 2 diabetes mellitus with other specified complication, unspecified whether long term insulin use (HCC)  -     glucose blood (OneTouch Ultra) test strip; 1 each by Other route 2 (Two) Times a Day. Use as instructed  Dispense: 100 each; Refill: 3    3. Elevated uric acid in blood  -     Discontinue: allopurinol (ZYLOPRIM) 300 MG tablet; Take one tablet daily  Dispense: 90 tablet; Refill: 3  -     allopurinol (ZYLOPRIM) 300 MG tablet; Take 1/2  tablet daily  Dispense: 90 tablet; Refill: 3               Plan of care reviewed with Mr. Cárdenas  We will switch his treatment to nystatin powder or cream, advised the powder may help with absorption of the moisture from sweating, but he may prefer the ointment at night.  Area seems to be well contained, he is aware to reach out if symptoms do not improve  or they increase in severity, no need to hold Farxiga at this time.  Continue to maintain good hygiene.  Blood glucose levels slightly higher than usual however not out of control, continue with current medications and lifestyle management, prescription refill sent per request as well as the allopurinol of which he takes 150 mg daily, reports no recurrent issues with gout for the last 4 to 5 years while being maintained on this medication  Keep next scheduled appointment for now, can be seen prior to this as needed.    Please note that portions of this note were completed with a voice recognition program.     Electronically signed by BULMARO De Santiago, 03/16/23, 10:45 CDT.

## 2023-03-28 ENCOUNTER — TELEPHONE (OUTPATIENT)
Dept: CARDIOLOGY CLINIC | Age: 68
End: 2023-03-28

## 2023-03-28 NOTE — TELEPHONE ENCOUNTER
Patient can hold Coumadin prior to procedure. Then go back on Coumadin regular dose and have INR checked that week.

## 2023-03-28 NOTE — TELEPHONE ENCOUNTER
Date: TBD    Cardiologist: Lazarus Moats    Procedure: teeth extraction    Surgeon: Chase Sullivan    Last Office Visit: 9/29/22  Reason for office visit and medical concerns addressed at this office visit: cardiomyopathy, cad, cva, dm, htn, hyperlipidemia, afib,    Testing Performed and Date of Service:  11/7/16 Echo Mitral valve leaflets are mildly thickened with preserved leaflet   mobility. Mild to moderate mitral regurgitation is present. Aortic valve leaflets are moderately thickened. Mild aortic regurgitation is noted. Left ventricular ejection fraction is visually estimated at 49%. Does the patient have a stent? If so, what type?  none    Current Medications: lisinopril, warfarin, omeprazole, gabapentin, clonidine, victoza, allopurinol, baclofen, levothyroxine    Is the patient currently taking an anticoagulant? If so, what is the diagnosis the patient has been given to warrant the need for the anticoagulant?  Warfarin for afib    Additional Notes: requesting to hold warfarin prior to procedure

## 2023-04-03 ENCOUNTER — TELEPHONE (OUTPATIENT)
Dept: INTERNAL MEDICINE | Facility: CLINIC | Age: 68
End: 2023-04-03

## 2023-04-03 ENCOUNTER — OFFICE VISIT (OUTPATIENT)
Dept: INTERNAL MEDICINE | Facility: CLINIC | Age: 68
End: 2023-04-03
Payer: MEDICARE

## 2023-04-03 VITALS
OXYGEN SATURATION: 91 % | BODY MASS INDEX: 29.13 KG/M2 | HEART RATE: 75 BPM | TEMPERATURE: 97.1 F | HEIGHT: 74 IN | WEIGHT: 227 LBS | SYSTOLIC BLOOD PRESSURE: 136 MMHG | DIASTOLIC BLOOD PRESSURE: 88 MMHG

## 2023-04-03 DIAGNOSIS — B35.4 TINEA CORPORIS: ICD-10-CM

## 2023-04-03 DIAGNOSIS — I10 ESSENTIAL HYPERTENSION: ICD-10-CM

## 2023-04-03 DIAGNOSIS — E11.42 TYPE 2 DIABETES MELLITUS WITH DIABETIC POLYNEUROPATHY, WITHOUT LONG-TERM CURRENT USE OF INSULIN: Primary | ICD-10-CM

## 2023-04-03 DIAGNOSIS — Z79.01 CHRONIC ANTICOAGULATION: ICD-10-CM

## 2023-04-03 DIAGNOSIS — Z79.899 ENCOUNTER FOR LONG-TERM (CURRENT) USE OF OTHER MEDICATIONS: ICD-10-CM

## 2023-04-03 DIAGNOSIS — E11.40 DIABETIC NEUROPATHY, PAINFUL: ICD-10-CM

## 2023-04-03 DIAGNOSIS — E78.2 MIXED HYPERLIPIDEMIA: ICD-10-CM

## 2023-04-03 PROBLEM — E78.00 HYPERCHOLESTEREMIA: Status: RESOLVED | Noted: 2020-05-27 | Resolved: 2023-04-03

## 2023-04-03 LAB — HBA1C MFR BLD: 6.3 %

## 2023-04-03 RX ORDER — FLUCONAZOLE 150 MG/1
150 TABLET ORAL ONCE
Qty: 3 TABLET | Refills: 0 | Status: SHIPPED | OUTPATIENT
Start: 2023-04-03 | End: 2023-04-03

## 2023-04-03 RX ORDER — FLUCONAZOLE 150 MG/1
150 TABLET ORAL ONCE
Qty: 1 TABLET | Refills: 0 | Status: SHIPPED | OUTPATIENT
Start: 2023-04-03 | End: 2023-04-03

## 2023-04-03 NOTE — TELEPHONE ENCOUNTER
Chris from [facility]'s pharmacy called saying its throwing a red flag on the Diflucan with him taking Coumadin.   Please advise.

## 2023-04-03 NOTE — TELEPHONE ENCOUNTER
Please inform them I have changed order to one tablet, he has been counseled on risk and is aware to watch for s/s of bleeding.

## 2023-04-03 NOTE — TELEPHONE ENCOUNTER
Caller: Alba Urena Don    Relationship: Self    Best call back number: [phone]    What is the best time to reach you:   ANYTIME     Who are you requesting to speak with (clinical staff, provider,  specific staff member):   CLINICAL STAFF    Do you know the name of the person who called:   ALBA URENA     What was the call regarding:   PATIENT WANTED TO CHECK THE STATUS OF MEDICATION REFILL.     ADVISED HE IS GOING OUT OF TOWN AND WOULD NEED MEDICATION.     Do you require a callback: YES

## 2023-04-03 NOTE — PROGRESS NOTES
"Subjective   Jn Cárdenas is a 67 y.o. male.   Chief Complaint   Patient presents with   • Diabetes     Pt here for follow up    A1c- 6.3%       History of Present Illness   Mr. Cárdenas presents here today for routine 3-month follow-up on management of type 2 diabetes with neuropathy, hypertension, hyperlipidemia, and continued issues with tinea corporis.  He reports that his blood sugars overall have remained in a good range, he continues to remain compliant with use of medications except for he did trial discontinuation of his Farxiga for a few days given the fact that he is still having issues with fungal infection in the right groin/scrotum area.  He states that initially it did improve some however it came back and seems to be worse now.  He continues to use the \"cure fungi \" daily as well as a nystatin powder topically, reports that the cream was not covered by insurance and was very costly.  He continues to follow cardiology for PAF and warfarin management.  Denies any signs or symptoms of bleeding currently.  He states that he is actually on the lower dose currently because he is about to have a dental procedure done around the middle of this month.  Denies any issues with hypertension, no reports of chest pain or shortness of breath.  The following portions of the patient's history were reviewed and updated as appropriate: allergies, current medications, past family history, past medical history, past social history, past surgical history and problem list.    Review of Systems    Objective   Past Medical History:   Diagnosis Date   • Arthritis    • Cataract    • Coronary artery disease    • Diabetes mellitus    • Disease of thyroid gland    • Gout    • Hypertension    • Myocardial infarct, old     24 yrs ago   • Neuropathy     in feet and legs   • Parotid tumor    • Spastic colon    • TIA (transient ischemic attack)     weakness on left upper extremity      Past Surgical History:   Procedure " Laterality Date   • BACK SURGERY      had spinal fusion   • OTHER SURGICAL HISTORY      had goiter removed  at 4 yrs old   • OTHER SURGICAL HISTORY Right     had broken arm,   • PAROTIDECTOMY Left 3/21/2017    Procedure: PAROTID TUMOR EXCISION WITH FACIAL NERVE MONITORING,  DISSECTION LEFT;  Surgeon: Armond Leon MD;  Location: [facility];  Service:         Current Outpatient Medications:   •  allopurinol (ZYLOPRIM) 300 MG tablet, Take 1/2  tablet daily, Disp: 90 tablet, Rfl: 3  •  baclofen (LIORESAL) 10 MG tablet, 1 tablet nightly. May take up to 3 tablets if needed nightly, Disp: 90 tablet, Rfl: 3  •  celecoxib (CeleBREX) 100 MG capsule, Take 1 capsule by mouth once daily, Disp: 90 capsule, Rfl: 3  •  Chromium 200 MCG capsule, Take 200 mcg by mouth 2 (Two) Times a Day., Disp: , Rfl:   •  CloNIDine (CATAPRES) 0.1 MG tablet, Take 1 tablet by mouth Daily., Disp: , Rfl:   •  Diphenhydramine-PSE-APAP (ALLERGY SINUS HEADACHE RELIEF PO), Take 1 tablet by mouth Every Night. May take 1-2 daily for sinus headache, Disp: , Rfl:   •  EQL CINNAMON PO, Take 1,000 mg by mouth Daily., Disp: , Rfl:   •  fluticasone (VERAMYST) 27.5 MCG/SPRAY nasal spray, 2 sprays into the nostril(s) as directed by provider Daily., Disp: , Rfl:   •  gabapentin (NEURONTIN) 100 MG capsule, TAKE 1 TO 3 CAPSULES BY MOUTH NIGHTLY AS NEEDED FOR  NEUROPATHY, Disp: 90 capsule, Rfl: 1  •  glucose blood (OneTouch Ultra) test strip, 1 each by Other route 2 (Two) Times a Day. Use as instructed, Disp: 100 each, Rfl: 3  •  levothyroxine (SYNTHROID, LEVOTHROID) 112 MCG tablet, Take 1 tablet by mouth once daily, Disp: 90 tablet, Rfl: 3  •  Liraglutide (Victoza) 18 MG/3ML solution pen-injector injection, Inject 1.2 mg under the skin into the appropriate area as directed Daily., Disp: 3 mL, Rfl: 2  •  lisinopril (PRINIVIL,ZESTRIL) 10 MG tablet, Take 1 tablet by mouth once daily, Disp: 90 tablet, Rfl: 3  •  Multiple Vitamins-Minerals (ALIVE MENS ENERGY) tablet,  "Take 1 tablet by mouth Daily., Disp: , Rfl:   •  NON FORMULARY, 1 application As Needed (is over the counter rollon applies to  legs prn  max freeze)., Disp: , Rfl:   •  NON FORMULARY, Take 2 tablets by mouth Daily. Healthy feet and nerves, Disp: , Rfl:   •  nystatin-triamcinolone (MYCOLOG) 791412-3.1 UNIT/GM-% ointment, Apply 1 application topically to the appropriate area as directed 2 (Two) Times a Day., Disp: 120 g, Rfl: 1  •  omeprazole (priLOSEC) 40 MG capsule, Take 1 capsule by mouth Daily., Disp: , Rfl:   •  warfarin (COUMADIN) 5 MG tablet, Take 1 tablet by mouth Daily. Take 7.5mg 3 days weekly, and  10 mg 4 days weekly, Disp: , Rfl:   •  dapagliflozin Propanediol 10 MG tablet, Take 10 mg by mouth Daily. (Patient not taking: Reported on 4/3/2023), Disp: 90 tablet, Rfl: 2  •  fluconazole (Diflucan) 150 MG tablet, Take 1 tablet by mouth 1 (One) Time for 1 dose., Disp: 3 tablet, Rfl: 0      /88 (BP Location: Left arm, Patient Position: Sitting, Cuff Size: Adult)   Pulse 75   Temp 97.1 °F (36.2 °C) (Temporal)   Ht 188 cm (74\")   Wt 103 kg (227 lb)   SpO2 91%   BMI 29.15 kg/m²      Body mass index is 29.15 kg/m².         Physical Exam  Vitals and nursing note reviewed.   Constitutional:       General: He is not in acute distress.     Appearance: Normal appearance. He is not ill-appearing, toxic-appearing or diaphoretic.   HENT:      Head: Normocephalic and atraumatic.   Eyes:      Extraocular Movements: Extraocular movements intact.      Conjunctiva/sclera: Conjunctivae normal.      Pupils: Pupils are equal, round, and reactive to light.   Cardiovascular:      Rate and Rhythm: Normal rate and regular rhythm.      Pulses: Normal pulses.      Heart sounds: Normal heart sounds.   Pulmonary:      Effort: Pulmonary effort is normal.      Breath sounds: Normal breath sounds.   Abdominal:      General: Bowel sounds are normal.      Palpations: Abdomen is soft.   Musculoskeletal:         General: Normal range " of motion.      Cervical back: Normal range of motion and neck supple.      Right lower leg: No edema.      Left lower leg: No edema.   Skin:     General: Skin is warm.      Findings: Erythema and rash present.             Comments: Area of fungal growth   Neurological:      General: No focal deficit present.      Mental Status: He is alert and oriented to person, place, and time. Mental status is at baseline.   Psychiatric:         Mood and Affect: Mood normal.         Behavior: Behavior normal.         Thought Content: Thought content normal.         Judgment: Judgment normal.               Assessment & Plan   Diagnoses and all orders for this visit:    1. Type 2 diabetes mellitus with diabetic polyneuropathy, without long-term current use of insulin (Primary)    2. Diabetic neuropathy, painful  -     ToxASSURE Select 13 (MW) - Urine, Clean Catch    3. Tinea corporis  -     fluconazole (Diflucan) 150 MG tablet; Take 1 tablet by mouth 1 (One) Time for 1 dose.  Dispense: 3 tablet; Refill: 0    4. Chronic anticoagulation    5. Encounter for long-term (current) use of other medications  -     ToxASSURE Select 13 (MW) - Urine, Clean Catch  -     POC Glycated Hemoglobin, Total    6. Essential hypertension    7. Mixed hyperlipidemia               Plan of care reviewed with Mr. Cárdenas  Type 2 diabetes management continues to be effective, A1c is currently at 6.3%, we are going to trial a hold of the Farxiga for 2 weeks and give a one-time dose of Diflucan, I did advise that if there is not resolution of the fungal infection to go ahead and take an additional dose after 3 days.  He was cautioned on the fact that this medication can increase the therapeutic effect of his warfarin and to closely monitor for signs or symptoms of bleeding and if they were to occur to report immediately.  I did advise that if the fungal rash does not clear up over the next 2 weeks to immediately let me know.  If there is complete clearance he may  reinitiate the Farxiga in 2 weeks time, if recurrence does occur after reinitiation we will have to consider discontinuation of this medication.  Continue with Victoza at current dosage.  Blood pressure is currently at 136/88, we will continue with current antihypertensive management for now, continue monitoring at home routinely and report readings consistently greater than 130/80.  We will proceed with reassessing hyperlipidemia, BMP and thyroid function with labs that were previously ordered along with a talk screen to update his UDS for use of gabapentin for neuropathy.  He reports compliance with use and denies any negative side effects, typically only takes 1 capsule daily and is not in need of a current refill.  We will communicate results of lab work once available.  He does report completing a diabetic eye exam back in December, I do not see copies of this report in the chart.     Please note that portions of this note were completed with a voice recognition program.     Electronically signed by BULMARO De Santiago, 04/03/23, 12:59 CDT.

## 2023-04-07 ENCOUNTER — TELEPHONE (OUTPATIENT)
Dept: INTERNAL MEDICINE | Facility: CLINIC | Age: 68
End: 2023-04-07

## 2023-04-07 DIAGNOSIS — B35.4 TINEA CORPORIS: Primary | ICD-10-CM

## 2023-04-07 RX ORDER — CLOTRIMAZOLE AND BETAMETHASONE DIPROPIONATE 10; .64 MG/G; MG/G
1 CREAM TOPICAL 2 TIMES DAILY
Qty: 45 G | Refills: 0 | Status: SHIPPED | OUTPATIENT
Start: 2023-04-07 | End: 2023-04-14

## 2023-04-07 NOTE — TELEPHONE ENCOUNTER
Caller: Tootie Cárdenas    Relationship: Emergency Contact    Best call back number: [phone]    What is the best time to reach you:  ANYTIME    Who are you requesting to speak with (clinical staff, provider,  specific staff member):  CLINICAL     What was the call regarding:  WIFE REPORTED THAT PATIENT RECEIVED A PRESCRIPTION FOR 3 PILLS ON 4/3/23 TO TREAT YEAST INFECTION IN GROIN.  WIFE SAID PATIENT ONLY RECEIVED 1 PILL INSTEAD OF 3, & WANTS TO SPEAK WITH CLINICAL ABOUT THIS.  WIFE SAID SHE DOESN'T KNOW IF PHARMACY ONLY ALLOWED  1 PILL BECAUSE THE YEAST INFECTION MED IS A BLOOD THINNER, & PATIENT ALSO TAKES WARFARIN OR IF MEDICARE ONLY APPROVED 1 PILL.  WIFE SAID THE STATIN POWDER PRESCRIBED ABOUT A MONTH AGO DIDN'T HELP PATIENT WITH THE YEAST INFECTION.       ADVISED CALLER TO CHECK WITH PHARMACY ABOUT MEDS THAT MAY BE READY FOR PICKUP.     Do you require a callback:  YES

## 2023-04-07 NOTE — TELEPHONE ENCOUNTER
Caller: Tootie Cárdenas    Relationship: Emergency Contact    Best call back number: [phone]     What is the best time to reach you:  ANY- AVAILABLE AFTER 10:30 AM    Who are you requesting to speak with (clinical staff, provider,  specific staff member): CLINICAL    What was the call regarding:  THE PATIENT'S EMERGENCY CONTACT STATES THAT THE PATIENT WAS ABLE TO TAKE THE FIRST PILL FOR THE YEAST INFECTION, BUT WAS NOT ABLE TO GET THE OTHER TWO PILLS AT THE PHARMACY. THE PATIENT'S EMERGENCY CONTACT STATES THAT THE PATIENT STILL HAS SYMPTOMS FROM THE YEAST INFECTION.    Do you require a callback:YES

## 2023-04-09 LAB
BUN SERPL-MCNC: 28 MG/DL (ref 8–23)
BUN/CREAT SERPL: 22.2 (ref 7–25)
CALCIUM SERPL-MCNC: 10.1 MG/DL (ref 8.6–10.5)
CHLORIDE SERPL-SCNC: 103 MMOL/L (ref 98–107)
CHOLEST SERPL-MCNC: 160 MG/DL (ref 0–200)
CO2 SERPL-SCNC: 28 MMOL/L (ref 22–29)
CREAT SERPL-MCNC: 1.26 MG/DL (ref 0.76–1.27)
DRUGS UR: NORMAL
EGFRCR SERPLBLD CKD-EPI 2021: 62.5 ML/MIN/1.73
GLUCOSE SERPL-MCNC: 108 MG/DL (ref 65–99)
HDLC SERPL-MCNC: 32 MG/DL (ref 40–60)
LDLC SERPL CALC-MCNC: 87 MG/DL (ref 0–100)
POTASSIUM SERPL-SCNC: 4.7 MMOL/L (ref 3.5–5.2)
SODIUM SERPL-SCNC: 142 MMOL/L (ref 136–145)
TRIGL SERPL-MCNC: 243 MG/DL (ref 0–150)
TSH SERPL DL<=0.005 MIU/L-ACNC: 2.83 UIU/ML (ref 0.27–4.2)
VLDLC SERPL CALC-MCNC: 41 MG/DL (ref 5–40)

## 2023-04-10 NOTE — PROGRESS NOTES
BMP shows elevated BUN, I would encourage incorporating more water daily.  Electrolytes in normal range.  Cholesterol shows increase in triglycerides at 243, HDL is still low at 32, I would encourage more healthy fats such as avocado, olive oil, fish, nuts and seeds.  I would incorporate less processed carbohydrates, increase fiber intake.

## 2023-04-20 ENCOUNTER — TELEPHONE (OUTPATIENT)
Dept: INTERNAL MEDICINE | Facility: CLINIC | Age: 68
End: 2023-04-20

## 2023-04-20 DIAGNOSIS — B35.4 TINEA CORPORIS: Primary | ICD-10-CM

## 2023-04-20 RX ORDER — CLOTRIMAZOLE AND BETAMETHASONE DIPROPIONATE 10; .64 MG/G; MG/G
1 CREAM TOPICAL 2 TIMES DAILY
Qty: 120 G | Refills: 3 | Status: SHIPPED | OUTPATIENT
Start: 2023-04-20

## 2023-04-20 NOTE — TELEPHONE ENCOUNTER
Caller: Jn Cárdenas Don    Relationship: Self    Best call back number: [phone]    What is the best time to reach you: ANY    Who are you requesting to speak with (clinical staff, provider,  specific staff member): CLINICAL          What was the call regarding: PCP PUT ON MEDICATION FOR A YEAST INFECTION, HE HAS USED IT ALL, HOWEVER, IT IS BETTER BUT STILL THERE, DOES HE NEED MORE MEDICINE OR DOES HE NEED AN APPOINTMENT.  ALSO, AS DIRECTED BY PCP, HE HAD STOPPED HIS FARXIGA BUT STARTED BACK TODAY.  PLEASE ADVISE.      Do you require a callback:   YES

## 2023-04-21 ENCOUNTER — ANTI-COAG VISIT (OUTPATIENT)
Dept: CARDIOLOGY CLINIC | Age: 68
End: 2023-04-21

## 2023-04-21 DIAGNOSIS — I48.0 PAF (PAROXYSMAL ATRIAL FIBRILLATION) (HCC): Primary | ICD-10-CM

## 2023-04-21 LAB
INTERNATIONAL NORMALIZATION RATIO, POC: 1.2
PROTHROMBIN TIME, POC: 14

## 2023-05-01 ENCOUNTER — OFFICE VISIT (OUTPATIENT)
Dept: INTERNAL MEDICINE | Facility: CLINIC | Age: 68
End: 2023-05-01
Payer: MEDICARE

## 2023-05-01 VITALS
HEART RATE: 73 BPM | DIASTOLIC BLOOD PRESSURE: 84 MMHG | BODY MASS INDEX: 28.7 KG/M2 | WEIGHT: 223.6 LBS | OXYGEN SATURATION: 93 % | TEMPERATURE: 97.1 F | HEIGHT: 74 IN | SYSTOLIC BLOOD PRESSURE: 130 MMHG

## 2023-05-01 DIAGNOSIS — S30.811D EXCORIATION OF ABDOMEN, SUBSEQUENT ENCOUNTER: Primary | ICD-10-CM

## 2023-05-01 DIAGNOSIS — B35.4 TINEA CORPORIS: ICD-10-CM

## 2023-05-01 RX ORDER — CLOTRIMAZOLE AND BETAMETHASONE DIPROPIONATE 10; .64 MG/G; MG/G
1 CREAM TOPICAL 2 TIMES DAILY
Qty: 240 G | Refills: 1 | Status: SHIPPED | OUTPATIENT
Start: 2023-05-01

## 2023-05-01 NOTE — PROGRESS NOTES
Subjective   Jn Cárdenas is a 67 y.o. male.   Chief Complaint   Patient presents with    Yeast Infection     Patient states he has a yeast infection x 3 days. States he is experiencing symptoms of itching and redness. States there are no odors or pain.        History of Present Illness   Mr. Cárdenas returns to the office today for continued issues with tinea corporis.  He was seen several months ago initially for this, initial nystatin cream was ineffective at treating, we did send in Lotrisone cream and had him hold his Farxiga for 2 weeks, he states that symptoms basically cleared up almost 100%, states that he most recently had a dental procedure where he had to take penicillin for 7 days, he states after taking the penicillin it was 100% gone, he did restart his Farxiga 2 weeks ago as instructed, he finished his penicillin last Tuesday and since then states that he has started to have some burning, erythema and scaling in the right groin once again.  He states that his blood sugars have been running a little bit higher than usual for him, he usually likes to have his blood sugar less than 100 however at its highest he has been running around 100 335.  Of note he was on the Farxiga for many years prior to this ever occurring without any problems.  He has not had any urethral discharge burning, no dysuria, no other areas of concern.  There have not been any fevers, lymphadenopathy, night sweats, unexplained weight loss.  Continues to clean the site twice daily and tries to keep it as dry as possible, denies any issues with urinary incontinence.  The following portions of the patient's history were reviewed and updated as appropriate: allergies, current medications, past family history, past medical history, past social history, past surgical history and problem list.    Review of Systems    Objective   Past Medical History:   Diagnosis Date    Arthritis     Cataract     Coronary artery disease     Diabetes  mellitus     Disease of thyroid gland     Gout     Hypertension     Myocardial infarct, old     24 yrs ago    Neuropathy     in feet and legs    Parotid tumor     Spastic colon     TIA (transient ischemic attack)     weakness on left upper extremity      Past Surgical History:   Procedure Laterality Date    BACK SURGERY      had spinal fusion    OTHER SURGICAL HISTORY      had goiter removed  at 4 yrs old    OTHER SURGICAL HISTORY Right     had broken arm,    PAROTIDECTOMY Left 3/21/2017    Procedure: PAROTID TUMOR EXCISION WITH FACIAL NERVE MONITORING,  DISSECTION LEFT;  Surgeon: Armond Leon MD;  Location: [facility] OR;  Service:         Current Outpatient Medications:     allopurinol (ZYLOPRIM) 300 MG tablet, Take 1/2  tablet daily, Disp: 90 tablet, Rfl: 3    baclofen (LIORESAL) 10 MG tablet, 1 tablet nightly. May take up to 3 tablets if needed nightly, Disp: 90 tablet, Rfl: 3    celecoxib (CeleBREX) 100 MG capsule, Take 1 capsule by mouth once daily, Disp: 90 capsule, Rfl: 3    Chromium 200 MCG capsule, Take 200 mcg by mouth 2 (Two) Times a Day., Disp: , Rfl:     CloNIDine (CATAPRES) 0.1 MG tablet, Take 1 tablet by mouth Daily., Disp: , Rfl:     clotrimazole-betamethasone (Lotrisone) 1-0.05 % cream, Apply 1 application topically to the appropriate area as directed 2 (Two) Times a Day., Disp: 240 g, Rfl: 1    dapagliflozin Propanediol 10 MG tablet, Take 10 mg by mouth Daily., Disp: 90 tablet, Rfl: 2    Diphenhydramine-PSE-APAP (ALLERGY SINUS HEADACHE RELIEF PO), Take 1 tablet by mouth Every Night. May take 1-2 daily for sinus headache, Disp: , Rfl:     EQL CINNAMON PO, Take 1,000 mg by mouth Daily., Disp: , Rfl:     fluticasone (VERAMYST) 27.5 MCG/SPRAY nasal spray, 2 sprays into the nostril(s) as directed by provider Daily., Disp: , Rfl:     gabapentin (NEURONTIN) 100 MG capsule, TAKE 1 TO 3 CAPSULES BY MOUTH NIGHTLY AS NEEDED FOR  NEUROPATHY, Disp: 90 capsule, Rfl: 1    glucose blood (OneTouch Ultra) test  "strip, 1 each by Other route 2 (Two) Times a Day. Use as instructed, Disp: 100 each, Rfl: 3    levothyroxine (SYNTHROID, LEVOTHROID) 112 MCG tablet, Take 1 tablet by mouth once daily, Disp: 90 tablet, Rfl: 3    Liraglutide (Victoza) 18 MG/3ML solution pen-injector injection, Inject 1.2 mg under the skin into the appropriate area as directed Daily., Disp: 3 mL, Rfl: 2    lisinopril (PRINIVIL,ZESTRIL) 10 MG tablet, Take 1 tablet by mouth once daily, Disp: 90 tablet, Rfl: 3    Multiple Vitamins-Minerals (ALIVE MENS ENERGY) tablet, Take 1 tablet by mouth Daily., Disp: , Rfl:     NON FORMULARY, 1 application As Needed (is over the counter rollon applies to  legs prn  max freeze)., Disp: , Rfl:     NON FORMULARY, Take 2 tablets by mouth Daily. Healthy feet and nerves, Disp: , Rfl:     omeprazole (priLOSEC) 40 MG capsule, Take 1 capsule by mouth Daily., Disp: , Rfl:     warfarin (COUMADIN) 5 MG tablet, Take 1 tablet by mouth Daily. Take 7.5mg 3 days weekly, and  10 mg 4 days weekly, Disp: , Rfl:       /84 (BP Location: Left arm, Patient Position: Sitting, Cuff Size: Adult)   Pulse 73   Temp 97.1 °F (36.2 °C) (Temporal)   Ht 188 cm (74\")   Wt 101 kg (223 lb 9.6 oz)   SpO2 93%   BMI 28.71 kg/m²      Body mass index is 28.71 kg/m².         Physical Exam  Vitals and nursing note reviewed.   Constitutional:       General: He is not in acute distress.     Appearance: Normal appearance. He is obese. He is not ill-appearing, toxic-appearing or diaphoretic.   HENT:      Head: Normocephalic and atraumatic.   Eyes:      Pupils: Pupils are equal, round, and reactive to light.   Cardiovascular:      Rate and Rhythm: Normal rate and regular rhythm.      Pulses: Normal pulses.      Heart sounds: Normal heart sounds.   Pulmonary:      Effort: Pulmonary effort is normal.      Breath sounds: Normal breath sounds.   Musculoskeletal:         General: Normal range of motion.      Cervical back: Normal range of motion and neck " supple.   Skin:     General: Skin is warm and dry.      Findings: Rash present. Rash is scaling.             Comments: No drainage is noted, erythema and area marked with scaling/dry skin, appears fungal, there is no streaking, swelling, or pain reported.   Neurological:      General: No focal deficit present.      Mental Status: He is alert and oriented to person, place, and time. Mental status is at baseline.   Psychiatric:         Mood and Affect: Mood normal.         Behavior: Behavior normal.         Thought Content: Thought content normal.         Judgment: Judgment normal.             Assessment & Plan   Diagnoses and all orders for this visit:    1. Excoriation of abdomen, subsequent encounter (Primary)  -     Anaerobic & Aerobic Culture ([facility] Only) - Swab, Groin, right    2. Tinea corporis  -     clotrimazole-betamethasone (Lotrisone) 1-0.05 % cream; Apply 1 application topically to the appropriate area as directed 2 (Two) Times a Day.  Dispense: 240 g; Refill: 1               Plan of care reviewed with Mr. Cárdenas  There are some concern for bacterial involvement given reports of the rash clearing up completely post penicillin for orthodontic procedure.  We will proceed with swabbing today, for some reason was not eligible for refill of the Lotrisone cream until May 4, he explains that that is what the pharmacist told him.  We will proceed with sending an additional prescription of this but with a higher amount so that he can have some on hand for as needed.  At this time I do not want to withhold any additional Farxiga as he is not having any urinary issues.  We will withhold from any additional oral Diflucan given his warfarin and a strong interaction with the medication.  He will treat topically with the Lotrisone cream and if indicated we will consider antibiotics.  In the meantime he will also continue with good hygiene as he has been.  If symptoms change he is to let me know.  Keep next scheduled  appointment, prior to this as needed.        Please note that portions of this note were completed with a voice recognition program.     Electronically signed by BULMARO De Santiago, 05/01/23, 15:09 CDT.

## 2023-05-05 ENCOUNTER — ANTI-COAG VISIT (OUTPATIENT)
Dept: CARDIOLOGY CLINIC | Age: 68
End: 2023-05-05

## 2023-05-05 DIAGNOSIS — L30.9 DERMATITIS: Primary | ICD-10-CM

## 2023-05-05 DIAGNOSIS — I48.0 PAF (PAROXYSMAL ATRIAL FIBRILLATION) (HCC): Primary | ICD-10-CM

## 2023-05-05 LAB
BACTERIA SPEC AEROBE CULT: ABNORMAL
BACTERIA SPEC ANAEROBE CULT: ABNORMAL
BACTERIA SPEC CULT: ABNORMAL
INTERNATIONAL NORMALIZATION RATIO, POC: 2.4
OTHER ANTIBIOTIC SUSC ISLT: ABNORMAL
PROTHROMBIN TIME, POC: 26.1

## 2023-05-05 RX ORDER — AMOXICILLIN AND CLAVULANATE POTASSIUM 875; 125 MG/1; MG/1
1 TABLET, FILM COATED ORAL 2 TIMES DAILY
Qty: 14 TABLET | Refills: 0 | Status: SHIPPED | OUTPATIENT
Start: 2023-05-05 | End: 2023-05-12

## 2023-05-23 DIAGNOSIS — G25.81 RESTLESS LEG SYNDROME: ICD-10-CM

## 2023-05-25 RX ORDER — BACLOFEN 10 MG/1
TABLET ORAL
Qty: 90 TABLET | Refills: 3 | Status: SHIPPED | OUTPATIENT
Start: 2023-05-25

## 2023-06-06 RX ORDER — LIRAGLUTIDE 6 MG/ML
1.2 INJECTION SUBCUTANEOUS DAILY
Qty: 9 ML | Refills: 3 | Status: SHIPPED | OUTPATIENT
Start: 2023-06-06

## 2023-07-07 PROBLEM — N18.31 STAGE 3A CHRONIC KIDNEY DISEASE: Status: ACTIVE | Noted: 2023-07-07

## 2023-07-19 ENCOUNTER — OFFICE VISIT (OUTPATIENT)
Dept: CARDIOLOGY CLINIC | Age: 68
End: 2023-07-19
Payer: MEDICARE

## 2023-07-19 VITALS
WEIGHT: 222 LBS | BODY MASS INDEX: 28.49 KG/M2 | SYSTOLIC BLOOD PRESSURE: 122 MMHG | HEIGHT: 74 IN | HEART RATE: 64 BPM | DIASTOLIC BLOOD PRESSURE: 88 MMHG

## 2023-07-19 DIAGNOSIS — I25.10 CORONARY ARTERY DISEASE INVOLVING NATIVE CORONARY ARTERY OF NATIVE HEART WITHOUT ANGINA PECTORIS: ICD-10-CM

## 2023-07-19 DIAGNOSIS — I48.0 PAF (PAROXYSMAL ATRIAL FIBRILLATION) (HCC): Primary | ICD-10-CM

## 2023-07-19 DIAGNOSIS — I10 ESSENTIAL HYPERTENSION: ICD-10-CM

## 2023-07-19 LAB
INTERNATIONAL NORMALIZATION RATIO, POC: 1.6
PROTHROMBIN TIME, POC: 17.5

## 2023-07-19 PROCEDURE — 99214 OFFICE O/P EST MOD 30 MIN: CPT | Performed by: INTERNAL MEDICINE

## 2023-07-19 PROCEDURE — G8417 CALC BMI ABV UP PARAM F/U: HCPCS | Performed by: INTERNAL MEDICINE

## 2023-07-19 PROCEDURE — 1036F TOBACCO NON-USER: CPT | Performed by: INTERNAL MEDICINE

## 2023-07-19 PROCEDURE — 3074F SYST BP LT 130 MM HG: CPT | Performed by: INTERNAL MEDICINE

## 2023-07-19 PROCEDURE — G8427 DOCREV CUR MEDS BY ELIG CLIN: HCPCS | Performed by: INTERNAL MEDICINE

## 2023-07-19 PROCEDURE — 3079F DIAST BP 80-89 MM HG: CPT | Performed by: INTERNAL MEDICINE

## 2023-07-19 PROCEDURE — 1123F ACP DISCUSS/DSCN MKR DOCD: CPT | Performed by: INTERNAL MEDICINE

## 2023-07-19 PROCEDURE — 93793 ANTICOAG MGMT PT WARFARIN: CPT | Performed by: INTERNAL MEDICINE

## 2023-07-19 PROCEDURE — 3017F COLORECTAL CA SCREEN DOC REV: CPT | Performed by: INTERNAL MEDICINE

## 2023-07-19 RX ORDER — CELECOXIB 100 MG/1
100 CAPSULE ORAL DAILY
COMMUNITY

## 2023-07-19 ASSESSMENT — ENCOUNTER SYMPTOMS
ABDOMINAL PAIN: 0
VOMITING: 0
BACK PAIN: 0
SHORTNESS OF BREATH: 0
DIARRHEA: 0
COUGH: 0
WHEEZING: 0
ABDOMINAL DISTENTION: 0
BLOOD IN STOOL: 0

## 2023-07-19 NOTE — PROGRESS NOTES
years, history of prior TIAs. 3.  Diabetes mellitus type 2.  3.  Hypertension. PRESENTATION: Scot Membreno is a 79y.o. year old male presents for evaluation. For the most part he has been doing fairly well with no significant new symptomatologies. Denies any chest pain. Does get episodes when he feels lightheaded but does not pass out and was told this is related to his blood sugar. Does get frequent bouts of palpitation which she describes as fast beating of his heart which do not last hours but few minutes at a time. No leg swelling. No shortness of breath reported. REVIEW OF SYSTEMS:  Review of Systems   Constitutional:  Negative for activity change, diaphoresis and fatigue. HENT:  Negative for hearing loss, nosebleeds and tinnitus. Eyes:  Negative for visual disturbance. Respiratory:  Negative for cough, shortness of breath and wheezing. Cardiovascular:  Negative for chest pain, palpitations and leg swelling. Gastrointestinal:  Negative for abdominal distention, abdominal pain, blood in stool, diarrhea and vomiting. Endocrine: Negative for cold intolerance, heat intolerance, polydipsia, polyphagia and polyuria. Genitourinary:  Negative for difficulty urinating, flank pain and hematuria. Musculoskeletal:  Negative for arthralgias, back pain, joint swelling and myalgias. Skin:  Negative for pallor and rash. Neurological:  Negative for dizziness, seizures, syncope and headaches. Psychiatric/Behavioral:  Negative for behavioral problems and dysphoric mood. The patient is not nervous/anxious. Past Medical History:      Diagnosis Date    CAD (coronary artery disease)     Cardiomyopathy ([address]) 6/23/2011    Diabetes mellitus ([address] [facility])     Type 2    Gout 2/8/2012    Hypercholesteremia     Hyperlipidemia     Hypertension     MI (myocardial infarction) ([address])     Other chest pain     history of conversion reaction.     Other primary cardiomyopathies     Syncope 2/8/2012    history of

## 2023-07-28 ENCOUNTER — ANTI-COAG VISIT (OUTPATIENT)
Dept: CARDIOLOGY CLINIC | Age: 68
End: 2023-07-28
Payer: MEDICARE

## 2023-07-28 DIAGNOSIS — I48.0 PAF (PAROXYSMAL ATRIAL FIBRILLATION) (HCC): Primary | ICD-10-CM

## 2023-07-28 LAB
INTERNATIONAL NORMALIZATION RATIO, POC: 2.3
PROTHROMBIN TIME, POC: 24.7

## 2023-07-28 PROCEDURE — 93793 ANTICOAG MGMT PT WARFARIN: CPT | Performed by: NURSE PRACTITIONER

## 2023-08-14 RX ORDER — WARFARIN SODIUM 5 MG/1
TABLET ORAL
Qty: 180 TABLET | Refills: 0 | Status: SHIPPED | OUTPATIENT
Start: 2023-08-14

## 2023-09-01 ENCOUNTER — ANTI-COAG VISIT (OUTPATIENT)
Dept: CARDIOLOGY CLINIC | Age: 68
End: 2023-09-01
Payer: MEDICARE

## 2023-09-01 DIAGNOSIS — I48.0 PAF (PAROXYSMAL ATRIAL FIBRILLATION) (HCC): Primary | ICD-10-CM

## 2023-09-01 LAB
INTERNATIONAL NORMALIZATION RATIO, POC: 2.3
PROTHROMBIN TIME, POC: 24

## 2023-09-01 PROCEDURE — 93793 ANTICOAG MGMT PT WARFARIN: CPT | Performed by: CLINICAL NURSE SPECIALIST

## 2023-09-04 DIAGNOSIS — G25.81 RESTLESS LEG SYNDROME: ICD-10-CM

## 2023-09-05 RX ORDER — BACLOFEN 10 MG/1
TABLET ORAL
Qty: 90 TABLET | Refills: 2 | Status: SHIPPED | OUTPATIENT
Start: 2023-09-05

## 2023-09-13 DIAGNOSIS — E11.69 TYPE 2 DIABETES MELLITUS WITH OTHER SPECIFIED COMPLICATION, UNSPECIFIED WHETHER LONG TERM INSULIN USE: ICD-10-CM

## 2023-09-13 RX ORDER — BLOOD SUGAR DIAGNOSTIC
STRIP MISCELLANEOUS
Qty: 100 EACH | Refills: 0 | Status: SHIPPED | OUTPATIENT
Start: 2023-09-13

## 2023-09-27 ENCOUNTER — HOSPITAL ENCOUNTER (OUTPATIENT)
Dept: NON INVASIVE DIAGNOSTICS | Age: 68
Discharge: HOME | End: 2023-09-27
Attending: INTERNAL MEDICINE
Payer: MEDICARE

## 2023-09-27 ENCOUNTER — HOSPITAL ENCOUNTER (OUTPATIENT)
Dept: NUCLEAR MEDICINE | Age: 68
Discharge: HOME | End: 2023-09-29
Attending: INTERNAL MEDICINE
Payer: MEDICARE

## 2023-09-27 DIAGNOSIS — I48.0 PAF (PAROXYSMAL ATRIAL FIBRILLATION) (HCC): ICD-10-CM

## 2023-09-27 DIAGNOSIS — I10 ESSENTIAL HYPERTENSION: ICD-10-CM

## 2023-09-27 DIAGNOSIS — I25.10 CORONARY ARTERY DISEASE INVOLVING NATIVE CORONARY ARTERY OF NATIVE HEART WITHOUT ANGINA PECTORIS: ICD-10-CM

## 2023-09-27 PROCEDURE — 3430000000 HC RX DIAGNOSTIC RADIOPHARMACEUTICAL: Performed by: INTERNAL MEDICINE

## 2023-09-27 PROCEDURE — 93017 CV STRESS TEST TRACING ONLY: CPT

## 2023-09-27 PROCEDURE — A9502 TC99M TETROFOSMIN: HCPCS | Performed by: INTERNAL MEDICINE

## 2023-09-27 PROCEDURE — 6360000002 HC RX W HCPCS: Performed by: INTERNAL MEDICINE

## 2023-09-27 PROCEDURE — 93306 TTE W/DOPPLER COMPLETE: CPT

## 2023-09-27 RX ORDER — REGADENOSON 0.08 MG/ML
0.4 INJECTION, SOLUTION INTRAVENOUS
Status: COMPLETED | OUTPATIENT
Start: 2023-09-27 | End: 2023-09-27

## 2023-09-27 RX ADMIN — TETROFOSMIN 8 MILLICURIE: 1.38 INJECTION, POWDER, LYOPHILIZED, FOR SOLUTION INTRAVENOUS at 12:44

## 2023-09-27 RX ADMIN — REGADENOSON 0.4 MG: 0.08 INJECTION, SOLUTION INTRAVENOUS at 11:45

## 2023-09-27 RX ADMIN — TETROFOSMIN 24 MILLICURIE: 1.38 INJECTION, POWDER, LYOPHILIZED, FOR SOLUTION INTRAVENOUS at 12:44

## 2023-09-29 ENCOUNTER — TELEPHONE (OUTPATIENT)
Dept: CARDIOLOGY CLINIC | Age: 68
End: 2023-09-29

## 2023-09-29 NOTE — TELEPHONE ENCOUNTER
----- Message from Silvio Humphrey MD sent at 9/28/2023  6:52 PM CDT -----  Please let patient know that his echo and his stress test did not show any significant abnormality. Heart function appears preserved.

## 2023-10-16 ENCOUNTER — OFFICE VISIT (OUTPATIENT)
Dept: INTERNAL MEDICINE | Facility: CLINIC | Age: 68
End: 2023-10-16
Payer: MEDICARE

## 2023-10-16 VITALS
HEART RATE: 59 BPM | OXYGEN SATURATION: 98 % | HEIGHT: 74 IN | DIASTOLIC BLOOD PRESSURE: 76 MMHG | TEMPERATURE: 97.3 F | BODY MASS INDEX: 29.34 KG/M2 | WEIGHT: 228.6 LBS | SYSTOLIC BLOOD PRESSURE: 128 MMHG

## 2023-10-16 DIAGNOSIS — I10 ESSENTIAL HYPERTENSION: ICD-10-CM

## 2023-10-16 DIAGNOSIS — Z12.5 SCREENING PSA (PROSTATE SPECIFIC ANTIGEN): ICD-10-CM

## 2023-10-16 DIAGNOSIS — E03.9 ACQUIRED HYPOTHYROIDISM: ICD-10-CM

## 2023-10-16 DIAGNOSIS — E11.649 TYPE 2 DIABETES MELLITUS WITH HYPOGLYCEMIA WITHOUT COMA, WITHOUT LONG-TERM CURRENT USE OF INSULIN: ICD-10-CM

## 2023-10-16 DIAGNOSIS — N18.31 STAGE 3A CHRONIC KIDNEY DISEASE: ICD-10-CM

## 2023-10-16 DIAGNOSIS — E78.2 MIXED HYPERLIPIDEMIA: ICD-10-CM

## 2023-10-16 DIAGNOSIS — Z23 NEED FOR INFLUENZA VACCINATION: ICD-10-CM

## 2023-10-16 DIAGNOSIS — M62.838 MUSCLE SPASM: ICD-10-CM

## 2023-10-16 DIAGNOSIS — I48.0 PAF (PAROXYSMAL ATRIAL FIBRILLATION): ICD-10-CM

## 2023-10-16 DIAGNOSIS — I25.10 CORONARY ARTERY DISEASE INVOLVING NATIVE CORONARY ARTERY OF NATIVE HEART WITHOUT ANGINA PECTORIS: ICD-10-CM

## 2023-10-16 LAB
EXPIRATION DATE: ABNORMAL
HBA1C MFR BLD: 6.3 % (ref 4.5–5.7)
Lab: ABNORMAL

## 2023-10-16 RX ORDER — SEMAGLUTIDE 0.68 MG/ML
0.5 INJECTION, SOLUTION SUBCUTANEOUS WEEKLY
Qty: 3 ML | Refills: 3 | Status: SHIPPED | OUTPATIENT
Start: 2023-10-16

## 2023-10-16 NOTE — PROGRESS NOTES
Subjective   Jn Cárdenas is a 68 y.o. male.   Chief Complaint   Patient presents with    Hypertension     3 month f/u  Denies chest pains and SOB.    Mixed Hyperlipidemia     3 month f/u    Diabetes     3 month f/u;  A1c - 6.3%    Rash     Patient complains of a reoccurrence of a red rash located in the groin area.        History of Present Illness   Mr. Cárdenas presents to the office today for routine 3-month follow-up on management of hypertension, hyperlipidemia, CKD, type 2 diabetes and recurrent rash.  He reports switching from the Victoza to the Ozempic 1 mg weekly has been beneficial and that he only has to inject himself once weekly however he feels like the Ozempic is stronger because he has had more issues with hypoglycemic episodes.  He states that they typically occur very suddenly and usually he does not have enough time to check his blood sugar and instead he gets a stack.  He states that sometimes it takes several hours for him to recover from this.  He also notes that the chronic intermittent rash that he has had in his groin area has recurred, he has been putting on the topical Lotrisone ointment and this has helped it.  He states he also did hold his Farxiga for about 1 week and he feels like this is really what caused it to start improving.  He has had this happen multiple times now over the last year, typically does improve with the topical application and holding the Farxiga.  He denies any urinary issues.  He denies any significant signs that would indicate that he has not been drinking of water except for the fact that he has been having increased intensity and severity of muscle spasms in lower and upper extremities as well as abdominal wall.  He notes that one of the muscle spasms was so severe not too long ago that his legs gave out underneath of him and he did hurt his right elbow.  He reports blood pressures have been normotensive at home, no shortness of breath or chest pain.  He follows  up with cardiology he believes later this week, he states that they went ahead and did a nuclear med stress test as well as an echocardiogram of his heart and Holter monitor since they somehow lost some of his records from previous evaluations, he has not yet been notified of the results.  He recalls wearing a Holter monitor for the duration that they asked without having any issues, he states that he turned today and he had an A-fib spell.  He continues to maintain on warfarin, denies any bleeding issues or concerns.  He follows with Dr. Nemesio felton at [facility].    The following portions of the patient's history were reviewed and updated as appropriate: allergies, curr.ent medications, past family history, past medical history, past social history, past surgical history and problem list.    Review of Systems    Objective   Past Medical History:   Diagnosis Date    Arthritis     Cataract     Coronary artery disease     Diabetes mellitus     Disease of thyroid gland     Gout     Hypertension     Myocardial infarct, old     24 yrs ago    Neuropathy     in feet and legs    Parotid tumor     Spastic colon     TIA (transient ischemic attack)     weakness on left upper extremity      Past Surgical History:   Procedure Laterality Date    BACK SURGERY      had spinal fusion    OTHER SURGICAL HISTORY      had goiter removed  at 4 yrs old    OTHER SURGICAL HISTORY Right     had broken arm,    PAROTIDECTOMY Left 3/21/2017    Procedure: PAROTID TUMOR EXCISION WITH FACIAL NERVE MONITORING,  DISSECTION LEFT;  Surgeon: Armond Leon MD;  Location: [facility] OR;  Service:         Current Outpatient Medications:     allopurinol (ZYLOPRIM) 300 MG tablet, Take 1/2  tablet daily, Disp: 90 tablet, Rfl: 3    baclofen (LIORESAL) 10 MG tablet, TAKE 1 TABLET BY MOUTH NIGHTLY. MAY TAKE UP TO 3 TABLETS IF NEEDED NIGHTLY, Disp: 90 tablet, Rfl: 2    celecoxib (CeleBREX) 100 MG capsule, Take 1 capsule by mouth once daily, Disp: 90 capsule,  Rfl: 3    Chromium 200 MCG capsule, Take 200 mcg by mouth 2 (Two) Times a Day., Disp: , Rfl:     CloNIDine (CATAPRES) 0.1 MG tablet, Take 1 tablet by mouth Daily., Disp: , Rfl:     clotrimazole-betamethasone (Lotrisone) 1-0.05 % cream, Apply 1 application topically to the appropriate area as directed 2 (Two) Times a Day., Disp: 240 g, Rfl: 1    dapagliflozin Propanediol 10 MG tablet, Take 10 mg by mouth Daily. (Patient taking differently: Take 5 mg by mouth Daily.), Disp: 90 tablet, Rfl: 2    Diphenhydramine-PSE-APAP (ALLERGY SINUS HEADACHE RELIEF PO), Take 1 tablet by mouth Every Night. May take 1-2 daily for sinus headache, Disp: , Rfl:     EQL CINNAMON PO, Take 1,000 mg by mouth Daily., Disp: , Rfl:     fluticasone (VERAMYST) 27.5 MCG/SPRAY nasal spray, 2 sprays into the nostril(s) as directed by provider Daily., Disp: , Rfl:     gabapentin (NEURONTIN) 100 MG capsule, TAKE 1 TO 3 CAPSULES BY MOUTH NIGHTLY AS NEEDED FOR NEUROPATHY, Disp: 90 capsule, Rfl: 1    levothyroxine (SYNTHROID, LEVOTHROID) 112 MCG tablet, Take 1 tablet by mouth once daily, Disp: 90 tablet, Rfl: 3    lisinopril (PRINIVIL,ZESTRIL) 10 MG tablet, Take 1 tablet by mouth once daily, Disp: 90 tablet, Rfl: 3    Multiple Vitamins-Minerals (ALIVE MENS ENERGY) tablet, Take 1 tablet by mouth Daily., Disp: , Rfl:     NON FORMULARY, 1 application As Needed (is over the counter rollon applies to  legs prn  max freeze)., Disp: , Rfl:     NON FORMULARY, Take 2 tablets by mouth Daily. Healthy feet and nerves, Disp: , Rfl:     omeprazole (priLOSEC) 40 MG capsule, Take 1 capsule by mouth Daily., Disp: , Rfl:     OneTouch Ultra test strip, USE 1 TEST STRIP TO CHECK BLOOD SUGAR 2 TIMES  A DAY AS INSTRUCTED., Disp: 100 each, Rfl: 0    warfarin (COUMADIN) 5 MG tablet, Take 1 tablet by mouth Daily. Take 7.5mg 3 days weekly, and  10 mg 4 days weekly, Disp: , Rfl:     Semaglutide,0.25 or 0.5MG/DOS, (Ozempic, 0.25 or 0.5 MG/DOSE,) 2 MG/3ML solution pen-injector,  "Inject 0.5 mg under the skin into the appropriate area as directed 1 (One) Time Per Week., Disp: 3 mL, Rfl: 3      /76 (BP Location: Right arm, Patient Position: Sitting, Cuff Size: Adult)   Pulse 59   Temp 97.3 °F (36.3 °C) (Temporal)   Ht 188 cm (74.02\")   Wt 104 kg (228 lb 9.6 oz)   SpO2 98%   BMI 29.33 kg/m²      Body mass index is 29.33 kg/m².          Physical Exam  Vitals and nursing note reviewed.   Constitutional:       General: He is not in acute distress.     Appearance: Normal appearance. He is overweight. He is not ill-appearing, toxic-appearing or diaphoretic.   HENT:      Head: Normocephalic and atraumatic.   Eyes:      Extraocular Movements: Extraocular movements intact.      Conjunctiva/sclera: Conjunctivae normal.      Pupils: Pupils are equal, round, and reactive to light.   Cardiovascular:      Rate and Rhythm: Normal rate and regular rhythm.      Pulses: Normal pulses.      Heart sounds: Normal heart sounds.   Pulmonary:      Effort: Pulmonary effort is normal.      Breath sounds: Normal breath sounds.   Abdominal:      General: Bowel sounds are normal.      Palpations: Abdomen is soft.   Musculoskeletal:         General: Normal range of motion.      Cervical back: Normal range of motion and neck supple.      Right lower leg: No edema.      Left lower leg: No edema.   Skin:     General: Skin is warm and dry.      Findings: Rash (groin) present.   Neurological:      General: No focal deficit present.      Mental Status: He is alert and oriented to person, place, and time. Mental status is at baseline.      Motor: No weakness.      Gait: Gait normal.   Psychiatric:         Mood and Affect: Mood normal.         Behavior: Behavior normal.         Thought Content: Thought content normal.         Judgment: Judgment normal.               Assessment & Plan   Diagnoses and all orders for this visit:    1. Coronary artery disease involving native coronary artery of native heart without angina " pectoris    2. PAF (paroxysmal atrial fibrillation)    3. Essential hypertension  -     Basic metabolic panel    4. Mixed hyperlipidemia    5. Type 2 diabetes mellitus with hypoglycemia without coma, without long-term current use of insulin  -     Basic metabolic panel  -     POC Glycosylated Hemoglobin (Hb A1C)  -     Semaglutide,0.25 or 0.5MG/DOS, (Ozempic, 0.25 or 0.5 MG/DOSE,) 2 MG/3ML solution pen-injector; Inject 0.5 mg under the skin into the appropriate area as directed 1 (One) Time Per Week.  Dispense: 3 mL; Refill: 3    6. Acquired hypothyroidism    7. Stage 3a chronic kidney disease    8. Screening PSA (prostate specific antigen)  -     PSA SCREENING    9. Muscle spasm  -     Basic metabolic panel  -     Magnesium    10. Need for influenza vaccination  -     Fluzone High-Dose 65+yrs               Plan of care reviewed with Mr. Cárdenas  Given reports of hypoglycemia and his A1c still being in a very controlled range at 6.3% we are going to decrease his Ozempic to 0.5 mg weekly.  We are also going to do a 3-week trial of cutting his Farxiga down to 5 mg to see if this helps clear up the rash and mabel.  There are outbreaks as this seems to be the common denominator.  If there is no further improvement in the rash we may need to consider completely discontinuing the medication, this needs to also be discussed with cardiology as I do believe that they are the ones who initially prescribed in the Farxiga.  Encouraged to maintain consistent carbohydrate low-cholesterol diet, maintain adequate level of physical activity.  Blood pressure today is 128/76 indicating good control, continue with current measures for hypertension  Recheck BMP today along with PSA screening which was not done on previous labs, denies any genitourinary issues today.  Continue with current dosage of levothyroxine (112 mcg/daily) for management of hypothyroidism  We will add a magnesium level along with a BMP for assessment of electrolyte  deficiencies that could explain increased incidence of muscle spasms.  He has been encouraged to drink a light or sugar-free electrolyte drink 8 ounces daily to see if this helps.  Recently completed Holter monitor, echocardiogram and nuclear med stress test, results were reviewed nothing significantly abnormal noted, Holter report is not available yet.  He denies any issues with current warfarin therapy.  He has been encouraged to reach out with any further issues or concerns, no need to wait till 3-month follow-up if still experiencing any hypoglycemia or recurrent excoriation/rash in groin area.    Please note that portions of this note were completed with a voice recognition program.     Electronically signed by BULMARO De Santiago, 10/16/23, 09:42 CDT.

## 2023-10-17 LAB
BUN SERPL-MCNC: 20 MG/DL (ref 8–23)
BUN/CREAT SERPL: 19.8 (ref 7–25)
CALCIUM SERPL-MCNC: 9.4 MG/DL (ref 8.6–10.5)
CHLORIDE SERPL-SCNC: 103 MMOL/L (ref 98–107)
CO2 SERPL-SCNC: 27.9 MMOL/L (ref 22–29)
CREAT SERPL-MCNC: 1.01 MG/DL (ref 0.76–1.27)
EGFRCR SERPLBLD CKD-EPI 2021: 81 ML/MIN/1.73
GLUCOSE SERPL-MCNC: 131 MG/DL (ref 65–99)
MAGNESIUM SERPL-MCNC: 2.4 MG/DL (ref 1.6–2.4)
POTASSIUM SERPL-SCNC: 4.3 MMOL/L (ref 3.5–5.2)
PSA SERPL-MCNC: 0.43 NG/ML (ref 0–4)
SODIUM SERPL-SCNC: 140 MMOL/L (ref 136–145)

## 2023-10-17 NOTE — PROGRESS NOTES
Renal function has improved, GFR has gone from 57.5-81, electrolytes are all in normal range.  I would still recommend incorporating the 8 ounces of electrolyte drink daily or nightly to see if this helps with the muscle spasms he has been experiencing, if there is no improvement please let me know.  PSA screening was normal

## 2023-10-19 ENCOUNTER — OFFICE VISIT (OUTPATIENT)
Dept: CARDIOLOGY CLINIC | Age: 68
End: 2023-10-19

## 2023-10-19 VITALS
SYSTOLIC BLOOD PRESSURE: 134 MMHG | BODY MASS INDEX: 29.26 KG/M2 | HEIGHT: 74 IN | WEIGHT: 228 LBS | HEART RATE: 64 BPM | DIASTOLIC BLOOD PRESSURE: 80 MMHG

## 2023-10-19 DIAGNOSIS — E78.5 DYSLIPIDEMIA: ICD-10-CM

## 2023-10-19 DIAGNOSIS — I48.0 PAF (PAROXYSMAL ATRIAL FIBRILLATION) (HCC): Primary | ICD-10-CM

## 2023-10-19 DIAGNOSIS — I25.10 CORONARY ARTERY DISEASE INVOLVING NATIVE CORONARY ARTERY OF NATIVE HEART WITHOUT ANGINA PECTORIS: ICD-10-CM

## 2023-10-19 DIAGNOSIS — I10 ESSENTIAL HYPERTENSION: ICD-10-CM

## 2023-10-19 LAB
INTERNATIONAL NORMALIZATION RATIO, POC: 2.9
PROTHROMBIN TIME, POC: 29.3

## 2023-10-19 RX ORDER — SEMAGLUTIDE 1.34 MG/ML
INJECTION, SOLUTION SUBCUTANEOUS WEEKLY
COMMUNITY
Start: 2023-09-28

## 2023-10-19 NOTE — PATIENT INSTRUCTIONS
Continue same coumadin dose and recheck in 6 weeks   Maintain good blood pressure control-goal<130/80 at rest  Maintain good cholesterol control LDL goal<70 with arterial disease  If you are diabetic work to keep/obtain hemoglobin A1c< 7    Follow up in July  With Dr. Duane Goode   Call with any questions or concerns  Follow up with BEVERLY Braun - NP for non cardiac problems  Report any new problems  Cardiovascular Fitness-Exercise as tolerated. Strive for 30 minutes of exercise most days of the week. Cardiac / Healthy Diet- Avoid processed high fat foods, maintain low sodium/salt   Continue current medications as directed  Continue plan of treatment  It is always recommended that you bring your medications bottles with you to each visit - this is for your safety!

## 2023-11-03 RX ORDER — WARFARIN SODIUM 5 MG/1
TABLET ORAL
Qty: 180 TABLET | Refills: 2 | Status: SHIPPED | OUTPATIENT
Start: 2023-11-03

## 2023-11-07 DIAGNOSIS — E11.69 TYPE 2 DIABETES MELLITUS WITH OTHER SPECIFIED COMPLICATION, UNSPECIFIED WHETHER LONG TERM INSULIN USE: ICD-10-CM

## 2023-11-07 DIAGNOSIS — E11.649 TYPE 2 DIABETES MELLITUS WITH HYPOGLYCEMIA WITHOUT COMA, WITHOUT LONG-TERM CURRENT USE OF INSULIN: ICD-10-CM

## 2023-11-07 RX ORDER — BLOOD SUGAR DIAGNOSTIC
STRIP MISCELLANEOUS
Qty: 100 EACH | Refills: 5 | Status: SHIPPED | OUTPATIENT
Start: 2023-11-07

## 2023-11-07 RX ORDER — DAPAGLIFLOZIN 10 MG/1
5 TABLET, FILM COATED ORAL DAILY
Qty: 90 TABLET | Refills: 1 | Status: SHIPPED | OUTPATIENT
Start: 2023-11-07 | End: 2023-11-09 | Stop reason: ALTCHOICE

## 2023-11-07 NOTE — TELEPHONE ENCOUNTER
Per last note, unsure if he is supposed to be on 5 or 10mg, as far as refills are concerning.  Please adjust accordingly or if patient needs to be instructed or questioned, please return message to the pool.

## 2023-11-09 RX ORDER — DAPAGLIFLOZIN 5 MG/1
5 TABLET, FILM COATED ORAL DAILY
Qty: 30 TABLET | Refills: 5 | Status: SHIPPED | OUTPATIENT
Start: 2023-11-09

## 2023-11-20 RX ORDER — LISINOPRIL 10 MG/1
TABLET ORAL
Qty: 90 TABLET | Refills: 3 | Status: SHIPPED | OUTPATIENT
Start: 2023-11-20

## 2023-11-27 DIAGNOSIS — G25.81 RESTLESS LEG SYNDROME: ICD-10-CM

## 2023-11-28 RX ORDER — BACLOFEN 10 MG/1
TABLET ORAL
Qty: 90 TABLET | Refills: 2 | Status: SHIPPED | OUTPATIENT
Start: 2023-11-28

## 2023-12-01 ENCOUNTER — ANTI-COAG VISIT (OUTPATIENT)
Dept: CARDIOLOGY CLINIC | Age: 68
End: 2023-12-01

## 2023-12-01 DIAGNOSIS — I48.0 PAF (PAROXYSMAL ATRIAL FIBRILLATION) (HCC): Primary | ICD-10-CM

## 2023-12-01 LAB
INTERNATIONAL NORMALIZATION RATIO, POC: 2.4
PROTHROMBIN TIME, POC: 24.4

## 2023-12-07 ENCOUNTER — OFFICE VISIT (OUTPATIENT)
Dept: INTERNAL MEDICINE | Facility: CLINIC | Age: 68
End: 2023-12-07
Payer: MEDICARE

## 2023-12-07 VITALS
HEIGHT: 74 IN | OXYGEN SATURATION: 95 % | WEIGHT: 232 LBS | BODY MASS INDEX: 29.77 KG/M2 | SYSTOLIC BLOOD PRESSURE: 122 MMHG | HEART RATE: 80 BPM | TEMPERATURE: 97.1 F | DIASTOLIC BLOOD PRESSURE: 72 MMHG

## 2023-12-07 DIAGNOSIS — B35.4 TINEA CORPORIS: ICD-10-CM

## 2023-12-07 DIAGNOSIS — E11.65 TYPE 2 DIABETES MELLITUS WITH HYPERGLYCEMIA, WITHOUT LONG-TERM CURRENT USE OF INSULIN: ICD-10-CM

## 2023-12-07 PROCEDURE — 3074F SYST BP LT 130 MM HG: CPT

## 2023-12-07 PROCEDURE — 3044F HG A1C LEVEL LT 7.0%: CPT

## 2023-12-07 PROCEDURE — 1160F RVW MEDS BY RX/DR IN RCRD: CPT

## 2023-12-07 PROCEDURE — 3078F DIAST BP <80 MM HG: CPT

## 2023-12-07 PROCEDURE — 99213 OFFICE O/P EST LOW 20 MIN: CPT

## 2023-12-07 PROCEDURE — 1159F MED LIST DOCD IN RCRD: CPT

## 2023-12-07 RX ORDER — SEMAGLUTIDE 1.34 MG/ML
1 INJECTION, SOLUTION SUBCUTANEOUS WEEKLY
Qty: 9 ML | Refills: 2 | Status: SHIPPED | OUTPATIENT
Start: 2023-12-07

## 2023-12-07 RX ORDER — CLOTRIMAZOLE AND BETAMETHASONE DIPROPIONATE 10; .64 MG/G; MG/G
1 CREAM TOPICAL 2 TIMES DAILY
Qty: 240 G | Refills: 1 | Status: SHIPPED | OUTPATIENT
Start: 2023-12-07 | End: 2023-12-08 | Stop reason: SDUPTHER

## 2023-12-07 RX ORDER — CLOTRIMAZOLE AND BETAMETHASONE DIPROPIONATE 10; .64 MG/G; MG/G
1 CREAM TOPICAL 2 TIMES DAILY
Qty: 240 G | Refills: 1 | Status: SHIPPED | OUTPATIENT
Start: 2023-12-07 | End: 2023-12-07 | Stop reason: SDUPTHER

## 2023-12-07 NOTE — PROGRESS NOTES
Subjective   Jn Cárdenas is a 68 y.o. male.   Chief Complaint   Patient presents with    Follow-up     2 month f/u from last visit on 10/18/2023;  Patient states the rash located in the groin area has spread down to his thighs.  Experiencing mild redness.   Denies pain, swelling.   Patient state he is out of the cream and will be going out of town.        History of Present Illness   Mr. Cárdenas presents to the office today with complaints of recurrent fungal infection in groin region as well as to discuss management of his type 2 diabetes.  We have been treating him for tinea corporis intermittently for the past year, ever since he has been on Farxiga but incidences have been occurring frequently.  We have noted improvement with temporary hold of the Farxiga so we proceeded with decreasing the dosage from 10 mg daily to 5 mg daily in hopes that this would decrease incidence however unfortunately it has not.  He states he is having to use the Lotrisone cream almost daily in order to maintain the rash in his groin.  He states he has run out of of the Lotrisone cream and is about to go on a 12-day trip and desperately needs it refilled however the pharmacy will not fill it because it is too early, he does not believe that they actually gave him the 240 g that was prescribed last time.  We had also decreased his Ozempic to 0.5 mg weekly because he had been having periods of hypoglycemia, he states he has noted in the days prior to his due date for dose administration his blood sugars have been running higher, he states he did have some leftover 1 mg dosages so he actually has been taking 1 mg weekly for the past couple weeks and his blood glucose levels have improved, he would like to get the 1 mg dosage represcribed, especially if we are going to proceed with discontinuing the Farxiga completely.  The following portions of the patient's history were reviewed and updated as appropriate: allergies, current medications,  past family history, past medical history, past social history, past surgical history and problem list.    Review of Systems    Objective   Past Medical History:   Diagnosis Date    Arthritis     Cataract     Coronary artery disease     Diabetes mellitus     Disease of thyroid gland     Gout     Hypertension     Myocardial infarct, old     24 yrs ago    Neuropathy     in feet and legs    Parotid tumor     Spastic colon     TIA (transient ischemic attack)     weakness on left upper extremity      Past Surgical History:   Procedure Laterality Date    BACK SURGERY      had spinal fusion    OTHER SURGICAL HISTORY      had goiter removed  at 4 yrs old    OTHER SURGICAL HISTORY Right     had broken arm,    PAROTIDECTOMY Left 3/21/2017    Procedure: PAROTID TUMOR EXCISION WITH FACIAL NERVE MONITORING,  DISSECTION LEFT;  Surgeon: Armond Leon MD;  Location: [facility];  Service:         Current Outpatient Medications:     allopurinol (ZYLOPRIM) 300 MG tablet, Take 1/2  tablet daily, Disp: 90 tablet, Rfl: 3    baclofen (LIORESAL) 10 MG tablet, TAKE TABLET BY MOUTH NIGHTLY. MAY TAKE UP TO 3 TABLETS IF NEEDED NIGHTLY, Disp: 90 tablet, Rfl: 2    celecoxib (CeleBREX) 100 MG capsule, Take 1 capsule by mouth once daily, Disp: 90 capsule, Rfl: 3    Chromium 200 MCG capsule, Take 200 mcg by mouth 2 (Two) Times a Day., Disp: , Rfl:     CloNIDine (CATAPRES) 0.1 MG tablet, Take 1 tablet by mouth Daily., Disp: , Rfl:     clotrimazole-betamethasone (Lotrisone) 1-0.05 % cream, Apply 1 application  topically to the appropriate area as directed 2 (Two) Times a Day., Disp: 240 g, Rfl: 1    Diphenhydramine-PSE-APAP (ALLERGY SINUS HEADACHE RELIEF PO), Take 1 tablet by mouth Every Night. May take 1-2 daily for sinus headache, Disp: , Rfl:     EQL CINNAMON PO, Take 1,000 mg by mouth Daily., Disp: , Rfl:     fluticasone (VERAMYST) 27.5 MCG/SPRAY nasal spray, 2 sprays into the nostril(s) as directed by provider Daily., Disp: , Rfl:      "gabapentin (NEURONTIN) 100 MG capsule, TAKE 1 TO 3 CAPSULES BY MOUTH NIGHTLY AS NEEDED FOR NEUROPATHY, Disp: 90 capsule, Rfl: 1    levothyroxine (SYNTHROID, LEVOTHROID) 112 MCG tablet, Take 1 tablet by mouth once daily, Disp: 90 tablet, Rfl: 3    lisinopril (PRINIVIL,ZESTRIL) 10 MG tablet, Take 1 tablet by mouth once daily, Disp: 90 tablet, Rfl: 3    Multiple Vitamins-Minerals (ALIVE MENS ENERGY) tablet, Take 1 tablet by mouth Daily., Disp: , Rfl:     NON FORMULARY, 1 application As Needed (is over the counter rollon applies to  legs prn  max freeze)., Disp: , Rfl:     NON FORMULARY, Take 2 tablets by mouth Daily. Healthy feet and nerves, Disp: , Rfl:     omeprazole (priLOSEC) 40 MG capsule, Take 1 capsule by mouth Daily., Disp: , Rfl:     OneTouch Ultra test strip, USE 1 TEST STRIP TO CHECK BLOOD SUGAR TWICE A DAY AS INSTRUCTED, Disp: 100 each, Rfl: 5    Semaglutide, 1 MG/DOSE, (Ozempic, 1 MG/DOSE,) 4 MG/3ML solution pen-injector, Inject 1 mg under the skin into the appropriate area as directed 1 (One) Time Per Week., Disp: 9 mL, Rfl: 2    warfarin (COUMADIN) 5 MG tablet, Take 1 tablet by mouth Daily. Take 7.5mg 3 days weekly, and  10 mg 4 days weekly, Disp: , Rfl:       /72 (BP Location: Left arm, Patient Position: Sitting, Cuff Size: Adult)   Pulse 80   Temp 97.1 °F (36.2 °C) (Temporal)   Ht 188 cm (74.02\")   Wt 105 kg (232 lb)   SpO2 95%   BMI 29.77 kg/m²      Body mass index is 29.77 kg/m².          Physical Exam  Vitals and nursing note reviewed.   Constitutional:       General: He is not in acute distress.     Appearance: Normal appearance. He is not ill-appearing, toxic-appearing or diaphoretic.   HENT:      Head: Normocephalic and atraumatic.   Eyes:      Extraocular Movements: Extraocular movements intact.      Conjunctiva/sclera: Conjunctivae normal.      Pupils: Pupils are equal, round, and reactive to light.   Cardiovascular:      Rate and Rhythm: Normal rate and regular rhythm.      Pulses: " Normal pulses.      Heart sounds: Normal heart sounds.   Pulmonary:      Effort: Pulmonary effort is normal.      Breath sounds: Normal breath sounds.   Musculoskeletal:      Cervical back: Normal range of motion and neck supple.   Skin:     General: Skin is warm and dry.      Findings: Rash (scrotal) present.   Neurological:      General: No focal deficit present.      Mental Status: He is alert and oriented to person, place, and time. Mental status is at baseline.   Psychiatric:         Mood and Affect: Mood normal.         Behavior: Behavior normal.         Thought Content: Thought content normal.         Judgment: Judgment normal.               Assessment & Plan   Diagnoses and all orders for this visit:    1. Type 2 diabetes mellitus with hyperglycemia, without long-term current use of insulin  -     Semaglutide, 1 MG/DOSE, (Ozempic, 1 MG/DOSE,) 4 MG/3ML solution pen-injector; Inject 1 mg under the skin into the appropriate area as directed 1 (One) Time Per Week.  Dispense: 9 mL; Refill: 2    2. Tinea corporis  -     clotrimazole-betamethasone (Lotrisone) 1-0.05 % cream; Apply 1 application  topically to the appropriate area as directed 2 (Two) Times a Day.  Dispense: 240 g; Refill: 1               Plan of care reviewed with Mr. Cárdenas  Proceed with increasing Ozempic to 1 mg weekly, we are going to discontinue the Farxiga as he has had too many recurrent fungal infections for the potential benefits to outweigh the risks.  Refill sent for the Lotrisone cream -which he should not have to use for much longer with discontinuation of the SGT L2.  Skin was examined today, does not appear to be significantly flared up although he did have some scrotal erythema, reports itching.  He is aware to reach out to me if he develops any additional hyperglycemia with discontinuation of the Farxiga, in this case would consider further increase of Ozempic.  For now he will keep his next scheduled appointment in January.      Please  note that portions of this note were completed with a voice recognition program.     Electronically signed by BULMARO De Santiago, 12/07/23, 10:30 CST.

## 2023-12-07 NOTE — TELEPHONE ENCOUNTER
Caller: Tootie Cárdenas    Relationship: Emergency Contact    Best call back number: [phone]     Requested Prescriptions:   Requested Prescriptions     Pending Prescriptions Disp Refills    clotrimazole-betamethasone (Lotrisone) 1-0.05 % cream 240 g 1     Sig: Apply 1 application  topically to the appropriate area as directed 2 (Two) Times a Day.        Pharmacy where request should be sent: [facility] [address] [place], [place] [phone] [phone]      Last office visit with prescribing clinician: 12/7/2023   Last telemedicine visit with prescribing clinician: Visit date not found   Next office visit with prescribing clinician: 1/19/2024     Additional details provided by patient: [facility] DIDN'T HAVE PLEASE SEND ASAP TO [facility]. THEY ARE GOING OUT OF TOWN IN THE MORNING N    Does the patient have less than a 3 day supply:  [x] Yes  [] No    Would you like a call back once the refill request has been completed: [x] Yes [] No    If the office needs to give you a call back, can they leave a voicemail: [x] Yes [] No    Jasmyn Downs Rep   12/07/23 11:29 CST

## 2023-12-08 DIAGNOSIS — B35.4 TINEA CORPORIS: ICD-10-CM

## 2023-12-08 RX ORDER — CLOTRIMAZOLE AND BETAMETHASONE DIPROPIONATE 10; .64 MG/G; MG/G
1 CREAM TOPICAL 2 TIMES DAILY
Qty: 240 G | Refills: 1 | Status: SHIPPED | OUTPATIENT
Start: 2023-12-08

## 2023-12-08 NOTE — TELEPHONE ENCOUNTER
Caller: Jn Cárdenas Don    Relationship: Self    Best call back number: [phone]     Requested Prescriptions:   Requested Prescriptions     Pending Prescriptions Disp Refills    clotrimazole-betamethasone (Lotrisone) 1-0.05 % cream 240 g 1     Sig: Apply 1 application  topically to the appropriate area as directed 2 (Two) Times a Day.        Pharmacy where request should be sent: [place] [address] - [phone] PH - [phone] FX     Last office visit with prescribing clinician: 12/7/2023   Last telemedicine visit with prescribing clinician: Visit date not found   Next office visit with prescribing clinician: 1/19/2024     Additional details provided by patient: CALLER STATED THAT HE IS OUT OF TOWN AND NEEDS THIS TRANSFERRED AS SOON AS POSSIBLE. PATIENT STATED THAT HE IS OUT.    Does the patient have less than a 3 day supply:  [x] Yes  [] No    Would you like a call back once the refill request has been completed: [] Yes [x] No    If the office needs to give you a call back, can they leave a voicemail: [] Yes [x] No    Jasmyn Aceves Rep   12/08/23 08:30 CST

## 2023-12-27 DIAGNOSIS — E11.40 DIABETIC NEUROPATHY, PAINFUL: ICD-10-CM

## 2023-12-28 RX ORDER — GABAPENTIN 100 MG/1
CAPSULE ORAL
Qty: 90 CAPSULE | Refills: 1 | Status: SHIPPED | OUTPATIENT
Start: 2023-12-28

## 2024-01-12 ENCOUNTER — ANTI-COAG VISIT (OUTPATIENT)
Dept: CARDIOLOGY CLINIC | Age: 69
End: 2024-01-12

## 2024-01-12 DIAGNOSIS — I48.0 PAF (PAROXYSMAL ATRIAL FIBRILLATION) (HCC): Primary | ICD-10-CM

## 2024-01-12 LAB
INTERNATIONAL NORMALIZATION RATIO, POC: 3.1
PROTHROMBIN TIME, POC: 30.8

## 2024-01-19 ENCOUNTER — OFFICE VISIT (OUTPATIENT)
Dept: INTERNAL MEDICINE | Facility: CLINIC | Age: 69
End: 2024-01-19
Payer: MEDICARE

## 2024-01-19 VITALS
DIASTOLIC BLOOD PRESSURE: 82 MMHG | HEART RATE: 75 BPM | TEMPERATURE: 97.3 F | HEIGHT: 74 IN | WEIGHT: 232.4 LBS | OXYGEN SATURATION: 96 % | BODY MASS INDEX: 29.82 KG/M2 | SYSTOLIC BLOOD PRESSURE: 126 MMHG

## 2024-01-19 DIAGNOSIS — B35.4 TINEA CORPORIS: ICD-10-CM

## 2024-01-19 DIAGNOSIS — I48.0 PAF (PAROXYSMAL ATRIAL FIBRILLATION): ICD-10-CM

## 2024-01-19 DIAGNOSIS — E78.2 MIXED HYPERLIPIDEMIA: ICD-10-CM

## 2024-01-19 DIAGNOSIS — I25.10 CORONARY ARTERY DISEASE INVOLVING NATIVE CORONARY ARTERY OF NATIVE HEART WITHOUT ANGINA PECTORIS: ICD-10-CM

## 2024-01-19 DIAGNOSIS — E11.42 TYPE 2 DIABETES MELLITUS WITH DIABETIC POLYNEUROPATHY, WITHOUT LONG-TERM CURRENT USE OF INSULIN: Primary | ICD-10-CM

## 2024-01-19 DIAGNOSIS — N18.31 STAGE 3A CHRONIC KIDNEY DISEASE: ICD-10-CM

## 2024-01-19 DIAGNOSIS — I10 ESSENTIAL HYPERTENSION: ICD-10-CM

## 2024-01-19 LAB — HBA1C MFR BLD: 6.5 % (ref 4.5–5.7)

## 2024-01-19 RX ORDER — KETOCONAZOLE 200 MG/1
200 TABLET ORAL WEEKLY
Qty: 2 TABLET | Refills: 0 | Status: SHIPPED | OUTPATIENT
Start: 2024-01-19

## 2024-01-19 NOTE — PROGRESS NOTES
Subjective   Jn Cárdenas is a 68 y.o. male.   Chief Complaint   Patient presents with    Hypertension     3 month f/u;  Denies chest pains and SOB.     Diabetes     3 month f/u;  A1c - 6.5%        Mr. Cárdenas presents to the office today for a routine 3-month follow-up on management of his hypertension, type 2 diabetes and neuropathy -also addressed continued/recurrent tinea corporis.  3 months ago we discontinued his Farxiga after struggling for most a year with recurrent fungal infection, in anticipation that he would have some increased hyperglycemia as result of discontinuation of the Farxiga we increased his Ozempic to 1 mg weekly.  He states that the 1 mg weekly caused him to have several episodes of hypoglycemia, these all occurred during times when he was away from his glucometer and so he was never able to check to see exactly how low he was but he did experience symptoms that are associated with hypoglycemia that resolved with glucose intake.  As a result of this he decreased his dosage to 0.5 mg weekly.  His blood glucose log does reveal adequately controlled blood glucose levels and his A1c today is 6.5%.  He admits that he is disappointed about this as he really likes to keep his A1c below 6%.  He denies any chest pain, shortness of breath.  He feels like the numbness and tingling in his lower extremities are about the same as they have been, he reports good therapeutic relief with gabapentin, takes this medication as prescribed without any reported adverse side effects.  He follows with cardiology in regards to PAF, CAD -takes warfarin, INR was checked yesterday was at 3.0.  The following portions of the patient's history were reviewed and updated as appropriate: allergies, current medications, past family history, past medical history, past social history, past surgical history and problem list.    Review of Systems    Objective   Past Medical History:   Diagnosis Date    Arthritis     Cataract      Coronary artery disease     Diabetes mellitus     Disease of thyroid gland     Gout     Hypertension     Myocardial infarct, old     24 yrs ago    Neuropathy     in feet and legs    Parotid tumor     Spastic colon     TIA (transient ischemic attack)     weakness on left upper extremity      Past Surgical History:   Procedure Laterality Date    BACK SURGERY      had spinal fusion    OTHER SURGICAL HISTORY      had goiter removed  at 4 yrs old    OTHER SURGICAL HISTORY Right     had broken arm,    PAROTIDECTOMY Left 3/21/2017    Procedure: PAROTID TUMOR EXCISION WITH FACIAL NERVE MONITORING,  DISSECTION LEFT;  Surgeon: Armond Leon MD;  Location: [facility] OR;  Service:         Current Outpatient Medications:     allopurinol (ZYLOPRIM) 300 MG tablet, Take 1/2  tablet daily, Disp: 90 tablet, Rfl: 3    baclofen (LIORESAL) 10 MG tablet, TAKE TABLET BY MOUTH NIGHTLY. MAY TAKE UP TO 3 TABLETS IF NEEDED NIGHTLY, Disp: 90 tablet, Rfl: 2    celecoxib (CeleBREX) 100 MG capsule, Take 1 capsule by mouth once daily, Disp: 90 capsule, Rfl: 3    Chromium 200 MCG capsule, Take 200 mcg by mouth 2 (Two) Times a Day., Disp: , Rfl:     CloNIDine (CATAPRES) 0.1 MG tablet, Take 1 tablet by mouth Daily., Disp: , Rfl:     clotrimazole-betamethasone (Lotrisone) 1-0.05 % cream, Apply 1 application  topically to the appropriate area as directed 2 (Two) Times a Day., Disp: 240 g, Rfl: 1    Diphenhydramine-PSE-APAP (ALLERGY SINUS HEADACHE RELIEF PO), Take 1 tablet by mouth Every Night. May take 1-2 daily for sinus headache, Disp: , Rfl:     EQL CINNAMON PO, Take 1,000 mg by mouth Daily., Disp: , Rfl:     fluticasone (VERAMYST) 27.5 MCG/SPRAY nasal spray, 2 sprays into the nostril(s) as directed by provider Daily., Disp: , Rfl:     gabapentin (NEURONTIN) 100 MG capsule, TAKE 1 TO 3 CAPSULES BY MOUTH ONCE DAILY AT BEDTIME AS NEEDED FOLR NEUROPATHY, Disp: 90 capsule, Rfl: 1    levothyroxine (SYNTHROID, LEVOTHROID) 112 MCG tablet, Take 1 tablet  "by mouth once daily, Disp: 90 tablet, Rfl: 3    lisinopril (PRINIVIL,ZESTRIL) 10 MG tablet, Take 1 tablet by mouth once daily, Disp: 90 tablet, Rfl: 3    Multiple Vitamins-Minerals (ALIVE MENS ENERGY) tablet, Take 1 tablet by mouth Daily., Disp: , Rfl:     NON FORMULARY, 1 application As Needed (is over the counter rollon applies to  legs prn  max freeze)., Disp: , Rfl:     NON FORMULARY, Take 2 tablets by mouth Daily. Healthy feet and nerves, Disp: , Rfl:     omeprazole (priLOSEC) 40 MG capsule, Take 1 capsule by mouth Daily., Disp: , Rfl:     OneTouch Ultra test strip, USE 1 TEST STRIP TO CHECK BLOOD SUGAR TWICE A DAY AS INSTRUCTED, Disp: 100 each, Rfl: 5    Semaglutide, 1 MG/DOSE, (Ozempic, 1 MG/DOSE,) 4 MG/3ML solution pen-injector, Inject 1 mg under the skin into the appropriate area as directed 1 (One) Time Per Week., Disp: 9 mL, Rfl: 2    warfarin (COUMADIN) 5 MG tablet, Take 1 tablet by mouth Daily. Take 7.5mg 3 days weekly, and  10 mg 4 days weekly, Disp: , Rfl:     ketoconazole (NIZORAL) 200 MG tablet, Take 1 tablet by mouth 1 (One) Time Per Week. Take second tablet only if infection has not cleared, Disp: 2 tablet, Rfl: 0      /82 (BP Location: Right arm, Patient Position: Sitting, Cuff Size: Adult)   Pulse 75   Temp 97.3 °F (36.3 °C) (Temporal)   Ht 188 cm (74.02\")   Wt 105 kg (232 lb 6.4 oz)   SpO2 96%   BMI 29.82 kg/m²      Body mass index is 29.82 kg/m².          Physical Exam  Vitals and nursing note reviewed.   Constitutional:       Appearance: Normal appearance. He is overweight.   HENT:      Head: Normocephalic and atraumatic.   Eyes:      Extraocular Movements: Extraocular movements intact.      Conjunctiva/sclera: Conjunctivae normal.      Pupils: Pupils are equal, round, and reactive to light.   Cardiovascular:      Rate and Rhythm: Normal rate and regular rhythm.      Pulses: Normal pulses.           Dorsalis pedis pulses are 2+ on the right side and 2+ on the left side.        " Posterior tibial pulses are 2+ on the right side and 2+ on the left side.      Heart sounds: Normal heart sounds.   Pulmonary:      Effort: Pulmonary effort is normal.      Breath sounds: Normal breath sounds.   Abdominal:      General: Bowel sounds are normal.      Palpations: Abdomen is soft.   Musculoskeletal:         General: Normal range of motion.      Cervical back: Normal range of motion and neck supple.      Right lower leg: No edema.      Left lower leg: No edema.      Right foot: Normal range of motion. No deformity, bunion, Charcot foot, foot drop or prominent metatarsal heads.      Left foot: Normal range of motion. No deformity, bunion, Charcot foot, foot drop or prominent metatarsal heads.   Feet:      Right foot:      Protective Sensation: 10 sites tested.  7 sites sensed.      Skin integrity: Callus present.      Toenail Condition: Right toenails are abnormally thick.      Left foot:      Protective Sensation: 10 sites tested.  6 sites sensed.      Skin integrity: Callus present.      Toenail Condition: Left toenails are abnormally thick.   Skin:     Findings: Rash (groin) present.   Neurological:      General: No focal deficit present.      Mental Status: He is alert and oriented to person, place, and time. Mental status is at baseline.      Sensory: Sensory deficit (numbness to lower extremity) present.      Motor: No weakness.      Gait: Gait normal.   Psychiatric:         Mood and Affect: Mood normal.         Behavior: Behavior normal.         Thought Content: Thought content normal.         Judgment: Judgment normal.               Assessment & Plan   Diagnoses and all orders for this visit:    1. Type 2 diabetes mellitus with diabetic polyneuropathy, without long-term current use of insulin (Primary)  -     POC Glycated Hemoglobin, Total    2. Coronary artery disease involving native coronary artery of native heart without angina pectoris    3. Mixed hyperlipidemia    4. Essential  hypertension    5. PAF (paroxysmal atrial fibrillation)    6. Stage 3a chronic kidney disease    7. Tinea corporis  -     ketoconazole (NIZORAL) 200 MG tablet; Take 1 tablet by mouth 1 (One) Time Per Week. Take second tablet only if infection has not cleared  Dispense: 2 tablet; Refill: 0               Plan of care was reviewed with Mr. Cárdenas  We did lab work fairly recently, no need to recheck today besides A1c which did show a slight increase but is still well-controlled at 6.5%.  I discussed with him that his diabetes is currently well-controlled, in fact there can be more adverse impact of having hypoglycemia if we increase his medication like the Ozempic consistently to 1 mg weekly.  We had a discussion of having the option of taking as much as 1 mg of the Ozempic weekly if he knows he is eating more carbohydrate/sugar that week -for instance, if he was going on vacation and he was going to eat out more than usual, otherwise I would recommend that he take 0.5 mg weekly.  He expressed understanding.  Unfortunately the tinea corporis did not resolve as anticipated with the discontinuation of the Farxiga although as he is mention it has improved however he is still having to use the topical Lotrisone fairly frequently.  Based on recommendations on up-to-date, ketoconazole has lower likelihood of interacting with his warfarin and then Diflucan, however this does not indicate it cannot interact at some level since his INR was 3.0 yesterday (goal range for him is 2-3), I have recommended rather than taking 7.5 mg of the warfarin like he would normally tonight to take 5 mg.  He may not require the second dose of the ketoconazole but if he does he would apply the same dose adjustment to his warfarin.  He expressed understanding.  He was also given a list of local dermatology offices, he has been encouraged to go ahead and schedule an appointment with them as it does usually take about 3 months to get an appointment just  in case that this does not improve after the oral antifungal treatment.  He is going to continue to follow with cardiology for management of CAD, PAF, denies any signs or symptoms associated with bleeding.  His blood pressure today is well-controlled at 126/82 so he will continue with current management for hypertension.  Continue with adequate hydration, and current regimen for management of hypertension, diabetes to also assist with management of CKD.  Continue with lifestyle measures for management hyperlipidemia, fairly well-controlled on last assessment.  He reports completing diabetic eye exam last week, he states there were little to no changes.  Unless otherwise warranted he will return in 6 months for his annual Medicare wellness.      Please note that portions of this note were completed with a voice recognition program.     Electronically signed by BULMARO De Santiago, 01/19/24, 14:21 CST.

## 2024-01-26 DIAGNOSIS — E11.649 TYPE 2 DIABETES MELLITUS WITH HYPOGLYCEMIA WITHOUT COMA, WITHOUT LONG-TERM CURRENT USE OF INSULIN: ICD-10-CM

## 2024-01-26 RX ORDER — SEMAGLUTIDE 0.68 MG/ML
INJECTION, SOLUTION SUBCUTANEOUS
Qty: 3 ML | Refills: 0 | OUTPATIENT
Start: 2024-01-26

## 2024-02-09 ENCOUNTER — ANTI-COAG VISIT (OUTPATIENT)
Dept: CARDIOLOGY CLINIC | Age: 69
End: 2024-02-09
Payer: MEDICARE

## 2024-02-09 DIAGNOSIS — I48.0 PAF (PAROXYSMAL ATRIAL FIBRILLATION) (HCC): Primary | ICD-10-CM

## 2024-02-09 LAB
INTERNATIONAL NORMALIZATION RATIO, POC: 3.1
PROTHROMBIN TIME, POC: 31.1

## 2024-02-09 PROCEDURE — 93793 ANTICOAG MGMT PT WARFARIN: CPT | Performed by: CLINICAL NURSE SPECIALIST

## 2024-02-24 DIAGNOSIS — G25.81 RESTLESS LEG SYNDROME: ICD-10-CM

## 2024-02-26 RX ORDER — BACLOFEN 10 MG/1
TABLET ORAL
Qty: 90 TABLET | Refills: 2 | Status: SHIPPED | OUTPATIENT
Start: 2024-02-26

## 2024-02-27 DIAGNOSIS — B35.4 TINEA CORPORIS: ICD-10-CM

## 2024-02-27 RX ORDER — CLOTRIMAZOLE AND BETAMETHASONE DIPROPIONATE 10; .64 MG/G; MG/G
1 CREAM TOPICAL 2 TIMES DAILY
Qty: 240 G | Refills: 1 | Status: SHIPPED | OUTPATIENT
Start: 2024-02-27

## 2024-02-28 ENCOUNTER — OFFICE VISIT (OUTPATIENT)
Dept: PRIMARY CARE CLINIC | Age: 69
End: 2024-02-28
Payer: MEDICARE

## 2024-02-28 VITALS
OXYGEN SATURATION: 96 % | DIASTOLIC BLOOD PRESSURE: 76 MMHG | WEIGHT: 239.25 LBS | HEIGHT: 74 IN | SYSTOLIC BLOOD PRESSURE: 142 MMHG | BODY MASS INDEX: 30.7 KG/M2 | TEMPERATURE: 96.9 F | HEART RATE: 92 BPM

## 2024-02-28 DIAGNOSIS — L30.4 INTERTRIGO: Primary | ICD-10-CM

## 2024-02-28 PROCEDURE — G8417 CALC BMI ABV UP PARAM F/U: HCPCS | Performed by: NURSE PRACTITIONER

## 2024-02-28 PROCEDURE — 3077F SYST BP >= 140 MM HG: CPT | Performed by: NURSE PRACTITIONER

## 2024-02-28 PROCEDURE — 1036F TOBACCO NON-USER: CPT | Performed by: NURSE PRACTITIONER

## 2024-02-28 PROCEDURE — 3017F COLORECTAL CA SCREEN DOC REV: CPT | Performed by: NURSE PRACTITIONER

## 2024-02-28 PROCEDURE — 99203 OFFICE O/P NEW LOW 30 MIN: CPT | Performed by: NURSE PRACTITIONER

## 2024-02-28 PROCEDURE — G8484 FLU IMMUNIZE NO ADMIN: HCPCS | Performed by: NURSE PRACTITIONER

## 2024-02-28 PROCEDURE — G8427 DOCREV CUR MEDS BY ELIG CLIN: HCPCS | Performed by: NURSE PRACTITIONER

## 2024-02-28 PROCEDURE — 3078F DIAST BP <80 MM HG: CPT | Performed by: NURSE PRACTITIONER

## 2024-02-28 PROCEDURE — 1123F ACP DISCUSS/DSCN MKR DOCD: CPT | Performed by: NURSE PRACTITIONER

## 2024-02-28 RX ORDER — NYSTATIN 100000 [USP'U]/G
POWDER TOPICAL
Qty: 60 G | Refills: 0 | Status: SHIPPED | OUTPATIENT
Start: 2024-02-28 | End: 2024-02-28

## 2024-02-28 RX ORDER — NYSTATIN 100000 [USP'U]/G
POWDER TOPICAL
Qty: 60 G | Refills: 0 | Status: SHIPPED | OUTPATIENT
Start: 2024-02-28

## 2024-02-28 ASSESSMENT — ENCOUNTER SYMPTOMS
SORE THROAT: 0
RHINORRHEA: 0
ABDOMINAL PAIN: 0
VOMITING: 0
COUGH: 0
EYE ITCHING: 0
DIARRHEA: 0
SHORTNESS OF BREATH: 0
SINUS PRESSURE: 0
COLOR CHANGE: 0
WHEEZING: 0
CONSTIPATION: 0
NAUSEA: 0
BLOOD IN STOOL: 0
EYE DISCHARGE: 0

## 2024-02-28 NOTE — PATIENT INSTRUCTIONS
- Discussed with patient that due to a drug to drug interaction, I am unable to send diflucan while on warfarin.  - Use nystatin powder to the skin folds and cream to the other areas  - Take a daily probiotic  - Keep areas as clean and dry as possible  - Monitor blood glucose and ensure they are not increasing.   - The patient is to follow up with PCP if symptoms worsen/fail to improve or glucose levels continue to rise.

## 2024-02-28 NOTE — PROGRESS NOTES
consistently runs 140/90 or greater, follow up with PCP within a week.  - The patient is to follow up with PCP if symptoms worsen/fail to improve or glucose levels continue to rise.    Orders Placed This Encounter   Medications    nystatin (MYCOSTATIN) 914230 UNIT/GM powder     Sig: Apply 3 times daily.     Dispense:  60 g     Refill:  0      New Prescriptions    NYSTATIN (MYCOSTATIN) [id number] UNIT/GM POWDER    Apply 3 times daily.        Return if symptoms worsen or fail to improve.         Discussed use, benefits, and side effects of any prescribed medications. All patient questions were answered. Patient voiced understanding of care plan.   Patient was given educational materials - see patient instructions below.     Patient Instructions   - Discussed with patient that due to a drug to drug interaction, I am unable to send diflucan while on warfarin.  - Use nystatin powder to the skin folds and cream to the other areas  - Take a daily probiotic  - Keep areas as clean and dry as possible  - Monitor blood glucose and ensure they are not increasing.   - The patient is to follow up with PCP if symptoms worsen/fail to improve or glucose levels continue to rise.      Electronically signed by BEVERLY Kowalski CNP on 2/28/2024 at 9:43 AM

## 2024-03-18 ENCOUNTER — OFFICE VISIT (OUTPATIENT)
Dept: INTERNAL MEDICINE | Facility: CLINIC | Age: 69
End: 2024-03-18
Payer: MEDICARE

## 2024-03-18 VITALS
WEIGHT: 240 LBS | BODY MASS INDEX: 30.8 KG/M2 | OXYGEN SATURATION: 98 % | TEMPERATURE: 98.1 F | DIASTOLIC BLOOD PRESSURE: 80 MMHG | HEART RATE: 78 BPM | HEIGHT: 74 IN | SYSTOLIC BLOOD PRESSURE: 140 MMHG

## 2024-03-18 DIAGNOSIS — Z79.01 CHRONIC ANTICOAGULATION: ICD-10-CM

## 2024-03-18 DIAGNOSIS — B35.4 TINEA CORPORIS: ICD-10-CM

## 2024-03-18 DIAGNOSIS — E66.09 CLASS 1 OBESITY DUE TO EXCESS CALORIES WITH SERIOUS COMORBIDITY AND BODY MASS INDEX (BMI) OF 30.0 TO 30.9 IN ADULT: ICD-10-CM

## 2024-03-18 DIAGNOSIS — E11.42 TYPE 2 DIABETES MELLITUS WITH DIABETIC POLYNEUROPATHY, WITHOUT LONG-TERM CURRENT USE OF INSULIN: Primary | ICD-10-CM

## 2024-03-18 RX ORDER — FLUCONAZOLE 200 MG/1
200 TABLET ORAL DAILY
Qty: 7 TABLET | Refills: 0 | Status: SHIPPED | OUTPATIENT
Start: 2024-03-18 | End: 2024-03-25

## 2024-03-18 RX ORDER — NYSTATIN 100000 [USP'U]/G
POWDER TOPICAL 3 TIMES DAILY
Qty: 60 G | Refills: 2 | Status: SHIPPED | OUTPATIENT
Start: 2024-03-18

## 2024-03-19 PROBLEM — E66.811 CLASS 1 OBESITY DUE TO EXCESS CALORIES WITH SERIOUS COMORBIDITY AND BODY MASS INDEX (BMI) OF 30.0 TO 30.9 IN ADULT: Status: ACTIVE | Noted: 2024-03-19

## 2024-03-19 PROBLEM — E66.09 CLASS 1 OBESITY DUE TO EXCESS CALORIES WITH SERIOUS COMORBIDITY AND BODY MASS INDEX (BMI) OF 30.0 TO 30.9 IN ADULT: Status: ACTIVE | Noted: 2024-03-19

## 2024-03-22 ENCOUNTER — ANTI-COAG VISIT (OUTPATIENT)
Dept: CARDIOLOGY CLINIC | Age: 69
End: 2024-03-22
Payer: MEDICARE

## 2024-03-22 DIAGNOSIS — I48.0 PAF (PAROXYSMAL ATRIAL FIBRILLATION) (HCC): Primary | ICD-10-CM

## 2024-03-22 LAB
INTERNATIONAL NORMALIZATION RATIO, POC: 1.7
PROTHROMBIN TIME, POC: 17.8

## 2024-03-22 PROCEDURE — 93793 ANTICOAG MGMT PT WARFARIN: CPT | Performed by: CLINICAL NURSE SPECIALIST

## 2024-03-29 ENCOUNTER — ANTI-COAG VISIT (OUTPATIENT)
Dept: CARDIOLOGY CLINIC | Age: 69
End: 2024-03-29

## 2024-03-29 DIAGNOSIS — I48.0 PAF (PAROXYSMAL ATRIAL FIBRILLATION) (HCC): Primary | ICD-10-CM

## 2024-03-29 LAB
INTERNATIONAL NORMALIZATION RATIO, POC: 4
PROTHROMBIN TIME, POC: 39.1

## 2024-04-12 ENCOUNTER — ANTI-COAG VISIT (OUTPATIENT)
Dept: CARDIOLOGY CLINIC | Age: 69
End: 2024-04-12
Payer: MEDICARE

## 2024-04-12 DIAGNOSIS — I48.0 PAF (PAROXYSMAL ATRIAL FIBRILLATION) (HCC): Primary | ICD-10-CM

## 2024-04-12 LAB
INTERNATIONAL NORMALIZATION RATIO, POC: 2.8
PROTHROMBIN TIME, POC: 28.5

## 2024-04-12 PROCEDURE — 93793 ANTICOAG MGMT PT WARFARIN: CPT | Performed by: CLINICAL NURSE SPECIALIST

## 2024-04-29 DIAGNOSIS — E11.40 DIABETIC NEUROPATHY, PAINFUL: ICD-10-CM

## 2024-04-29 RX ORDER — GABAPENTIN 100 MG/1
CAPSULE ORAL
Qty: 90 CAPSULE | Refills: 5 | Status: SHIPPED | OUTPATIENT
Start: 2024-04-29

## 2024-05-21 ENCOUNTER — OFFICE VISIT (OUTPATIENT)
Dept: INTERNAL MEDICINE | Facility: CLINIC | Age: 69
End: 2024-05-21
Payer: MEDICARE

## 2024-05-21 VITALS
HEIGHT: 74 IN | WEIGHT: 237.2 LBS | DIASTOLIC BLOOD PRESSURE: 76 MMHG | HEART RATE: 71 BPM | TEMPERATURE: 97.3 F | OXYGEN SATURATION: 94 % | BODY MASS INDEX: 30.44 KG/M2 | SYSTOLIC BLOOD PRESSURE: 120 MMHG

## 2024-05-21 DIAGNOSIS — E66.09 CLASS 1 OBESITY DUE TO EXCESS CALORIES WITH SERIOUS COMORBIDITY AND BODY MASS INDEX (BMI) OF 30.0 TO 30.9 IN ADULT: ICD-10-CM

## 2024-05-21 DIAGNOSIS — N18.2 STAGE 2 CHRONIC KIDNEY DISEASE: ICD-10-CM

## 2024-05-21 DIAGNOSIS — I10 ESSENTIAL HYPERTENSION: ICD-10-CM

## 2024-05-21 DIAGNOSIS — E11.42 TYPE 2 DIABETES MELLITUS WITH DIABETIC POLYNEUROPATHY, WITHOUT LONG-TERM CURRENT USE OF INSULIN: Primary | ICD-10-CM

## 2024-05-21 DIAGNOSIS — B35.4 TINEA CORPORIS: ICD-10-CM

## 2024-05-21 PROBLEM — Z98.890 H/O MELANOMA EXCISION: Status: ACTIVE | Noted: 2024-05-21

## 2024-05-21 PROBLEM — Z85.820 H/O MELANOMA EXCISION: Status: ACTIVE | Noted: 2024-05-21

## 2024-05-21 PROCEDURE — 3078F DIAST BP <80 MM HG: CPT

## 2024-05-21 PROCEDURE — 1159F MED LIST DOCD IN RCRD: CPT

## 2024-05-21 PROCEDURE — 99214 OFFICE O/P EST MOD 30 MIN: CPT

## 2024-05-21 PROCEDURE — 3074F SYST BP LT 130 MM HG: CPT

## 2024-05-21 PROCEDURE — 1160F RVW MEDS BY RX/DR IN RCRD: CPT

## 2024-05-21 PROCEDURE — 3044F HG A1C LEVEL LT 7.0%: CPT

## 2024-05-21 PROCEDURE — 1126F AMNT PAIN NOTED NONE PRSNT: CPT

## 2024-05-21 RX ORDER — CLOTRIMAZOLE AND BETAMETHASONE DIPROPIONATE 10; .64 MG/G; MG/G
1 CREAM TOPICAL 2 TIMES DAILY PRN
Qty: 90 G | Refills: 1 | Status: SHIPPED | OUTPATIENT
Start: 2024-05-21

## 2024-05-21 RX ORDER — DAPAGLIFLOZIN 5 MG/1
5 TABLET, FILM COATED ORAL DAILY
Qty: 90 TABLET | Refills: 1 | Status: SHIPPED | OUTPATIENT
Start: 2024-05-21

## 2024-05-21 NOTE — PROGRESS NOTES
Subjective   Jn Cárdenas is a 68 y.o. male.   Chief Complaint   Patient presents with    Discuss Medications     Patient would like to discuss restarting Farxiga - states he has seen a Dermatologist and was notified the rash was not from the medication.        History of Present Illness   Mr. Cárdenas presents to the office today to discuss reinitiation of Farxiga in the setting of management of type 2 diabetes, hypertension, tinea corporis.  He has been having issues with recurrent tinea corporis in the perineal region for the last year, because of this Farxiga was discontinued several months ago.  He continued to have recurrence and it was recommended that he seek additional evaluation by dermatology which he did -  He saw Dr. Weaver here locally, he did complete biopsy of dermatitis in the groin area and confirmed tinea corporis, he reports that he was notified that the Farxiga is not a causative factor, rather it is related to chronic accumulation of moisture.  He states he has been made aware that this will likely be continuous recurrent issue and is not related to the Farxiga and there is no contradiction to him reinitiating which she would like today.  He also mentions that while at dermatology they did a thorough skin assessment, noted a concerning lesion on the right side of his face that they biopsied and it was determined he had melanoma however it was caught very early so there was minimal procedure required for appropriate removal.  He also had precancerous lesions frozen on his scalp.  He states he follows up again with dermatology in October.   He reports he has been doing well otherwise, he reports currently no rash in the groin area, he is requesting to have the Lotrisone ointment on hand just in case.  He denies issues such as chest pain, shortness of breath or activity intolerance.    The following portions of the patient's history were reviewed and updated as appropriate: allergies, current  medications, past family history, past medical history, past social history, past surgical history and problem list.    Review of Systems    Objective   Past Medical History:   Diagnosis Date    Arthritis     Cataract     Coronary artery disease     Diabetes mellitus     Disease of thyroid gland     Gout     Hypertension     Myocardial infarct, old     24 yrs ago    Neuropathy     in feet and legs    Parotid tumor     Spastic colon     TIA (transient ischemic attack)     weakness on left upper extremity      Past Surgical History:   Procedure Laterality Date    BACK SURGERY      had spinal fusion    OTHER SURGICAL HISTORY      had goiter removed  at 4 yrs old    OTHER SURGICAL HISTORY Right     had broken arm,    PAROTIDECTOMY Left 3/21/2017    Procedure: PAROTID TUMOR EXCISION WITH FACIAL NERVE MONITORING,  DISSECTION LEFT;  Surgeon: Armond Leon MD;  Location: [facility] OR;  Service:         Current Outpatient Medications:     allopurinol (ZYLOPRIM) 300 MG tablet, Take 1/2  tablet daily, Disp: 90 tablet, Rfl: 3    baclofen (LIORESAL) 10 MG tablet, TAKE UP TO 3 TABLETS BY MOUTH NIGHTLY IF NEEDED, Disp: 90 tablet, Rfl: 2    celecoxib (CeleBREX) 100 MG capsule, Take 1 capsule by mouth once daily, Disp: 90 capsule, Rfl: 3    Chromium 200 MCG capsule, Take 200 mcg by mouth 2 (Two) Times a Day., Disp: , Rfl:     CloNIDine (CATAPRES) 0.1 MG tablet, Take 1 tablet by mouth Daily., Disp: , Rfl:     Diphenhydramine-PSE-APAP (ALLERGY SINUS HEADACHE RELIEF PO), Take 1 tablet by mouth Every Night. May take 1-2 daily for sinus headache, Disp: , Rfl:     EQL CINNAMON PO, Take 1,000 mg by mouth Daily., Disp: , Rfl:     fluticasone (VERAMYST) 27.5 MCG/SPRAY nasal spray, 2 sprays into the nostril(s) as directed by provider Daily., Disp: , Rfl:     gabapentin (NEURONTIN) 100 MG capsule, TAKE 1 TO 3 CAPSULES BY MOUTH ONCE DAILY AT BEDTIME AS NEEDED FOR NEUROPATHY, Disp: 90 capsule, Rfl: 5    levothyroxine (SYNTHROID, LEVOTHROID)  "112 MCG tablet, Take 1 tablet by mouth once daily, Disp: 90 tablet, Rfl: 3    Liraglutide (VICTOZA) 18 MG/3ML solution pen-injector injection, Inject 1.8 mg under the skin into the appropriate area as directed Daily., Disp: 24 mL, Rfl: 3    lisinopril (PRINIVIL,ZESTRIL) 10 MG tablet, Take 1 tablet by mouth once daily, Disp: 90 tablet, Rfl: 3    Multiple Vitamins-Minerals (ALIVE MENS ENERGY) tablet, Take 1 tablet by mouth Daily., Disp: , Rfl:     NON FORMULARY, 1 application  As Needed (is over the counter rollon applies to  legs prn  max freeze). Bio freeze, Disp: , Rfl:     NON FORMULARY, Take 2 tablets by mouth Daily. Healthy feet and nerves, Disp: , Rfl:     nystatin (MYCOSTATIN) 546087 UNIT/GM powder, Apply  topically to the appropriate area as directed 3 (Three) Times a Day., Disp: 60 g, Rfl: 2    omeprazole (priLOSEC) 40 MG capsule, Take 1 capsule by mouth Daily., Disp: , Rfl:     OneTouch Ultra test strip, USE 1 TEST STRIP TO CHECK BLOOD SUGAR TWICE A DAY AS INSTRUCTED, Disp: 100 each, Rfl: 5    warfarin (COUMADIN) 5 MG tablet, Take 1 tablet by mouth Daily. Take 7.5mg 3 days weekly, and  10 mg 4 days weekly, Disp: , Rfl:     clotrimazole-betamethasone (Lotrisone) 1-0.05 % cream, Apply 1 Application topically to the appropriate area as directed 2 (Two) Times a Day As Needed (groin excoriation/fungal disease)., Disp: 90 g, Rfl: 1    dapagliflozin (Farxiga) 5 MG tablet tablet, Take 1 tablet by mouth Daily., Disp: 90 tablet, Rfl: 1      /76 (BP Location: Left arm, Patient Position: Sitting, Cuff Size: Adult)   Pulse 71   Temp 97.3 °F (36.3 °C) (Infrared)   Ht 188 cm (74.02\")   Wt 108 kg (237 lb 3.2 oz)   SpO2 94%   BMI 30.44 kg/m²      Body mass index is 30.44 kg/m².          Physical Exam  Vitals and nursing note reviewed.   Constitutional:       General: He is not in acute distress.     Appearance: Normal appearance. He is obese. He is not ill-appearing, toxic-appearing or diaphoretic.   HENT:      " Head: Normocephalic.      Comments: Right cheek incision with Steri-Strips in place, appears to be healing appropriately  Eyes:      Extraocular Movements: Extraocular movements intact.      Conjunctiva/sclera: Conjunctivae normal.      Pupils: Pupils are equal, round, and reactive to light.   Cardiovascular:      Rate and Rhythm: Normal rate and regular rhythm.      Pulses: Normal pulses.      Heart sounds: Normal heart sounds.   Pulmonary:      Effort: Pulmonary effort is normal.      Breath sounds: Normal breath sounds.   Abdominal:      General: Bowel sounds are normal.      Palpations: Abdomen is soft.   Musculoskeletal:         General: Normal range of motion.      Cervical back: Normal range of motion and neck supple.      Right lower leg: No edema.      Left lower leg: No edema.   Skin:     General: Skin is warm and dry.   Neurological:      General: No focal deficit present.      Mental Status: He is alert and oriented to person, place, and time. Mental status is at baseline.      Motor: No weakness.      Gait: Gait normal.   Psychiatric:         Mood and Affect: Mood normal.         Behavior: Behavior normal.         Thought Content: Thought content normal.         Judgment: Judgment normal.               Assessment & Plan   Diagnoses and all orders for this visit:    1. Type 2 diabetes mellitus with diabetic polyneuropathy, without long-term current use of insulin (Primary)  -     Basic metabolic panel  -     Hemoglobin A1c  -     dapagliflozin (Farxiga) 5 MG tablet tablet; Take 1 tablet by mouth Daily.  Dispense: 90 tablet; Refill: 1  -     Liraglutide (VICTOZA) 18 MG/3ML solution pen-injector injection; Inject 1.8 mg under the skin into the appropriate area as directed Daily.  Dispense: 24 mL; Refill: 3    2. Stage 2 chronic kidney disease    3. Essential hypertension    4. Class 1 obesity due to excess calories with serious comorbidity and body mass index (BMI) of 30.0 to 30.9 in adult    5. Tinea  corporis  -     clotrimazole-betamethasone (Lotrisone) 1-0.05 % cream; Apply 1 Application topically to the appropriate area as directed 2 (Two) Times a Day As Needed (groin excoriation/fungal disease).  Dispense: 90 g; Refill: 1               Plan of care was reviewed with Mr. Cárdenas  Will proceed with reassessing A1c as it was last assessed in January and was at 6.5%.  He reports a.m. fasting glucose this morning was 111 which has been typical for him lately.  He was switched back to Victoza from Ozempic back in March, reports he notes improved therapeutic effect with the Victoza versus the Ozempic.  He had some leftover Farxiga from last years prescription and has been taking 5 mg daily for the last 4 weeks, will recheck a BMP today, unless contraindicated we will continue with Farxiga 5 mg daily in addition to the Victoza for management of his type 2 diabetes/CKD.  He does suffer from polyneuropathy and does continue to take gabapentin with reported good therapeutic effect, his PDMP appears appropriate, currently up-to-date on UDS and CSA.  Overall slight improvement in obesity, encouraged to continue to adhere to a healthy consistent carbohydrate diet, remember to incorporate daily exercise as well for management.  Has been cleared by dermatology for resumption of Farxiga as it is not thought to be a causative factor to the recurrent tinea corporis that he has been experiencing.  He is taking measures as able to avoid moisture accumulation in the folds/perineal area, is requesting prescription of Lotrisone cream to have on hand if he does start becoming symptomatic.  Recent diagnosis of melanoma on the right cheek, successfully removed without any malignancy evident, he will continue to follow with dermatology closely, encouraged to utilize sunscreen and protective clothing/gear when exposed to prolonged sunlight.  Blood pressure is normotensive today, recommend continuing with current management for hypertension.   He does continue to follow with cardiology as well.  Is on warfarin therapy and denies any issues with bleeding currently.    He is aware to return to the office as needed, otherwise we will schedule him 3 months out for annual Medicare wellness visit.    Please note that portions of this note were completed with a voice recognition program.     Electronically signed by BULMARO De Santiago, 05/21/24, 08:48 CDT.

## 2024-05-22 LAB
BUN SERPL-MCNC: 17 MG/DL (ref 8–23)
BUN/CREAT SERPL: 14.5 (ref 7–25)
CALCIUM SERPL-MCNC: 9.2 MG/DL (ref 8.6–10.5)
CHLORIDE SERPL-SCNC: 103 MMOL/L (ref 98–107)
CO2 SERPL-SCNC: 24.7 MMOL/L (ref 22–29)
CREAT SERPL-MCNC: 1.17 MG/DL (ref 0.76–1.27)
EGFRCR SERPLBLD CKD-EPI 2021: 67.9 ML/MIN/1.73
GLUCOSE SERPL-MCNC: 155 MG/DL (ref 65–99)
HBA1C MFR BLD: 6.3 % (ref 4.8–5.6)
POTASSIUM SERPL-SCNC: 4.5 MMOL/L (ref 3.5–5.2)
SODIUM SERPL-SCNC: 141 MMOL/L (ref 136–145)

## 2024-05-23 NOTE — PROGRESS NOTES
Kidney function, electrolyte levels are all in appropriate ranges, A1c is stable at 6.3%, keep up the great work.

## 2024-05-24 ENCOUNTER — ANTI-COAG VISIT (OUTPATIENT)
Dept: CARDIOLOGY CLINIC | Age: 69
End: 2024-05-24

## 2024-05-24 DIAGNOSIS — I48.0 PAF (PAROXYSMAL ATRIAL FIBRILLATION) (HCC): Primary | ICD-10-CM

## 2024-05-24 DIAGNOSIS — Z79.01 LONG TERM (CURRENT) USE OF ANTICOAGULANTS: ICD-10-CM

## 2024-05-24 LAB
INTERNATIONAL NORMALIZATION RATIO, POC: 2.9
PROTHROMBIN TIME, POC: 29.1

## 2024-06-21 DIAGNOSIS — B35.4 TINEA CORPORIS: ICD-10-CM

## 2024-06-21 RX ORDER — NYSTATIN 100000 [USP'U]/G
POWDER TOPICAL
Qty: 60 G | Refills: 3 | Status: SHIPPED | OUTPATIENT
Start: 2024-06-21

## 2024-06-26 RX ORDER — LEVOTHYROXINE SODIUM 112 UG/1
TABLET ORAL
Qty: 90 TABLET | Refills: 3 | Status: SHIPPED | OUTPATIENT
Start: 2024-06-26

## 2024-07-12 ENCOUNTER — ANTI-COAG VISIT (OUTPATIENT)
Dept: CARDIOLOGY CLINIC | Age: 69
End: 2024-07-12
Payer: MEDICARE

## 2024-07-12 DIAGNOSIS — I48.0 PAF (PAROXYSMAL ATRIAL FIBRILLATION) (HCC): Primary | ICD-10-CM

## 2024-07-12 LAB
INTERNATIONAL NORMALIZATION RATIO, POC: 2.6
PROTHROMBIN TIME, POC: 26.9

## 2024-07-12 PROCEDURE — 93793 ANTICOAG MGMT PT WARFARIN: CPT | Performed by: NURSE PRACTITIONER

## 2024-07-24 ENCOUNTER — OFFICE VISIT (OUTPATIENT)
Dept: CARDIOLOGY CLINIC | Age: 69
End: 2024-07-24

## 2024-07-24 VITALS
HEIGHT: 74 IN | BODY MASS INDEX: 30.29 KG/M2 | WEIGHT: 236 LBS | DIASTOLIC BLOOD PRESSURE: 78 MMHG | SYSTOLIC BLOOD PRESSURE: 126 MMHG | HEART RATE: 92 BPM

## 2024-07-24 DIAGNOSIS — Z79.01 LONG TERM (CURRENT) USE OF ANTICOAGULANTS: Primary | ICD-10-CM

## 2024-07-24 LAB
INTERNATIONAL NORMALIZATION RATIO, POC: 2.7
PROTHROMBIN TIME, POC: 27.3

## 2024-07-24 RX ORDER — PRAVASTATIN SODIUM 20 MG
20 TABLET ORAL DAILY
Qty: 90 TABLET | Refills: 3 | Status: SHIPPED | OUTPATIENT
Start: 2024-07-24

## 2024-07-24 ASSESSMENT — ENCOUNTER SYMPTOMS
DIARRHEA: 0
BLOOD IN STOOL: 0
SHORTNESS OF BREATH: 0
VOMITING: 0
ABDOMINAL PAIN: 0
BACK PAIN: 0
ABDOMINAL DISTENTION: 0
COUGH: 0
WHEEZING: 0

## 2024-07-24 NOTE — PROGRESS NOTES
[facility]  Cardiology Office Note  [address], [place]  [address]  Phone: [phone]  Fax: [phone]                            Date:  7/24/2024  Patient: Inder Mroton  Age:  68 y.o., 1955    Referral: No ref. provider found      PROBLEM LIST:    Patient Active Problem List    Diagnosis Date Noted    Syncope 02/08/2012     Priority: High     Overview Note:     5/26/2006  Loop recorder placed  11/11/2010  Loop recorder removed        Cardiomyopathy ([facility]) 06/23/2011     Priority: High     Overview Note:     6/17/1998  SE  negative for myocardial ischemia  2/21/2001  Cath  NCA with anterior/apical akinesis  2/24/2002  Echo  EF  45%  7/14/2003  cardiolite  negative for myocardial ischemia, EF 58%  7/8/2004  SE  negative for myocardial ischemia  5/13/2008  Echo  Normal LVFX, diastolic dysfunction\  5/13/2008  SE  negative for myocardial ischemia  02/15/2012  SE  negative for myocardial ischemia ([facility])  11/7/16 Echo mild-mod MR, mild AR, EF 49%          Chest pain 05/25/2019     Priority: Low    Chronic anticoagulation 05/18/2017     Priority: Low     Overview Note:     Coumadin managed by CAP      PAF (paroxysmal atrial fibrillation) ([facility]) 07/09/2015     Priority: Low    MI (myocardial infarction) ([facility])      Priority: Low    CAD (coronary artery disease)      Priority: Low    Gout 02/08/2012     Priority: Low    Diabetes mellitus ([facility])      Priority: Low     Overview Note:     Type 2      Cerebral artery occlusion with cerebral infarction ([facility])      Priority: Low    Hyperlipidemia      Priority: Low    Hypertension      Priority: Low     1.  Reported ischemic cardiomyopathy with prior myocardial infarction involving LAD territory with anterolateral and apical hypokinesis, catheterization 2/21/2001 with reported minimal disease, ejection fraction 45%.  2.  Reported paroxysmal atrial fibrillation with no documentation available, on Coumadin for over 20 years,

## 2024-08-22 ENCOUNTER — OFFICE VISIT (OUTPATIENT)
Dept: INTERNAL MEDICINE | Facility: CLINIC | Age: 69
End: 2024-08-22
Payer: MEDICARE

## 2024-08-22 VITALS
OXYGEN SATURATION: 94 % | HEART RATE: 67 BPM | DIASTOLIC BLOOD PRESSURE: 82 MMHG | WEIGHT: 237.4 LBS | BODY MASS INDEX: 30.47 KG/M2 | SYSTOLIC BLOOD PRESSURE: 122 MMHG | HEIGHT: 74 IN | TEMPERATURE: 97.5 F

## 2024-08-22 DIAGNOSIS — M1A.9XX0 CHRONIC GOUT WITHOUT TOPHUS, UNSPECIFIED CAUSE, UNSPECIFIED SITE: ICD-10-CM

## 2024-08-22 DIAGNOSIS — N18.2 STAGE 2 CHRONIC KIDNEY DISEASE: ICD-10-CM

## 2024-08-22 DIAGNOSIS — Z79.01 CHRONIC ANTICOAGULATION: ICD-10-CM

## 2024-08-22 DIAGNOSIS — I25.10 CORONARY ARTERY DISEASE INVOLVING NATIVE CORONARY ARTERY OF NATIVE HEART WITHOUT ANGINA PECTORIS: ICD-10-CM

## 2024-08-22 DIAGNOSIS — E03.9 ACQUIRED HYPOTHYROIDISM: ICD-10-CM

## 2024-08-22 DIAGNOSIS — I10 ESSENTIAL HYPERTENSION: ICD-10-CM

## 2024-08-22 DIAGNOSIS — E66.09 CLASS 1 OBESITY DUE TO EXCESS CALORIES WITH SERIOUS COMORBIDITY AND BODY MASS INDEX (BMI) OF 30.0 TO 30.9 IN ADULT: ICD-10-CM

## 2024-08-22 DIAGNOSIS — E78.2 MIXED HYPERLIPIDEMIA: ICD-10-CM

## 2024-08-22 DIAGNOSIS — Z00.00 ENCOUNTER FOR SUBSEQUENT ANNUAL WELLNESS VISIT (AWV) IN MEDICARE PATIENT: Primary | ICD-10-CM

## 2024-08-22 DIAGNOSIS — I48.0 PAF (PAROXYSMAL ATRIAL FIBRILLATION): ICD-10-CM

## 2024-08-22 DIAGNOSIS — E11.42 TYPE 2 DIABETES MELLITUS WITH DIABETIC POLYNEUROPATHY, WITHOUT LONG-TERM CURRENT USE OF INSULIN: ICD-10-CM

## 2024-08-22 DIAGNOSIS — Z79.899 ENCOUNTER FOR LONG-TERM (CURRENT) USE OF MEDICATIONS: ICD-10-CM

## 2024-08-22 LAB — HBA1C MFR BLD: 6.1 % (ref 4.5–5.7)

## 2024-08-22 RX ORDER — GLIPIZIDE 5 MG/1
5 TABLET ORAL DAILY PRN
Qty: 30 TABLET | Refills: 0 | Status: SHIPPED | OUTPATIENT
Start: 2024-08-22

## 2024-08-22 NOTE — ASSESSMENT & PLAN NOTE
Diabetes is stable.   Continue current treatment regimen.  Recommended an ADA diet.  Regular aerobic exercise.  Discussed foot care.  Home blood glucose log revealed/reviewed during office visits, no evidence of any hypoglycemic episodes, not documented but per report has had several occurrences where his blood glucose is elevated to 160 165 and has stayed elevated per his report he has managed this on these days by taking one of his wife's, 5 mg glipizide tablets and states it has worked well for him.  He states between the time he was seen here last and today he has not had to take this may be 5 or 6 times.  He does report getting anxious whenever he sees his blood glucose levels being more elevated, he very much wants to avoid having any significant hyperglycemia if at all possible.  Diabetes will be reassessed in 3 months  We had a long discussion about the risk versus benefits of being more intensive with type 2 diabetes treatment, I advised from my perspective with an A1c of 6.1% he is not in need of additional pharmacological management and glipizide does pose some risks, especially hypoglycemia which can cause more harm than hyperglycemic levels such as 160 -  however for him this is also part of managing his anxiety, I advised he can take glipizide 5 mg daily on days that his blood glucose level is persistently elevated past 165.  He will continue with treatment regimen as is otherwise.

## 2024-08-22 NOTE — ASSESSMENT & PLAN NOTE
Lipid abnormalities are stable    Plan:  Continue same medication/s without change.      Discussed medication dosage, use, side effects, and goals of treatment in detail.    Counseled patient on lifestyle modifications to help control hyperlipidemia.   Weight Loss encouraged    Patient Treatment Goals:   LDL goal is less than 70    Followup in 6 months.

## 2024-08-22 NOTE — ASSESSMENT & PLAN NOTE
Renal condition is stable.  Continue current treatment regimen.  Weight loss.  Regular aerobic exercise.  Sodium restriction  Renal condition will be reassessed in 3 months.

## 2024-08-22 NOTE — ASSESSMENT & PLAN NOTE
Coronary Artery Disease (OPTIONAL): Coronary artery disease is stable.  Continue current treatment regimen. Weight loss. Regular aerobic exercise.  Cardiac status will be reassessed in 6 months.

## 2024-08-22 NOTE — PROGRESS NOTES
Subjective   The ABCs of the Annual Wellness Visit  Medicare Wellness Visit      Jn Cárdenas is a 68 y.o. patient who presents for a Medicare Wellness Visit.    The following portions of the patient's history were reviewed and   updated as appropriate: allergies, current medications, past family history, past medical history, past social history, past surgical history, and problem list.    Compared to one year ago, the patient's physical   health is the same.  Compared to one year ago, the patient's mental   health is the same.    Recent Hospitalizations:  He was not admitted to the hospital during the last year.     Current Medical Providers:  Patient Care Team:  Kimberly Brothers APRN as PCP - General (Internal Medicine)  Edward, Armond Gallegos MD as Consulting Physician (Otolaryngology)    Outpatient Medications Prior to Visit   Medication Sig Dispense Refill    allopurinol (ZYLOPRIM) 300 MG tablet Take 1/2  tablet daily 90 tablet 3    baclofen (LIORESAL) 10 MG tablet TAKE UP TO 3 TABLETS BY MOUTH NIGHTLY IF NEEDED 90 tablet 2    celecoxib (CeleBREX) 100 MG capsule Take 1 capsule by mouth once daily 90 capsule 3    Chromium 200 MCG capsule Take 200 mcg by mouth 2 (Two) Times a Day.      CloNIDine (CATAPRES) 0.1 MG tablet Take 1 tablet by mouth Daily.      clotrimazole-betamethasone (Lotrisone) 1-0.05 % cream Apply 1 Application topically to the appropriate area as directed 2 (Two) Times a Day As Needed (groin excoriation/fungal disease). 90 g 1    dapagliflozin (Farxiga) 5 MG tablet tablet Take 1 tablet by mouth Daily. 90 tablet 1    Diphenhydramine-PSE-APAP (ALLERGY SINUS HEADACHE RELIEF PO) Take 1 tablet by mouth Every Night. May take 1-2 daily for sinus headache      EQL CINNAMON PO Take 1,000 mg by mouth Daily.      fluticasone (VERAMYST) 27.5 MCG/SPRAY nasal spray 2 sprays into the nostril(s) as directed by provider Daily.      gabapentin (NEURONTIN) 100 MG capsule TAKE 1 TO 3 CAPSULES BY MOUTH ONCE  DAILY AT BEDTIME AS NEEDED FOR NEUROPATHY 90 capsule 5    levothyroxine (SYNTHROID, LEVOTHROID) 112 MCG tablet Take 1 tablet by mouth once daily 90 tablet 3    Liraglutide (VICTOZA) 18 MG/3ML solution pen-injector injection Inject 1.8 mg under the skin into the appropriate area as directed Daily. 24 mL 3    lisinopril (PRINIVIL,ZESTRIL) 10 MG tablet Take 1 tablet by mouth once daily 90 tablet 3    Multiple Vitamins-Minerals (ALIVE MENS ENERGY) tablet Take 1 tablet by mouth Daily.      NON FORMULARY 1 application  As Needed (is over the counter rollon applies to  legs prn  max freeze). Bio freeze      NON FORMULARY Take 2 tablets by mouth Daily. Healthy feet and nerves      nystatin 222984 UNIT/GM powder APPLY TOPICALLY TO THE APPROPRIATE AREA AS DIRECTED 3 TIMES A DAY 60 g 3    omeprazole (priLOSEC) 40 MG capsule Take 1 capsule by mouth Daily.      OneTouch Ultra test strip USE 1 TEST STRIP TO CHECK BLOOD SUGAR TWICE A DAY AS INSTRUCTED 100 each 5    warfarin (COUMADIN) 5 MG tablet Take 1 tablet by mouth Daily. Take 7.5mg 3 days weekly, and  10 mg 4 days weekly      Bromelains (BROMELAIN PO) Take 1 capsule by mouth Daily.       No facility-administered medications prior to visit.     No opioid medication identified on active medication list. I have reviewed chart for other potential  high risk medication/s and harmful drug interactions in the elderly.      Aspirin is not on active medication list.  Aspirin use is not indicated based on review of current medical condition/s. Risk of harm outweighs potential benefits.  .    Patient Active Problem List   Diagnosis    Arthritis    CAD (coronary artery disease)    Acute myocardial infarction, initial episode of care    Cardiomyopathy    Cerebral artery occlusion with cerebral infarction    Chronic anticoagulation    PAF (paroxysmal atrial fibrillation)    Chronic bronchitis, obstructive    Type 2 diabetes mellitus with diabetic polyneuropathy, without long-term current  "use of insulin    Diabetic neuropathy, painful    Dysmetabolic syndrome    Essential hypertension    Gout    Hammer toes of both feet    Paraesophageal hiatal hernia    History of colon polyps    Irritable bowel syndrome    Low back pain    Mixed hyperlipidemia    Peripheral nerve disease    Restless legs syndrome    Seasonal allergic rhinitis, unspecified allergic rhinitis trigger    Callus of heel    Acquired hypothyroidism    Stage 2 chronic kidney disease    Class 1 obesity due to excess calories with serious comorbidity and body mass index (BMI) of 30.0 to 30.9 in adult    H/O melanoma excision     Advance Care Planning Advance Directive is on file.  ACP discussion was held with the patient during this visit. Patient has an advance directive in EMR which is still valid.             Objective   Vitals:    24 0814   BP: 122/82   BP Location: Left arm   Patient Position: Sitting   Cuff Size: Adult   Pulse: 67   Temp: 97.5 °F (36.4 °C)   TempSrc: Infrared   SpO2: 94%   Weight: 108 kg (237 lb 6.4 oz)   Height: 188 cm (74.02\")   PainSc: 0-No pain       Estimated body mass index is 30.46 kg/m² as calculated from the following:    Height as of this encounter: 188 cm (74.02\").    Weight as of this encounter: 108 kg (237 lb 6.4 oz).            Does the patient have evidence of cognitive impairment? No  Lab Results   Component Value Date    HGBA1C 6.1 (A) 2024                                                                                               Health  Risk Assessment    Smoking Status:  Social History     Tobacco Use   Smoking Status Never   Smokeless Tobacco Never     Alcohol Consumption:  Social History     Substance and Sexual Activity   Alcohol Use No       Fall Risk Screen  STEADI Fall Risk Assessment was completed, and patient is at LOW risk for falls.Assessment completed on:2024    Depression Screenin/22/2024     8:16 AM   PHQ-2/PHQ-9 Depression Screening   Little Interest or " Pleasure in Doing Things 0-->not at all   Feeling Down, Depressed or Hopeless 0-->not at all   PHQ-9: Brief Depression Severity Measure Score 0     Health Habits and Functional and Cognitive Screenin/22/2024     8:18 AM   Functional & Cognitive Status   Do you have difficulty preparing food and eating? No   Do you have difficulty bathing yourself, getting dressed or grooming yourself? No   Do you have difficulty using the toilet? No   Do you have difficulty moving around from place to place? No   Do you have trouble with steps or getting out of a bed or a chair? No   Current Diet Diabetic Diet   Dental Exam Up to date   Eye Exam Up to date   Exercise (times per week) 5 times per week   Current Exercises Include Walking   Do you need help using the phone?  No   Are you deaf or do you have serious difficulty hearing?  No   Do you need help to go to places out of walking distance? No   Do you need help shopping? No   Do you need help preparing meals?  No   Do you need help with housework?  No   Do you need help with laundry? No   Do you need help taking your medications? No   Do you need help managing money? No   Do you ever drive or ride in a car without wearing a seat belt? No   Have you felt unusual stress, anger or loneliness in the last month? No   Who do you live with? Spouse   If you need help, do you have trouble finding someone available to you? No   Have you been bothered in the last four weeks by sexual problems? No   Do you have difficulty concentrating, remembering or making decisions? No           Age-appropriate Screening Schedule:  Refer to the list below for future screening recommendations based on patient's age, sex and/or medical conditions. Orders for these recommended tests are listed in the plan section. The patient has been provided with a written plan.    Health Maintenance List  Health Maintenance   Topic Date Due    TDAP/TD VACCINES (1 - Tdap) Never done    ZOSTER VACCINE (1 of 2)  Never done    COVID-19 Vaccine (2 - 2023-24 season) 09/01/2023    LIPID PANEL  06/29/2024    URINE MICROALBUMIN  07/07/2024    INFLUENZA VACCINE  08/01/2024    DIABETIC EYE EXAM  01/11/2025    DIABETIC FOOT EXAM  01/19/2025    HEMOGLOBIN A1C  02/22/2025    BMI FOLLOWUP  03/18/2025    ANNUAL WELLNESS VISIT  08/22/2025    COLORECTAL CANCER SCREENING  10/01/2025    HEPATITIS C SCREENING  Completed    Pneumococcal Vaccine 65+  Completed                                                                                                                                                CMS Preventative Services Quick Reference  Risk Factors Identified During Encounter  Fall Risk-High or Moderate: Discussed Fall Prevention in the home  Immunizations Discussed/Encouraged: Influenza, Shingrix, and COVID19  Inactivity/Sedentary: Patient was advised to exercise at least 150 minutes a week per CDC recommendations.  Polypharmacy: Medication List reviewed  Dental Screening Recommended  Vision Screening Recommended    The above risks/problems have been discussed with the patient.  Pertinent information has been shared with the patient in the After Visit Summary.  An After Visit Summary and PPPS were made available to the patient.    Follow Up:   Next Medicare Wellness visit to be scheduled in 1 year.     Assessment & Plan  Encounter for subsequent annual wellness visit (AWV) in Medicare patient    Type 2 diabetes mellitus with diabetic polyneuropathy, without long-term current use of insulin  Diabetes is stable.   Continue current treatment regimen.  Recommended an ADA diet.  Regular aerobic exercise.  Discussed foot care.  Home blood glucose log revealed/reviewed during office visits, no evidence of any hypoglycemic episodes, not documented but per report has had several occurrences where his blood glucose is elevated to 160 165 and has stayed elevated per his report he has managed this on these days by taking one of his wife's, 5 mg  glipizide tablets and states it has worked well for him.  He states between the time he was seen here last and today he has not had to take this may be 5 or 6 times.  He does report getting anxious whenever he sees his blood glucose levels being more elevated, he very much wants to avoid having any significant hyperglycemia if at all possible.  Diabetes will be reassessed in 3 months  We had a long discussion about the risk versus benefits of being more intensive with type 2 diabetes treatment, I advised from my perspective with an A1c of 6.1% he is not in need of additional pharmacological management and glipizide does pose some risks, especially hypoglycemia which can cause more harm than hyperglycemic levels such as 160 -  however for him this is also part of managing his anxiety, I advised he can take glipizide 5 mg daily on days that his blood glucose level is persistently elevated past 165.  He will continue with treatment regimen as is otherwise.  Essential hypertension  Hypertension is stable and controlled  Continue current treatment regimen.  Weight loss.  Regular aerobic exercise.  Blood pressure will be reassessed in 6 months.  Mixed hyperlipidemia   Lipid abnormalities are stable    Plan:  Continue same medication/s without change.      Discussed medication dosage, use, side effects, and goals of treatment in detail.    Counseled patient on lifestyle modifications to help control hyperlipidemia.   Weight Loss encouraged    Patient Treatment Goals:   LDL goal is less than 70    Followup in 6 months.  Coronary artery disease involving native coronary artery of native heart without angina pectoris  Coronary Artery Disease (OPTIONAL): Coronary artery disease is stable.  Continue current treatment regimen. Weight loss. Regular aerobic exercise.  Cardiac status will be reassessed in 6 months.  PAF (paroxysmal atrial fibrillation)  Is going to continue with current treatment regimen, recommended to continue to  follow with cardiology  Chronic anticoagulation  No reports of abnormal bleeding  Stage 2 chronic kidney disease  Renal condition is stable.  Continue current treatment regimen.  Weight loss.  Regular aerobic exercise.  Sodium restriction  Renal condition will be reassessed in 3 months.  Acquired hypothyroidism  Continue with current levothyroxine dosage, recheck TSH today  Chronic gout without tophus, unspecified cause, unspecified site  No recent flares, continue with allopurinol, recheck uric acid today  Class 1 obesity due to excess calories with serious comorbidity and body mass index (BMI) of 30.0 to 30.9 in adult  Patient's (Body mass index is 30.46 kg/m².) indicates that they are  with health conditions that include  . Weight is . BMI  . We discussed .   Encounter for long-term (current) use of medications      Orders Placed This Encounter   Procedures    Comprehensive Metabolic Panel    Lipid Panel    TSH Rfx On Abnormal To Free T4    Microalbumin / Creatinine Urine Ratio - Urine, Clean Catch    Uric Acid    POC Glycated Hemoglobin, Total    CBC & Differential    Urinalysis With Microscopic - Urine, Clean Catch     New Medications Ordered This Visit   Medications    glipizide (Glucotrol) 5 MG tablet     Sig: Take 1 tablet by mouth Daily As Needed (if blood glucose is greater than 165 and maintaining).     Dispense:  30 tablet     Refill:  0     We discussed how it is too soon to complete PSA screening since this was done in October 2023, we will plan on doing a PSA screening at his follow-up appointment  Recommended to continue with annual diabetic eye exam  Continue with routine dental exams  Discussed immunizations, getting influenza vaccine in October, declines COVID-19 vaccine  Shingrix vaccine is available at pharmacy  Remember to utilize sunscreen when exposed to prolonged sunlight  Always use your seatbelt when a motorized vehicle  Encouraged to return to the office as needed, otherwise anticipate  returning in 3 months for recheck.      Follow Up:   Return in about 3 months (around 11/22/2024) for Recheck psa screening.

## 2024-08-22 NOTE — ASSESSMENT & PLAN NOTE
Is going to continue with current treatment regimen, recommended to continue to follow with cardiology

## 2024-08-22 NOTE — ASSESSMENT & PLAN NOTE
Patient's (Body mass index is 30.46 kg/m².) indicates that they are obese (BMI >30) with health conditions that include hypertension, coronary heart disease, diabetes mellitus, and dyslipidemias . Weight is improving with lifestyle modifications. BMI  is above average; BMI management plan is completed. We discussed portion control and increasing exercise.

## 2024-08-23 LAB
ALBUMIN SERPL-MCNC: 4.7 G/DL (ref 3.5–5.2)
ALBUMIN/CREAT UR: 7 MG/G CREAT (ref 0–29)
ALBUMIN/GLOB SERPL: 1.7 G/DL
ALP SERPL-CCNC: 94 U/L (ref 39–117)
ALT SERPL-CCNC: 27 U/L (ref 1–41)
APPEARANCE UR: CLEAR
AST SERPL-CCNC: 23 U/L (ref 1–40)
BACTERIA #/AREA URNS HPF: NORMAL /HPF
BASOPHILS # BLD AUTO: 0.1 10*3/MM3 (ref 0–0.2)
BASOPHILS NFR BLD AUTO: 1.2 % (ref 0–1.5)
BILIRUB SERPL-MCNC: 0.4 MG/DL (ref 0–1.2)
BILIRUB UR QL STRIP: NEGATIVE
BUN SERPL-MCNC: 25 MG/DL (ref 8–23)
BUN/CREAT SERPL: 20 (ref 7–25)
CALCIUM SERPL-MCNC: 9.3 MG/DL (ref 8.6–10.5)
CASTS URNS MICRO: NORMAL
CHLORIDE SERPL-SCNC: 98 MMOL/L (ref 98–107)
CHOLEST SERPL-MCNC: 134 MG/DL (ref 0–200)
CO2 SERPL-SCNC: 22.6 MMOL/L (ref 22–29)
COLOR UR: YELLOW
CREAT SERPL-MCNC: 1.25 MG/DL (ref 0.76–1.27)
CREAT UR-MCNC: 50.9 MG/DL
EGFRCR SERPLBLD CKD-EPI 2021: 62.7 ML/MIN/1.73
EOSINOPHIL # BLD AUTO: 0.55 10*3/MM3 (ref 0–0.4)
EOSINOPHIL NFR BLD AUTO: 6.6 % (ref 0.3–6.2)
EPI CELLS #/AREA URNS HPF: NORMAL /HPF
ERYTHROCYTE [DISTWIDTH] IN BLOOD BY AUTOMATED COUNT: 14.2 % (ref 12.3–15.4)
GLOBULIN SER CALC-MCNC: 2.7 GM/DL
GLUCOSE SERPL-MCNC: 111 MG/DL (ref 65–99)
GLUCOSE UR QL STRIP: ABNORMAL
HCT VFR BLD AUTO: 48.5 % (ref 37.5–51)
HDLC SERPL-MCNC: 29 MG/DL (ref 40–60)
HGB BLD-MCNC: 16.6 G/DL (ref 13–17.7)
HGB UR QL STRIP: NEGATIVE
IMM GRANULOCYTES # BLD AUTO: 0.02 10*3/MM3 (ref 0–0.05)
IMM GRANULOCYTES NFR BLD AUTO: 0.2 % (ref 0–0.5)
KETONES UR QL STRIP: NEGATIVE
LDLC SERPL CALC-MCNC: 68 MG/DL (ref 0–100)
LEUKOCYTE ESTERASE UR QL STRIP: NEGATIVE
LYMPHOCYTES # BLD AUTO: 1.76 10*3/MM3 (ref 0.7–3.1)
LYMPHOCYTES NFR BLD AUTO: 21 % (ref 19.6–45.3)
MCH RBC QN AUTO: 31.9 PG (ref 26.6–33)
MCHC RBC AUTO-ENTMCNC: 34.2 G/DL (ref 31.5–35.7)
MCV RBC AUTO: 93.3 FL (ref 79–97)
MICROALBUMIN UR-MCNC: 3.7 UG/ML
MONOCYTES # BLD AUTO: 0.71 10*3/MM3 (ref 0.1–0.9)
MONOCYTES NFR BLD AUTO: 8.5 % (ref 5–12)
NEUTROPHILS # BLD AUTO: 5.24 10*3/MM3 (ref 1.7–7)
NEUTROPHILS NFR BLD AUTO: 62.5 % (ref 42.7–76)
NITRITE UR QL STRIP: NEGATIVE
NRBC BLD AUTO-RTO: 0 /100 WBC (ref 0–0.2)
PH UR STRIP: 6 [PH] (ref 5–8)
PLATELET # BLD AUTO: 232 10*3/MM3 (ref 140–450)
POTASSIUM SERPL-SCNC: 4.6 MMOL/L (ref 3.5–5.2)
PROT SERPL-MCNC: 7.4 G/DL (ref 6–8.5)
PROT UR QL STRIP: NEGATIVE
RBC # BLD AUTO: 5.2 10*6/MM3 (ref 4.14–5.8)
RBC #/AREA URNS HPF: NORMAL /HPF
SODIUM SERPL-SCNC: 139 MMOL/L (ref 136–145)
SP GR UR STRIP: 1.02 (ref 1–1.03)
TRIGL SERPL-MCNC: 221 MG/DL (ref 0–150)
TSH SERPL DL<=0.005 MIU/L-ACNC: 3.28 UIU/ML (ref 0.27–4.2)
URATE SERPL-MCNC: 5.1 MG/DL (ref 3.4–7)
UROBILINOGEN UR STRIP-MCNC: ABNORMAL MG/DL
VLDLC SERPL CALC-MCNC: 37 MG/DL (ref 5–40)
WBC # BLD AUTO: 8.38 10*3/MM3 (ref 3.4–10.8)
WBC #/AREA URNS HPF: NORMAL /HPF

## 2024-08-23 NOTE — PROGRESS NOTES
Uric acid level is normal  Thyroid labs appear normal  Renal function is stable, normal electrolytes, normal liver enzymes  Urine microalbumin/creatinine ratio is in appropriate range  CBC unremarkable  Cholesterol overall has improved with LDL being lower, triglycerides are the same, continue with consistent carbohydrate diet for management of this  Urinalysis normal

## 2024-08-28 RX ORDER — WARFARIN SODIUM 5 MG/1
TABLET ORAL
Qty: 180 TABLET | Refills: 2 | Status: SHIPPED | OUTPATIENT
Start: 2024-08-28

## 2024-09-06 ENCOUNTER — ANTI-COAG VISIT (OUTPATIENT)
Dept: CARDIOLOGY CLINIC | Age: 69
End: 2024-09-06

## 2024-09-06 DIAGNOSIS — Z79.01 LONG TERM (CURRENT) USE OF ANTICOAGULANTS: ICD-10-CM

## 2024-09-06 DIAGNOSIS — I48.0 PAF (PAROXYSMAL ATRIAL FIBRILLATION) (HCC): Primary | ICD-10-CM

## 2024-09-06 LAB
INTERNATIONAL NORMALIZATION RATIO, POC: 3.3
PROTHROMBIN TIME, POC: 32.4

## 2024-09-11 DIAGNOSIS — B35.4 TINEA CORPORIS: ICD-10-CM

## 2024-09-11 RX ORDER — CLOTRIMAZOLE AND BETAMETHASONE DIPROPIONATE 10; .64 MG/G; MG/G
CREAM TOPICAL
Qty: 90 G | Refills: 1 | Status: SHIPPED | OUTPATIENT
Start: 2024-09-11

## 2024-09-12 ENCOUNTER — OFFICE VISIT (OUTPATIENT)
Dept: INTERNAL MEDICINE | Facility: CLINIC | Age: 69
End: 2024-09-12
Payer: MEDICARE

## 2024-09-12 VITALS
SYSTOLIC BLOOD PRESSURE: 132 MMHG | WEIGHT: 239 LBS | DIASTOLIC BLOOD PRESSURE: 78 MMHG | HEIGHT: 74 IN | TEMPERATURE: 97.6 F | HEART RATE: 79 BPM | BODY MASS INDEX: 30.67 KG/M2 | OXYGEN SATURATION: 95 %

## 2024-09-12 DIAGNOSIS — E11.42 TYPE 2 DIABETES MELLITUS WITH DIABETIC POLYNEUROPATHY, WITHOUT LONG-TERM CURRENT USE OF INSULIN: ICD-10-CM

## 2024-09-12 DIAGNOSIS — M10.9 ACUTE GOUT OF LEFT ANKLE, UNSPECIFIED CAUSE: Primary | ICD-10-CM

## 2024-09-12 PROCEDURE — 1159F MED LIST DOCD IN RCRD: CPT

## 2024-09-12 PROCEDURE — 1125F AMNT PAIN NOTED PAIN PRSNT: CPT

## 2024-09-12 PROCEDURE — 3078F DIAST BP <80 MM HG: CPT

## 2024-09-12 PROCEDURE — 96372 THER/PROPH/DIAG INJ SC/IM: CPT

## 2024-09-12 PROCEDURE — 1160F RVW MEDS BY RX/DR IN RCRD: CPT

## 2024-09-12 PROCEDURE — 3075F SYST BP GE 130 - 139MM HG: CPT

## 2024-09-12 PROCEDURE — 3044F HG A1C LEVEL LT 7.0%: CPT

## 2024-09-12 PROCEDURE — 99213 OFFICE O/P EST LOW 20 MIN: CPT

## 2024-09-12 RX ORDER — COLCHICINE 0.6 MG/1
0.6 TABLET ORAL DAILY
Qty: 10 TABLET | Refills: 0 | Status: SHIPPED | OUTPATIENT
Start: 2024-09-12 | End: 2024-09-22

## 2024-09-12 RX ORDER — TRIAMCINOLONE ACETONIDE 40 MG/ML
40 INJECTION, SUSPENSION INTRA-ARTICULAR; INTRAMUSCULAR ONCE
Status: COMPLETED | OUTPATIENT
Start: 2024-09-12 | End: 2024-09-12

## 2024-09-12 RX ADMIN — TRIAMCINOLONE ACETONIDE 40 MG: 40 INJECTION, SUSPENSION INTRA-ARTICULAR; INTRAMUSCULAR at 13:53

## 2024-09-12 NOTE — PROGRESS NOTES
Subjective   Jn Cárdenas is a 69 y.o. male.   Chief Complaint   Patient presents with    Joint Swelling     Patient complains of L ankle swelling x 2 weeks.   States pain will occur in the evening.   Has tried elevation, icing and these did not improve sx.        Mr. Cárdenas presents to the office today with complaints of what he believes to be gout in the left ankle.  He explains that symptoms have been going on for the last 2 weeks, he states initially he did not think too much about it however the symptoms have been persistent, he recalls similar event 8+ years ago however this was involved with right ankle rather than left ankle.  He confirms he has been taking his allopurinol as prescribed and has not missed any doses.  He also confirms he has been taking his warfarin therapy as prescribed and has been in therapeutic range with his PT/INR checks.  He did recently go on a 5-1/2-hour trip to [place] and back, does mention that he stopped appropriately and symptoms had been occurring prior to this trip.  He cannot recall any traumatic event or injury, no twisting of the ankle joint, no unusual activities that make him consider it being more of a musculoskeletal injury.  He states he has had waxing and waning heat/warmth radiating, pain has been more significant at nighttime, swelling has been persistent, he has also noted redness in the surrounding area.    The following portions of the patient's history were reviewed and updated as appropriate: allergies, current medications, past family history, past medical history, past social history, past surgical history and problem list.    Review of Systems   Musculoskeletal:  Positive for joint swelling.       Objective   Past Medical History:   Diagnosis Date    Arthritis     Cataract     Coronary artery disease     Diabetes mellitus     Disease of thyroid gland     Gout     Hypertension     Myocardial infarct, old     24 yrs ago    Neuropathy     in feet and legs     Parotid tumor     Spastic colon     TIA (transient ischemic attack)     weakness on left upper extremity      Past Surgical History:   Procedure Laterality Date    BACK SURGERY      had spinal fusion    OTHER SURGICAL HISTORY      had goiter removed  at 4 yrs old    OTHER SURGICAL HISTORY Right     had broken arm,    PAROTIDECTOMY Left 3/21/2017    Procedure: PAROTID TUMOR EXCISION WITH FACIAL NERVE MONITORING,  DISSECTION LEFT;  Surgeon: Armond Leon MD;  Location: [facility];  Service:         Current Outpatient Medications:     allopurinol (ZYLOPRIM) 300 MG tablet, Take 1/2  tablet daily, Disp: 90 tablet, Rfl: 3    baclofen (LIORESAL) 10 MG tablet, TAKE UP TO 3 TABLETS BY MOUTH NIGHTLY IF NEEDED, Disp: 90 tablet, Rfl: 2    Bromelains (BROMELAIN PO), Take 1 capsule by mouth Daily., Disp: , Rfl:     celecoxib (CeleBREX) 100 MG capsule, Take 1 capsule by mouth once daily, Disp: 90 capsule, Rfl: 3    Chromium 200 MCG capsule, Take 200 mcg by mouth 2 (Two) Times a Day., Disp: , Rfl:     CloNIDine (CATAPRES) 0.1 MG tablet, Take 1 tablet by mouth Daily., Disp: , Rfl:     clotrimazole-betamethasone (LOTRISONE) 1-0.05 % cream, APPLY 1 APPLICATION TOPICALLY TO THE APPROPRIATE AREA AS DIRECTED 2 TIMES A DAY AS NEEDED TO GROIN, Disp: 90 g, Rfl: 1    dapagliflozin (Farxiga) 5 MG tablet tablet, Take 1 tablet by mouth Daily., Disp: 90 tablet, Rfl: 1    Diphenhydramine-PSE-APAP (ALLERGY SINUS HEADACHE RELIEF PO), Take 1 tablet by mouth Every Night. May take 1-2 daily for sinus headache, Disp: , Rfl:     EQL CINNAMON PO, Take 1,000 mg by mouth Daily., Disp: , Rfl:     fluticasone (VERAMYST) 27.5 MCG/SPRAY nasal spray, 2 sprays into the nostril(s) as directed by provider Daily., Disp: , Rfl:     gabapentin (NEURONTIN) 100 MG capsule, TAKE 1 TO 3 CAPSULES BY MOUTH ONCE DAILY AT BEDTIME AS NEEDED FOR NEUROPATHY, Disp: 90 capsule, Rfl: 5    glipizide (Glucotrol) 5 MG tablet, Take 1 tablet by mouth Daily As Needed (if blood  "glucose is greater than 165 and maintaining)., Disp: 30 tablet, Rfl: 0    levothyroxine (SYNTHROID, LEVOTHROID) 112 MCG tablet, Take 1 tablet by mouth once daily, Disp: 90 tablet, Rfl: 3    Liraglutide (VICTOZA) 18 MG/3ML solution pen-injector injection, Inject 1.8 mg under the skin into the appropriate area as directed Daily., Disp: 24 mL, Rfl: 3    lisinopril (PRINIVIL,ZESTRIL) 10 MG tablet, Take 1 tablet by mouth once daily, Disp: 90 tablet, Rfl: 3    Multiple Vitamins-Minerals (ALIVE MENS ENERGY) tablet, Take 1 tablet by mouth Daily., Disp: , Rfl:     NON FORMULARY, 1 application  As Needed (is over the counter rollon applies to  legs prn  max freeze). Bio freeze, Disp: , Rfl:     NON FORMULARY, Take 2 tablets by mouth Daily. Healthy feet and nerves, Disp: , Rfl:     nystatin [id number] UNIT/GM powder, APPLY TOPICALLY TO THE APPROPRIATE AREA AS DIRECTED 3 TIMES A DAY, Disp: 60 g, Rfl: 3    omeprazole (priLOSEC) 40 MG capsule, Take 1 capsule by mouth Daily., Disp: , Rfl:     OneTouch Ultra test strip, USE 1 TEST STRIP TO CHECK BLOOD SUGAR TWICE A DAY AS INSTRUCTED, Disp: 100 each, Rfl: 5    warfarin (COUMADIN) 5 MG tablet, Take 1 tablet by mouth Daily. Take 7.5mg 3 days weekly, and  10 mg 4 days weekly, Disp: , Rfl:     colchicine 0.6 MG tablet, Take 1 tablet by mouth Daily for 10 days. Take 2 tablets initially x1 dose and then take one tablet daily until gout flare has resolved, Disp: 10 tablet, Rfl: 0    Current Facility-Administered Medications:     triamcinolone acetonide (KENALOG-40) injection 40 mg, 40 mg, Intramuscular, Once, [employer], APRN      /78 (BP Location: Left arm, Patient Position: Sitting, Cuff Size: Adult)   Pulse 79   Temp 97.6 °F (36.4 °C) (Infrared)   Ht 188 cm (74.02\")   Wt 108 kg (239 lb)   SpO2 95%   BMI 30.67 kg/m²      Body mass index is 30.67 kg/m².          Physical Exam  Vitals and nursing note reviewed.   Constitutional:       General: He is not in acute " distress.     Appearance: Normal appearance. He is obese. He is not ill-appearing, toxic-appearing or diaphoretic.   Eyes:      Extraocular Movements: Extraocular movements intact.      Conjunctiva/sclera: Conjunctivae normal.      Pupils: Pupils are equal, round, and reactive to light.   Cardiovascular:      Rate and Rhythm: Normal rate and regular rhythm.      Pulses: Normal pulses.      Heart sounds: Normal heart sounds.   Pulmonary:      Effort: Pulmonary effort is normal.      Breath sounds: Normal breath sounds.   Musculoskeletal:         General: Swelling and tenderness present.      Cervical back: Normal range of motion and neck supple.      Left ankle: Swelling present. No deformity. Tenderness present. Decreased range of motion. Normal pulse.   Skin:     General: Skin is warm and dry.      Capillary Refill: Capillary refill takes less than 2 seconds.      Findings: Erythema (and warmth on palpation of left ankle/surrounding area) present.   Neurological:      General: No focal deficit present.      Mental Status: He is alert and oriented to person, place, and time. Mental status is at baseline.   Psychiatric:         Mood and Affect: Mood normal.         Behavior: Behavior normal.         Thought Content: Thought content normal.         Judgment: Judgment normal.               Assessment & Plan   Diagnoses and all orders for this visit:    1. Acute gout of left ankle, unspecified cause (Primary)  -     colchicine 0.6 MG tablet; Take 1 tablet by mouth Daily for 10 days. Take 2 tablets initially x1 dose and then take one tablet daily until gout flare has resolved  Dispense: 10 tablet; Refill: 0  -     triamcinolone acetonide (KENALOG-40) injection 40 mg    2. Type 2 diabetes mellitus with diabetic polyneuropathy, without long-term current use of insulin               Plan of care reviewed with Mr. Cárdenas  Presents with redness and swelling as well as pain to the left foot/ankle, per his report symptoms feel  similar to previous gout flare in right ankle.  He has been consistent with warfarin therapy and has been in therapeutic range decreasing suspicion for DVT, pulses were easily palpable, he has not been having any systemic fever, chills, decreased suspicion for infection.  Does not recall any traumatic event or injury, decree suspicion for musculoskeletal injury.  Has known history of gout so we will proceed with treating him for this, he has been advised for future reference to present sooner to improve therapeutic effect with treatment.  His most recent A1c was in acceptable range at 6.1%, he is aware that the Kenalog injection in office today will likely cause some hyperglycemia -be extra strict with consistent carbohydrate diet, monitor blood glucose levels and let me know if you run into any significant hyperglycemia, remain consistent with current treatment regimen for type 2 diabetes.  He is aware to expect gradual improvement of his symptoms, if no improvement by Monday please let me know, in this case would likely order x-ray imaging as well as ultrasound.  Encouraged to keep next scheduled appointment with as needed appointments prior to.      Please note that portions of this note were completed with a voice recognition program.     Electronically signed by BULMARO De Santiago, 09/12/24, 13:54 CDT.

## 2024-09-15 DIAGNOSIS — E11.42 TYPE 2 DIABETES MELLITUS WITH DIABETIC POLYNEUROPATHY, WITHOUT LONG-TERM CURRENT USE OF INSULIN: ICD-10-CM

## 2024-09-16 RX ORDER — GLIPIZIDE 5 MG/1
TABLET ORAL
Qty: 30 TABLET | Refills: 0 | Status: SHIPPED | OUTPATIENT
Start: 2024-09-16

## 2024-10-12 DIAGNOSIS — E11.42 TYPE 2 DIABETES MELLITUS WITH DIABETIC POLYNEUROPATHY, WITHOUT LONG-TERM CURRENT USE OF INSULIN: ICD-10-CM

## 2024-10-14 RX ORDER — GLIPIZIDE 5 MG/1
TABLET ORAL
Qty: 30 TABLET | Refills: 0 | Status: SHIPPED | OUTPATIENT
Start: 2024-10-14

## 2024-10-18 ENCOUNTER — CLINICAL SUPPORT (OUTPATIENT)
Dept: INTERNAL MEDICINE | Facility: CLINIC | Age: 69
End: 2024-10-18
Payer: MEDICARE

## 2024-10-18 ENCOUNTER — ANTI-COAG VISIT (OUTPATIENT)
Dept: CARDIOLOGY CLINIC | Age: 69
End: 2024-10-18

## 2024-10-18 DIAGNOSIS — Z23 FLU VACCINE NEED: Primary | ICD-10-CM

## 2024-10-18 DIAGNOSIS — Z79.01 LONG TERM (CURRENT) USE OF ANTICOAGULANTS: ICD-10-CM

## 2024-10-18 DIAGNOSIS — I48.0 PAF (PAROXYSMAL ATRIAL FIBRILLATION) (HCC): Primary | ICD-10-CM

## 2024-10-18 LAB
INTERNATIONAL NORMALIZATION RATIO, POC: 3
PROTHROMBIN TIME, POC: 30.8

## 2024-10-18 PROCEDURE — G0008 ADMIN INFLUENZA VIRUS VAC: HCPCS

## 2024-10-18 PROCEDURE — 90662 IIV NO PRSV INCREASED AG IM: CPT

## 2024-11-03 DIAGNOSIS — G25.81 RESTLESS LEG SYNDROME: ICD-10-CM

## 2024-11-04 RX ORDER — BACLOFEN 10 MG/1
TABLET ORAL
Qty: 90 TABLET | Refills: 2 | Status: SHIPPED | OUTPATIENT
Start: 2024-11-04

## 2024-11-15 DIAGNOSIS — E11.42 TYPE 2 DIABETES MELLITUS WITH DIABETIC POLYNEUROPATHY, WITHOUT LONG-TERM CURRENT USE OF INSULIN: ICD-10-CM

## 2024-11-18 RX ORDER — DAPAGLIFLOZIN 5 MG/1
5 TABLET, FILM COATED ORAL DAILY
Qty: 90 TABLET | Refills: 1 | Status: SHIPPED | OUTPATIENT
Start: 2024-11-18

## 2024-11-21 ENCOUNTER — TELEPHONE (OUTPATIENT)
Dept: INTERNAL MEDICINE | Facility: CLINIC | Age: 69
End: 2024-11-21

## 2024-11-21 ENCOUNTER — OFFICE VISIT (OUTPATIENT)
Dept: INTERNAL MEDICINE | Facility: CLINIC | Age: 69
End: 2024-11-21
Payer: MEDICARE

## 2024-11-21 VITALS
OXYGEN SATURATION: 92 % | DIASTOLIC BLOOD PRESSURE: 74 MMHG | HEIGHT: 74 IN | HEART RATE: 73 BPM | BODY MASS INDEX: 30.7 KG/M2 | SYSTOLIC BLOOD PRESSURE: 126 MMHG | TEMPERATURE: 97.6 F | WEIGHT: 239.2 LBS

## 2024-11-21 DIAGNOSIS — G25.81 RESTLESS LEG SYNDROME: ICD-10-CM

## 2024-11-21 DIAGNOSIS — I10 ESSENTIAL HYPERTENSION: ICD-10-CM

## 2024-11-21 DIAGNOSIS — J44.0 ACUTE BRONCHITIS WITH CHRONIC OBSTRUCTIVE PULMONARY DISEASE (COPD): ICD-10-CM

## 2024-11-21 DIAGNOSIS — E78.2 MIXED HYPERLIPIDEMIA: ICD-10-CM

## 2024-11-21 DIAGNOSIS — N18.2 STAGE 2 CHRONIC KIDNEY DISEASE: ICD-10-CM

## 2024-11-21 DIAGNOSIS — I25.5 ISCHEMIC CARDIOMYOPATHY: ICD-10-CM

## 2024-11-21 DIAGNOSIS — E66.811 CLASS 1 OBESITY DUE TO EXCESS CALORIES WITH SERIOUS COMORBIDITY AND BODY MASS INDEX (BMI) OF 30.0 TO 30.9 IN ADULT: ICD-10-CM

## 2024-11-21 DIAGNOSIS — E11.42 TYPE 2 DIABETES MELLITUS WITH DIABETIC POLYNEUROPATHY, WITHOUT LONG-TERM CURRENT USE OF INSULIN: Primary | ICD-10-CM

## 2024-11-21 DIAGNOSIS — I25.10 CORONARY ARTERY DISEASE INVOLVING NATIVE CORONARY ARTERY OF NATIVE HEART WITHOUT ANGINA PECTORIS: ICD-10-CM

## 2024-11-21 DIAGNOSIS — E03.9 ACQUIRED HYPOTHYROIDISM: ICD-10-CM

## 2024-11-21 DIAGNOSIS — I48.0 PAF (PAROXYSMAL ATRIAL FIBRILLATION): ICD-10-CM

## 2024-11-21 DIAGNOSIS — E66.09 CLASS 1 OBESITY DUE TO EXCESS CALORIES WITH SERIOUS COMORBIDITY AND BODY MASS INDEX (BMI) OF 30.0 TO 30.9 IN ADULT: ICD-10-CM

## 2024-11-21 DIAGNOSIS — J20.9 ACUTE BRONCHITIS WITH CHRONIC OBSTRUCTIVE PULMONARY DISEASE (COPD): ICD-10-CM

## 2024-11-21 DIAGNOSIS — Z79.01 CHRONIC ANTICOAGULATION: ICD-10-CM

## 2024-11-21 LAB
EXPIRATION DATE: ABNORMAL
HBA1C MFR BLD: 5.9 % (ref 4.5–5.7)
Lab: ABNORMAL

## 2024-11-21 RX ORDER — GUAIFENESIN 600 MG/1
1200 TABLET, EXTENDED RELEASE ORAL 2 TIMES DAILY
Qty: 120 TABLET | Refills: 0 | Status: SHIPPED | OUTPATIENT
Start: 2024-11-21 | End: 2024-12-21

## 2024-11-21 RX ORDER — AZITHROMYCIN 250 MG/1
TABLET, FILM COATED ORAL
Qty: 6 TABLET | Refills: 0 | Status: SHIPPED | OUTPATIENT
Start: 2024-11-21

## 2024-11-21 RX ORDER — GLIPIZIDE 5 MG/1
5 TABLET ORAL DAILY PRN
Qty: 30 TABLET | Refills: 3 | Status: SHIPPED | OUTPATIENT
Start: 2024-11-21

## 2024-11-21 NOTE — PROGRESS NOTES
Subjective   Jn Cárdenas is a 69 y.o. male.   Chief Complaint   Patient presents with    Hypertension     3 mo f/u;  Denies chest pains and SOB.    Nasal Congestion     Patient complains of nasal congestion x 2 days.   Experiencing productive cough.  Denies HA, fever, body chills, sore throat, muscle aches.     Diabetes     A1c - 5.9%       History of Present Illness   History of Present Illness  The patient is a 69-year-old male who presents to the office today for a routine 3-month follow-up on the management of type 2 diabetes with diabetic polyneuropathy, essential hypertension, hyperlipidemia, ischemic cardiomyopathy, CAD, chronic anticoagulation in the setting of proximal atrial fibrillation, hypothyroidism, class 1 obesity, stage 2 CKD, chronic bronchitis, obstructive, and restless leg syndrome.    He was last seen in the office on 09/12/2024 for an acute gout flareup. Prior to that, he was seen in 08/2024, at which time his A1c was 6.1 percent. He is very motivated to keep his A1c less than 6.5 percent. We have previously discussed the potential harms of very strict glucose control, which can result in hypoglycemia, also dangerous. He uses glipizide 5 mg as needed for glucose greater than 165. He is on Farxiga 5 mg daily for assistive management of CKD as well as diabetes, given his cardiac history. He has tolerated the 5 mg okay, previously was intolerant of Jardiance with recurrent yeast infections. He takes Victoza 1.8 mg daily as well. His lipid panel in 08/2024 reflected appropriately managed overall hypercholesterolemia. His LDL was 68, which is within the goal of less than 70. We probably will not do any labs today other than an A1c unless otherwise indicated.    He experiences shakiness when his blood sugar drops to 78. He is currently taking glipizide only when his blood sugar exceeds 165. He requires a new prescription for glipizide as his wife has been using some of his supply.  His home blood  glucose log from last 3 months was reviewed and the lowest reading being 78, highest 145.    His neuropathy symptoms fluctuate, with a particularly severe episode occurring a month ago. He finds relief from gabapentin, taking one tablet at night and occasionally two during severe episodes (100 mg tablets). He has not experienced any significant side effects from gabapentin. His restless legs syndrome is manageable, and he believes gabapentin also helps with this condition. He is not currently taking Celebrex. He takes baclofen 10 mg up to 3 times a day for muscle tension pain. He continues to take omeprazole daily for stomach issues, which seems to be effective.    He is on lisinopril 10 mg daily. He is on levothyroxine 112 mcg for hypothyroidism. TSH was normal back in 08/2024. I anticipate not needing to recheck this today, no reports of increased fatigue. His renal function when checked in 08/2024 was stable as well, GFR 62.7. His urine microalbumin/creatinine ratio was 7, which is in the acceptable range. He takes 150 mg of allopurinol daily with his uric acid in 08/2024 being in the acceptable range, so we will likely continue with this unless he has more recurrence of gout flares - he has not.    He has been experiencing sinus issues since Tuesday, which have remained consistent. Over-the-counter cold medication has provided some relief. He has a cough, primarily at night when lying down, and has noticed increased shortness of breath with exertion. He is using Flonase nasal spray and reports no fever or chills. His appetite remains good, and he reports no chest pain or diarrhea. His bowel movements are regular. He has noticed some ear popping and pressure in the maxillary region. He does not have Mucinex at home but drinks plenty of water. He reports no abnormal bleeding.    He had an acute gout flareup in 09/2024 and was treated with a steroid injection and colchicine. He has not had any recurrence of gout  flareup. He is due for an eye checkup in 12/2024, appointment is made.    IMMUNIZATIONS  He has received his influenza vaccine.       The following portions of the patient's history were reviewed and updated as appropriate: allergies, current medications, past family history, past medical history, past social history, past surgical history and problem list.    Review of Systems    Objective   Past Medical History:   Diagnosis Date    Arthritis     Cataract     Coronary artery disease     Diabetes mellitus     Disease of thyroid gland     Gout     Hypertension     Myocardial infarct, old     24 yrs ago    Neuropathy     in feet and legs    Parotid tumor     Spastic colon     TIA (transient ischemic attack)     weakness on left upper extremity      Past Surgical History:   Procedure Laterality Date    BACK SURGERY      had spinal fusion    OTHER SURGICAL HISTORY      had goiter removed  at 4 yrs old    OTHER SURGICAL HISTORY Right     had broken arm,    PAROTIDECTOMY Left 3/21/2017    Procedure: PAROTID TUMOR EXCISION WITH FACIAL NERVE MONITORING,  DISSECTION LEFT;  Surgeon: Armond Leon MD;  Location: [facility];  Service:         Current Outpatient Medications:     allopurinol (ZYLOPRIM) 300 MG tablet, Take 1/2  tablet daily, Disp: 90 tablet, Rfl: 3    baclofen (LIORESAL) 10 MG tablet, TAKE UP TO 3 TABLETS BY MOUTH NIGHTLY IF NEEDED, Disp: 90 tablet, Rfl: 2    Bromelains (BROMELAIN PO), Take 1 capsule by mouth Daily., Disp: , Rfl:     Chromium 200 MCG capsule, Take 200 mcg by mouth 2 (Two) Times a Day., Disp: , Rfl:     CloNIDine (CATAPRES) 0.1 MG tablet, Take 1 tablet by mouth Daily., Disp: , Rfl:     clotrimazole-betamethasone (LOTRISONE) 1-0.05 % cream, APPLY 1 APPLICATION TOPICALLY TO THE APPROPRIATE AREA AS DIRECTED 2 TIMES A DAY AS NEEDED TO GROIN, Disp: 90 g, Rfl: 1    Diphenhydramine-PSE-APAP (ALLERGY SINUS HEADACHE RELIEF PO), Take 1 tablet by mouth Every Night. May take 1-2 daily for sinus headache,  Disp: , Rfl:     EQL CINNAMON PO, Take 1,000 mg by mouth Daily., Disp: , Rfl:     Farxiga 5 MG tablet tablet, Take 1 tablet by mouth once daily, Disp: 90 tablet, Rfl: 1    fluticasone (VERAMYST) 27.5 MCG/SPRAY nasal spray, Administer 2 sprays into the nostril(s) as directed by provider Daily., Disp: , Rfl:     gabapentin (NEURONTIN) 100 MG capsule, TAKE 1 TO 3 CAPSULES BY MOUTH ONCE DAILY AT BEDTIME AS NEEDED FOR NEUROPATHY, Disp: 90 capsule, Rfl: 5    glipizide (GLUCOTROL) 5 MG tablet, Take 1 tablet by mouth Daily As Needed (if blood glucose is greater than 165 consistently)., Disp: 30 tablet, Rfl: 3    levothyroxine (SYNTHROID, LEVOTHROID) 112 MCG tablet, Take 1 tablet by mouth once daily, Disp: 90 tablet, Rfl: 3    Liraglutide (VICTOZA) 18 MG/3ML solution pen-injector injection, Inject 1.8 mg under the skin into the appropriate area as directed Daily., Disp: 24 mL, Rfl: 3    lisinopril (PRINIVIL,ZESTRIL) 10 MG tablet, Take 1 tablet by mouth once daily, Disp: 90 tablet, Rfl: 3    Multiple Vitamins-Minerals (ALIVE MENS ENERGY) tablet, Take 1 tablet by mouth Daily., Disp: , Rfl:     NON FORMULARY, 1 application  As Needed (is over the counter rollon applies to  legs prn  max freeze). Bio freeze, Disp: , Rfl:     NON FORMULARY, Take 2 tablets by mouth Daily. Healthy feet and nerves, Disp: , Rfl:     nystatin 909325 UNIT/GM powder, APPLY TOPICALLY TO THE APPROPRIATE AREA AS DIRECTED 3 TIMES A DAY, Disp: 60 g, Rfl: 3    omeprazole (priLOSEC) 40 MG capsule, Take 1 capsule by mouth Daily., Disp: , Rfl:     OneTouch Ultra test strip, USE 1 TEST STRIP TO CHECK BLOOD SUGAR TWICE A DAY AS INSTRUCTED, Disp: 100 each, Rfl: 5    warfarin (COUMADIN) 5 MG tablet, Take 1 tablet by mouth Daily. Take 7.5mg 3 days weekly, and  10 mg 4 days weekly, Disp: , Rfl:     azithromycin (Zithromax Z-Braxton) 250 MG tablet, Take 2 tablets the first day, then 1 tablet daily for 4 days., Disp: 6 tablet, Rfl: 0    guaiFENesin (Mucinex) 600 MG 12 hr  "tablet, Take 2 tablets by mouth 2 (Two) Times a Day for 30 days., Disp: 120 tablet, Rfl: 0      /74 (BP Location: Left arm, Patient Position: Sitting, Cuff Size: Adult)   Pulse 73   Temp 97.6 °F (36.4 °C) (Infrared)   Ht 188 cm (74.02\")   Wt 109 kg (239 lb 3.2 oz)   SpO2 92%   BMI 30.69 kg/m²      Body mass index is 30.69 kg/m².          Physical Exam  Vitals and nursing note reviewed.   Constitutional:       General: He is not in acute distress.     Appearance: Normal appearance. He is obese. He is ill-appearing. He is not toxic-appearing or diaphoretic.   HENT:      Head: Normocephalic and atraumatic.      Right Ear: Ear canal and external ear normal. There is no impacted cerumen. Tympanic membrane is erythematous and bulging.      Left Ear: Ear canal and external ear normal. There is no impacted cerumen. Tympanic membrane is erythematous and bulging.      Ears:      Comments: Slight Erythema and slight distention noted to bilateral tympanic membrane     Nose: Congestion and rhinorrhea present.      Right Turbinates: Enlarged.      Left Turbinates: Enlarged.      Right Sinus: Maxillary sinus tenderness present. No frontal sinus tenderness.      Left Sinus: Maxillary sinus tenderness present. No frontal sinus tenderness.      Mouth/Throat:      Mouth: Mucous membranes are moist.      Pharynx: Oropharynx is clear.   Eyes:      Extraocular Movements: Extraocular movements intact.      Conjunctiva/sclera: Conjunctivae normal.      Pupils: Pupils are equal, round, and reactive to light.   Cardiovascular:      Rate and Rhythm: Normal rate and regular rhythm.      Pulses: Normal pulses.      Heart sounds: Normal heart sounds.   Pulmonary:      Effort: Pulmonary effort is normal. No respiratory distress.      Breath sounds: No stridor. Wheezing (slight anterior) present. No rhonchi or rales.   Chest:      Chest wall: No tenderness.   Abdominal:      General: Bowel sounds are normal.      Palpations: Abdomen is " soft.   Musculoskeletal:         General: Normal range of motion.      Cervical back: Normal range of motion and neck supple.      Right lower leg: No edema.      Left lower leg: No edema.   Skin:     General: Skin is warm and dry.      Capillary Refill: Capillary refill takes less than 2 seconds.   Neurological:      General: No focal deficit present.      Mental Status: He is alert and oriented to person, place, and time. Mental status is at baseline.      Gait: Gait normal.   Psychiatric:         Mood and Affect: Mood normal.         Behavior: Behavior normal.         Thought Content: Thought content normal.         Judgment: Judgment normal.               Assessment & Plan   Diagnoses and all orders for this visit:    1. Type 2 diabetes mellitus with diabetic polyneuropathy, without long-term current use of insulin (Primary)  -     POC Glycosylated Hemoglobin (Hb A1C)  -     glipizide (GLUCOTROL) 5 MG tablet; Take 1 tablet by mouth Daily As Needed (if blood glucose is greater than 165 consistently).  Dispense: 30 tablet; Refill: 3    2. Essential hypertension    3. Mixed hyperlipidemia    4. Ischemic cardiomyopathy    5. Coronary artery disease involving native coronary artery of native heart without angina pectoris    6. Chronic anticoagulation    7. PAF (paroxysmal atrial fibrillation)    8. Acquired hypothyroidism    9. Class 1 obesity due to excess calories with serious comorbidity and body mass index (BMI) of 30.0 to 30.9 in adult    10. Stage 2 chronic kidney disease    11. Acute bronchitis with chronic obstructive pulmonary disease (COPD)  -     azithromycin (Zithromax Z-Braxton) 250 MG tablet; Take 2 tablets the first day, then 1 tablet daily for 4 days.  Dispense: 6 tablet; Refill: 0  -     guaiFENesin (Mucinex) 600 MG 12 hr tablet; Take 2 tablets by mouth 2 (Two) Times a Day for 30 days.  Dispense: 120 tablet; Refill: 0    12. Restless leg syndrome                 Assessment & Plan  1. Type 2 Diabetes with  Diabetic Polyneuropathy.  His A1c is well-controlled at 5.9%. He uses glipizide 5 mg as needed for glucose levels greater than 165. He is on Farxiga 5 mg daily for CKD and diabetes management, and Victoza 1.8 mg daily. He takes gabapentin 100 mg up to 300 mg at bedtime for polyneuropathy pain. A prescription for glipizide has been refilled. He is advised to continue monitoring his blood sugar levels regularly.  Continue with exercise as tolerated and consistent carbohydrate diet for additional management.  Diabetic eye exam scheduled for December 2024.    2. Essential Hypertension.  His blood pressure is well-controlled at 126/74. He is on lisinopril 10 mg daily. He should continue his current medication regimen.    3. Hyperlipidemia.  His lipid panel in August showed an LDL of 68, which is within the goal of less than 70. No changes to his current management are necessary.    4. Ischemic Cardiomyopathy and CAD.  He should continue his current medication regimen and follow up with his cardiologist as needed.    5. Chronic Anticoagulation in the Setting of Proximal Atrial Fibrillation.  He is on chronic anticoagulation therapy. He should inform the lab about the Z-Braxton prescription when getting his INR checked tomorrow, follows cardiologist at [facility] this includes BULMARO Manley and Dr. Herr    6. Hypothyroidism.  He is on levothyroxine 112 mcg daily. His TSH was normal in August 2024. No recheck is needed today.    7. Class I Obesity.  He should continue with lifestyle modifications to manage his weight.    8. Stage II CKD.  His renal function was stable in August 2024 with a GFR of 62.7. He is on Farxiga 5 mg daily for CKD management, continue with appropriate oral hydration with water as well as avoidance of oral NSAIDs, confirmed he has not been taking Celebrex, this was discontinued.    9. Chronic Bronchitis, Obstructive.  He should continue with his current management plan.    10. Restless Leg  Syndrome.  He reports that his restless legs have not been too bad. Gabapentin seems to help with this condition as well.    11. Sinusitis.  His oxygen saturation is slightly lower than usual (92%), and there is fluid accumulation in his ears. He is advised to use Flonase nasal spray twice daily during the flareup. A prescription for Z-Braxton has been provided. He should continue with the nasal sprays and add/take Mucinex for a couple of weeks. He is encouraged to stay hydrated and take hot steamy showers. Call if symptoms fail to gradually improve, prior to this if they worsen.    12. Acute Gout.  He takes allopurinol 150 mg daily. He has not had any recurrence of gout flareups since his last visit. He should continue with the current dosage unless he experiences more flareups.    13. Medication Management.  He is no longer taking Celebrex, and it will be removed from his medication list. He continues to take omeprazole daily for his stomach, which seems to be working well.      Patient or patient representative verbalized consent for the use of Ambient Listening during the visit with  BULMARO De Santiago for chart documentation. 11/21/2024  09:21 CST    Please note that portions of this note were completed with a voice recognition program.     Electronically signed by BULMARO De Santiago, 11/21/24, 09:23 CST.

## 2024-11-21 NOTE — TELEPHONE ENCOUNTER
Patient was advised of this risk, he is scheduled for an INR check tomorrow with his cardiologist, okay to proceed with the azithromycin.

## 2024-11-21 NOTE — TELEPHONE ENCOUNTER
Pharmacy Name:  [facility]       Pharmacy representative name: FRANCIE    Pharmacy representative phone number:  [phone]     What medication are you calling in regards to: JOSE L SLATER    What question does the pharmacy have: INTERACTION WITH HIS WARFARIN, BLEEDING MAY OCCUR WITH THE AZITHROMYCIN.  PLEASE CALL BACK WITH ADVISEMENT.     Who is the provider that prescribed the medication: CUCA ALDANA

## 2024-11-22 ENCOUNTER — ANTI-COAG VISIT (OUTPATIENT)
Dept: CARDIOLOGY CLINIC | Age: 69
End: 2024-11-22

## 2024-11-22 DIAGNOSIS — Z79.01 LONG TERM (CURRENT) USE OF ANTICOAGULANTS: ICD-10-CM

## 2024-11-22 DIAGNOSIS — I48.0 PAF (PAROXYSMAL ATRIAL FIBRILLATION) (HCC): Primary | ICD-10-CM

## 2024-11-22 LAB
INTERNATIONAL NORMALIZATION RATIO, POC: 3.5
PROTHROMBIN TIME, POC: 34.9

## 2024-11-22 NOTE — PROGRESS NOTES
Anticoagulation Summary  As of 11/22/2024      INR goal:  2.0-3.0   TTR:  59.1% (13.4 y)   INR used for dosing:  3.5 (11/22/2024)   Warfarin maintenance plan:  7.5 mg (5 mg x 1.5) every Mon, Wed, Fri; 10 mg (5 mg x 2) all other days   Weekly warfarin total:  62.5 mg   Plan last modified:  Leora Berry MA (3/29/2022)   Next INR check:  12/13/2024   Target end date:  --             Anticoagulation Episode Summary       INR check location:  --    Preferred lab:  --    Send INR reminders to:  [facility]    Comments:  2-2.5            Anticoagulation Care Providers       Provider Role Specialty Phone number    Cece Becker APRN  Family Nurse Practitioner [phone]                Dosing Plan  As of 11/22/2024      TTR:  59.1% (13.4 y)   Full warfarin instructions:  11/22: Hold; 11/24: 5 mg; 11/26: 5 mg; Otherwise 7.5 mg every Mon, Wed, Fri; 10 mg all other days               Made Inder Morton aware of findings by phone.     Electronically signed by BEVERLY Meyer NP on 11/22/24 at 8:31 AM CST

## 2024-12-03 NOTE — PROGRESS NOTES
Patient called and is concerned because she had surgery in July and lifted something and is now having pain on the right side    Please call  [phone]   The ABCs of the Annual Wellness Visit  Initial Medicare Wellness Visit    Chief Complaint   Patient presents with   • Medicare Wellness-Initial Visit       Subjective   History of Present Illness:  Jn Cárdenas is a 65 y.o. male who presents for an Initial Medicare Wellness Visit.    HEALTH RISK ASSESSMENT    Recent Hospitalizations:  No hospitalization(s) within the last year.    Current Medical Providers:  Patient Care Team:  Dawood Herrera MD as PCP - General (Internal Medicine)  Armond Leon MD as Consulting Physician (Otolaryngology)    Smoking Status:  Social History     Tobacco Use   Smoking Status Never Smoker   Smokeless Tobacco Never Used       Alcohol Consumption:  Social History     Substance and Sexual Activity   Alcohol Use No       Depression Screen:   PHQ-2/PHQ-9 Depression Screening 5/25/2021   Little interest or pleasure in doing things 0   Feeling down, depressed, or hopeless 0   Total Score 0       Fall Risk Screen:  BENSON Fall Risk Assessment was completed, and patient is at LOW risk for falls.Assessment completed on:5/25/2021    Health Habits and Functional and Cognitive Screening:  Functional & Cognitive Status 5/25/2021   Do you have difficulty preparing food and eating? No   Do you have difficulty bathing yourself, getting dressed or grooming yourself? No   Do you have difficulty using the toilet? No   Do you have difficulty moving around from place to place? No   Do you have trouble with steps or getting out of a bed or a chair? No   Current Diet Well Balanced Diet   Dental Exam Up to date   Eye Exam Not up to date   Exercise (times per week) 7 times per week   Current Exercise Activities Include Walking   Do you need help using the phone?  No   Are you deaf or do you have serious difficulty hearing?  No   Do you need help with transportation? No   Do you need help shopping? No   Do you need help preparing meals?  No   Do you need help with housework?  No   Do you need help with  laundry? No   Do you need help taking your medications? No   Do you need help managing money? No   Do you ever drive or ride in a car without wearing a seat belt? No   Have you felt unusual stress, anger or loneliness in the last month? No   Who do you live with? Spouse   If you need help, do you have trouble finding someone available to you? No   Have you been bothered in the last four weeks by sexual problems? No   Do you have difficulty concentrating, remembering or making decisions? No         Does the patient have evidence of cognitive impairment? No    Asprin use counseling:Does not need ASA (and currently is not on it)    Age-appropriate Screening Schedule:  Refer to the list below for future screening recommendations based on patient's age, sex and/or medical conditions. Orders for these recommended tests are listed in the plan section. The patient has been provided with a written plan.    Health Maintenance   Topic Date Due   • TDAP/TD VACCINES (1 - Tdap) Never done   • ZOSTER VACCINE (1 of 2) Never done   • INFLUENZA VACCINE  08/01/2021   • HEMOGLOBIN A1C  11/20/2021   • DIABETIC FOOT EXAM  11/30/2021   • URINE MICROALBUMIN  11/30/2021   • DIABETIC EYE EXAM  12/01/2021   • LIPID PANEL  05/20/2022          The following portions of the patient's history were reviewed and updated as appropriate: allergies, current medications, past family history, past medical history, past social history, past surgical history and problem list.    Outpatient Medications Prior to Visit   Medication Sig Dispense Refill   • allopurinol (ZYLOPRIM) 300 MG tablet TAKE 1/2 (ONE-HALF) TABLET BY MOUTH ONCE DAILY IN THE EVENING 45 tablet 3   • baclofen (LIORESAL) 10 MG tablet TAKE 1 TABLET BY MOUTH EVERY NIGHT. CAN TAKE UP TO 3 TABLETS IF NEEDED NIGHTLY 90 tablet 3   • celecoxib (CeleBREX) 100 MG capsule Take 1 capsule by mouth once daily 90 capsule 3   • Chromium (Cr-GTF) 200 MCG capsule Take 1 capsule by mouth 2 (Two) Times a Day.      • CloNIDine (CATAPRES) 0.1 MG tablet Take 0.1 mg by mouth Daily.     • Dapagliflozin Propanediol (Farxiga) 10 MG tablet Take 1 tablet by mouth Daily.     • Diphenhydramine-PSE-APAP (ALLERGY SINUS HEADACHE RELIEF PO) Take 1 tablet by mouth Every Night. May take 1-2 daily for sinus headache     • EQL CINNAMON PO Take 1,000 mg by mouth Daily.     • fluticasone (VERAMYST) 27.5 MCG/SPRAY nasal spray 2 sprays into each nostril Daily.     • gabapentin (NEURONTIN) 100 MG capsule TAKE 1 TO 3 CAPSULES BY MOUTH EVERY NIGHT AT BEDTIME AS NEEDED 90 capsule 5   • glucose blood (OneTouch Ultra) test strip 1 each by Other route 2 (two) times a day. Use as instructed 100 each 5   • levothyroxine (SYNTHROID, LEVOTHROID) 112 MCG tablet Take 1 tablet by mouth once daily 90 tablet 3   • Liraglutide (VICTOZA) 18 MG/3ML solution pen-injector Inject 1.8 pens under the skin Every Night.     • Multiple Vitamins-Minerals (ALIVE MENS ENERGY) tablet Take 1 tablet by mouth Daily.     • NON FORMULARY 1 application As Needed (is over the counter rollon applies to  legs prn  max freeze).     • NON FORMULARY Take 2 tablets by mouth Daily. Healthy feet and nerves     • omeprazole (priLOSEC) 40 MG capsule Take 40 mg by mouth Daily.     • warfarin (COUMADIN) 5 MG tablet Take 5 mg by mouth Daily. Take 7.5mg 3 days weekly, and 5mg 4 days weekly       No facility-administered medications prior to visit.       Patient Active Problem List   Diagnosis   • Arthritis   • CAD (coronary artery disease)   • Acute myocardial infarction, initial episode of care (CMS/HCC)   • Cardiomyopathy (CMS/HCC)   • Cerebral artery occlusion with cerebral infarction (CMS/HCC)   • Chronic anticoagulation   • PAF (paroxysmal atrial fibrillation) (CMS/HCC)   • Chronic bronchitis, obstructive (CMS/HCC)   • Type 2 diabetes mellitus with diabetic polyneuropathy, without long-term current use of insulin (CMS/[facility])   • Diabetic neuropathy, painful (CMS/HCC)   • Dysmetabolic syndrome    • Essential hypertension   • Gout   • Hammer toes of both feet   • Paraesophageal hiatal hernia   • History of colon polyps   • Irritable bowel syndrome   • Low back pain   • Mixed hyperlipidemia   • Peripheral nerve disease   • Restless legs syndrome   • Seasonal allergic rhinitis, unspecified allergic rhinitis trigger   • Callus of heel   • Hypercholesteremia   • Acquired hypothyroidism       Advanced Care Planning:  ACP discussion was held with the patient during this visit. Patient has an advance directive in EMR which is still valid.     Review of Systems   Constitutional: Negative for activity change, appetite change and chills.   HENT: Negative for congestion, ear pain and facial swelling.    Eyes: Negative for pain, discharge and itching.   Respiratory: Negative for apnea, cough and shortness of breath.    Cardiovascular: Negative for chest pain, palpitations and leg swelling.   Gastrointestinal: Negative for abdominal distention, abdominal pain and anal bleeding.   Endocrine: Negative for cold intolerance and heat intolerance.   Genitourinary: Negative for difficulty urinating, dysuria and flank pain.   Musculoskeletal: Negative for arthralgias, back pain and joint swelling.   Skin: Negative for color change, pallor and rash.   Allergic/Immunologic: Negative for environmental allergies and food allergies.   Neurological: Negative for dizziness and facial asymmetry.   Hematological: Negative for adenopathy. Does not bruise/bleed easily.   Psychiatric/Behavioral: Negative for agitation, behavioral problems and confusion.       Compared to one year ago, the patient feels his physical health is the same.  Compared to one year ago, the patient feels his mental health is the same.    Reviewed chart for potential of high risk medication in the elderly: yes  Reviewed chart for potential of harmful drug interactions in the elderly:yes    Objective         Vitals:    05/25/21 0929   BP: 148/98  Comment: [facility] not [facility]  [place] [place]   BP Location: Left arm   Patient Position: Sitting   Cuff Size: Adult   Pulse: 74   Temp: 97.9 °F (36.6 °C)   TempSrc: Temporal   SpO2: 99%   Weight: 94.3 kg (208 lb)   Height: 188 cm (74\")   PainSc: 0-No pain       Body mass index is 26.71 kg/m².  Discussed the patient's BMI with him. The BMI is in the acceptable range.    Physical Exam  Vitals and nursing note reviewed.   Constitutional:       General: He is not in acute distress.     Appearance: Normal appearance. He is well-developed.   HENT:      Head: Normocephalic and atraumatic.      Right Ear: External ear normal.      Left Ear: External ear normal.      Nose: Nose normal.   Eyes:      Extraocular Movements: Extraocular movements intact.      Conjunctiva/sclera: Conjunctivae normal.      Pupils: Pupils are equal, round, and reactive to light.   Cardiovascular:      Rate and Rhythm: Normal rate and regular rhythm.      Pulses:           Dorsalis pedis pulses are 1+ on the right side.        Posterior tibial pulses are 1+ on the right side and 1+ on the left side.      Heart sounds: Normal heart sounds.   Pulmonary:      Effort: Pulmonary effort is normal.      Breath sounds: Normal breath sounds.   Abdominal:      General: Bowel sounds are normal.      Palpations: Abdomen is soft.   Musculoskeletal:         General: Normal range of motion.      Cervical back: Normal range of motion and neck supple. No rigidity. No muscular tenderness.   Feet:      Right foot:      Protective Sensation: 2 sites tested. 2 sites sensed.      Toenail Condition: Fungal disease present.     Left foot:      Protective Sensation: 2 sites tested. 2 sites sensed.      Toenail Condition: Fungal disease present.  Skin:     General: Skin is warm and dry.   Neurological:      General: No focal deficit present.      Mental Status: He is alert and oriented to person, place, and time.   Psychiatric:         Mood and Affect: Mood normal.         Behavior: " Behavior normal.         Lab Results   Component Value Date     (H) 05/20/2021    CHLPL 143 05/20/2021    TRIG 317 (H) 05/20/2021    HDL 29 (L) 05/20/2021    LDL 64 05/20/2021    VLDL 50 (H) 05/20/2021    HGBA1C 5.90 (H) 05/20/2021        Assessment/Plan   Medicare Risks and Personalized Health Plan  CMS Preventative Services Quick Reference  Advance Directive Discussion  Cardiovascular risk  Colon Cancer Screening  Diabetic Lab Screening   Immunizations Discussed/Encouraged (specific immunizations; Tdap, Pneumococcal 23, Shingrix and COVID19 )  Prostate Cancer Screening     The above risks/problems have been discussed with the patient.  Pertinent information has been shared with the patient in the After Visit Summary.  Follow up plans and orders are seen below in the Assessment/Plan Section.    Diagnoses and all orders for this visit:    1. Type 2 diabetes mellitus with diabetic polyneuropathy, without long-term current use of insulin (CMS/[facility]) (Primary)    2. Diabetic neuropathy, painful (CMS/[facility])    3. Hypercholesteremia    4. Essential hypertension    Other orders  -     lisinopril (PRINIVIL,ZESTRIL) 10 MG tablet; Take 1 tablet by mouth Daily.  Dispense: 90 tablet; Refill: 3      Follow Up:  Return in about 6 months (around 11/25/2021).  At this point patient is doing well with his current medications he is not taking his blood pressure medicine today his blood pressure is slightly elevated I also saw that he was not on statin therapy so I have added lisinopril 10 mg at bedtime.  We will have him come back in 6 months for routine follow-up    An After Visit Summary and PPPS were given to the patient.

## 2024-12-13 ENCOUNTER — ANTI-COAG VISIT (OUTPATIENT)
Dept: CARDIOLOGY CLINIC | Age: 69
End: 2024-12-13

## 2024-12-13 DIAGNOSIS — Z79.01 LONG TERM (CURRENT) USE OF ANTICOAGULANTS: ICD-10-CM

## 2024-12-13 DIAGNOSIS — I48.0 PAF (PAROXYSMAL ATRIAL FIBRILLATION) (HCC): Primary | ICD-10-CM

## 2024-12-13 LAB
INTERNATIONAL NORMALIZATION RATIO, POC: 2.8
PROTHROMBIN TIME, POC: 29.3

## 2024-12-13 NOTE — PROGRESS NOTES
Anticoagulation Summary  As of 2024      INR goal:  2.0-3.0   TTR:  59.0% (13.5 y)   INR used for dosin.8 (2024)   Warfarin maintenance plan:  7.5 mg (5 mg x 1.5) every Mon, Wed, Fri; 10 mg (5 mg x 2) all other days   Weekly warfarin total:  62.5 mg   Plan last modified:  Leora Berry MA (3/29/2022)   Next INR check:  2025   Target end date:  --             Anticoagulation Episode Summary       INR check location:  --    Preferred lab:  --    Send INR reminders to:  [facility]    Comments:  2-2.5            Anticoagulation Care Providers       Provider Role Specialty Phone number    Cece Becker APRN  Family Nurse Practitioner [phone]                Dosing Plan  As of 2024      TTR:  59.0% (13.5 y)   Full warfarin instructions:  7.5 mg every Mon, Wed, Fri; 10 mg all other days               Made Inder Morton aware of findings by phone.     Electronically signed by BEVERLY Dejesus on 24 at 9:28 AM CST

## 2024-12-17 DIAGNOSIS — E11.40 DIABETIC NEUROPATHY, PAINFUL: ICD-10-CM

## 2024-12-17 RX ORDER — GABAPENTIN 100 MG/1
CAPSULE ORAL
Qty: 90 CAPSULE | Refills: 5 | Status: SHIPPED | OUTPATIENT
Start: 2024-12-17

## 2025-01-10 DIAGNOSIS — E79.0 ELEVATED URIC ACID IN BLOOD: ICD-10-CM

## 2025-01-13 RX ORDER — ALLOPURINOL 300 MG/1
TABLET ORAL
Qty: 90 TABLET | Refills: 3 | Status: SHIPPED | OUTPATIENT
Start: 2025-01-13

## 2025-01-14 ENCOUNTER — OFFICE VISIT (OUTPATIENT)
Dept: CARDIOLOGY CLINIC | Age: 70
End: 2025-01-14
Payer: MEDICARE

## 2025-01-14 VITALS
WEIGHT: 236 LBS | OXYGEN SATURATION: 93 % | HEIGHT: 74 IN | SYSTOLIC BLOOD PRESSURE: 124 MMHG | DIASTOLIC BLOOD PRESSURE: 82 MMHG | BODY MASS INDEX: 30.29 KG/M2 | HEART RATE: 76 BPM

## 2025-01-14 DIAGNOSIS — I10 ESSENTIAL HYPERTENSION: ICD-10-CM

## 2025-01-14 DIAGNOSIS — Z79.01 LONG TERM (CURRENT) USE OF ANTICOAGULANTS: ICD-10-CM

## 2025-01-14 DIAGNOSIS — I48.0 PAF (PAROXYSMAL ATRIAL FIBRILLATION) (HCC): Primary | ICD-10-CM

## 2025-01-14 DIAGNOSIS — I25.10 CORONARY ARTERY DISEASE INVOLVING NATIVE CORONARY ARTERY OF NATIVE HEART WITHOUT ANGINA PECTORIS: ICD-10-CM

## 2025-01-14 DIAGNOSIS — E78.5 DYSLIPIDEMIA: ICD-10-CM

## 2025-01-14 LAB
INTERNATIONAL NORMALIZATION RATIO, POC: 2.7
PROTHROMBIN TIME, POC: NORMAL

## 2025-01-14 PROCEDURE — 99214 OFFICE O/P EST MOD 30 MIN: CPT | Performed by: CLINICAL NURSE SPECIALIST

## 2025-01-14 PROCEDURE — 3079F DIAST BP 80-89 MM HG: CPT | Performed by: CLINICAL NURSE SPECIALIST

## 2025-01-14 PROCEDURE — G8427 DOCREV CUR MEDS BY ELIG CLIN: HCPCS | Performed by: CLINICAL NURSE SPECIALIST

## 2025-01-14 PROCEDURE — 1123F ACP DISCUSS/DSCN MKR DOCD: CPT | Performed by: CLINICAL NURSE SPECIALIST

## 2025-01-14 PROCEDURE — 93000 ELECTROCARDIOGRAM COMPLETE: CPT | Performed by: CLINICAL NURSE SPECIALIST

## 2025-01-14 PROCEDURE — G8417 CALC BMI ABV UP PARAM F/U: HCPCS | Performed by: CLINICAL NURSE SPECIALIST

## 2025-01-14 PROCEDURE — 85610 PROTHROMBIN TIME: CPT | Performed by: CLINICAL NURSE SPECIALIST

## 2025-01-14 PROCEDURE — 36415 COLL VENOUS BLD VENIPUNCTURE: CPT | Performed by: CLINICAL NURSE SPECIALIST

## 2025-01-14 PROCEDURE — 1159F MED LIST DOCD IN RCRD: CPT | Performed by: CLINICAL NURSE SPECIALIST

## 2025-01-14 PROCEDURE — 3017F COLORECTAL CA SCREEN DOC REV: CPT | Performed by: CLINICAL NURSE SPECIALIST

## 2025-01-14 PROCEDURE — 3074F SYST BP LT 130 MM HG: CPT | Performed by: CLINICAL NURSE SPECIALIST

## 2025-01-14 PROCEDURE — 1036F TOBACCO NON-USER: CPT | Performed by: CLINICAL NURSE SPECIALIST

## 2025-01-14 PROCEDURE — 1160F RVW MEDS BY RX/DR IN RCRD: CPT | Performed by: CLINICAL NURSE SPECIALIST

## 2025-01-14 NOTE — PROGRESS NOTES
Encounters:   01/14/25 124/82   07/24/24 126/78   02/28/24 (!) 142/76    Pulse Readings from Last 3 Encounters:   01/14/25 76   07/24/24 92   02/28/24 92        Wt Readings from Last 3 Encounters:   01/14/25 107 kg (236 lb)   07/24/24 107 kg (236 lb)   02/28/24 108.5 kg (239 lb 4 oz)   EKG today shows normal sinus rhythm with rate of 76.  Left axis.  Stable EKG    Hypertension    Blood pressure and heart rate well controlled.  Medical management includes ACE inhibitor.  Has clonidine to take as needed.       PAF  EKG showed no arrhythmia.  No sustained arrhythmia noted   Remains anticoagulated on Coumadin.  No abnormal bleeding      Lab Results   Component Value Date    INR 2.7 01/14/2025    INR 2.8 12/13/2024    INR 3.5 11/22/2024    PROTIME  01/14/2025      Comment:      pt dosed    PROTIME 29.3 12/13/2024    PROTIME 34.9 11/22/2024 July 2023 monitor worn for 11 days and 19 hours.  3 episodes of SVT noted longest lasting 17 beats.  Average heart rate 87 range 51-2 01.  Rare ectopy      CAD  Prior MI  Remains on statin and anticoagulated on Coumadin due to previous TIAs    Coleen scan 9/27/2023 showed no evidence of ischemia or infarction  2D echo 9/27/2023   Left ventricular size is normal. Left ventricular ejection fraction is   estimated at 50-55%. Endocardial borders not well visualized, cannot   exclude subtle regional wall motion abnormalities. Moderate concentric   left ventricular hypertrophy. Normal diastolic function.   Normal right ventricular size with preserved RV function (TAPSE 25 mm).   Normal bi-atrial size.   Trivial tricuspid regurgitation with estimated RVSP of 24 mmHg.   The visualized aorta is within normal limits.   IVC normal.   No evidence of significant pericardial effusion is noted.   The rhythm is sinus.  ---------------------------------------   Electronically signed by Molina Elaine(Interpreting physician)   on 09/27/2023 11:54 AM       States taking medications as prescribed  Stable

## 2025-01-14 NOTE — PATIENT INSTRUCTIONS
Continue same coumadin dose and recheck in 6 weeks   Maintain good blood pressure control-goal<130/80 at rest  Maintain good cholesterol control LDL goal<70 with arterial disease  If you are diabetic work to keep/obtain hemoglobin A1c< 7    Follow up in October with Dr. Chavira   Call with any questions or concerns  Follow up with Marysol Gavin APRN - NP for non cardiac problems  Report any new problems  Cardiovascular Fitness-Exercise as tolerated.  Strive for 30 minutes of exercise most days of the week.    Cardiac / Healthy Diet- Avoid processed high fat foods, maintain low sodium/salt   Continue current medications as directed  Continue plan of treatment  It is always recommended that you bring your medications bottles with you to each visit - this is for your safety!

## 2025-01-31 DIAGNOSIS — G25.81 RESTLESS LEG SYNDROME: ICD-10-CM

## 2025-01-31 RX ORDER — BACLOFEN 10 MG/1
TABLET ORAL
Qty: 90 TABLET | Refills: 2 | Status: SHIPPED | OUTPATIENT
Start: 2025-01-31

## 2025-02-06 RX ORDER — LISINOPRIL 10 MG/1
TABLET ORAL
Qty: 90 TABLET | Refills: 3 | Status: SHIPPED | OUTPATIENT
Start: 2025-02-06

## 2025-02-21 ENCOUNTER — OFFICE VISIT (OUTPATIENT)
Dept: INTERNAL MEDICINE | Facility: CLINIC | Age: 70
End: 2025-02-21
Payer: MEDICARE

## 2025-02-21 VITALS
HEIGHT: 74 IN | DIASTOLIC BLOOD PRESSURE: 82 MMHG | SYSTOLIC BLOOD PRESSURE: 120 MMHG | BODY MASS INDEX: 31.42 KG/M2 | TEMPERATURE: 97.8 F | WEIGHT: 244.8 LBS | HEART RATE: 76 BPM | OXYGEN SATURATION: 94 %

## 2025-02-21 DIAGNOSIS — I10 ESSENTIAL HYPERTENSION: ICD-10-CM

## 2025-02-21 DIAGNOSIS — E66.811 CLASS 1 OBESITY DUE TO EXCESS CALORIES WITH SERIOUS COMORBIDITY AND BODY MASS INDEX (BMI) OF 31.0 TO 31.9 IN ADULT: ICD-10-CM

## 2025-02-21 DIAGNOSIS — E66.09 CLASS 1 OBESITY DUE TO EXCESS CALORIES WITH SERIOUS COMORBIDITY AND BODY MASS INDEX (BMI) OF 31.0 TO 31.9 IN ADULT: ICD-10-CM

## 2025-02-21 DIAGNOSIS — E11.42 TYPE 2 DIABETES MELLITUS WITH DIABETIC POLYNEUROPATHY, WITHOUT LONG-TERM CURRENT USE OF INSULIN: Primary | ICD-10-CM

## 2025-02-21 DIAGNOSIS — Z87.39 PERSONAL HISTORY OF GOUT: ICD-10-CM

## 2025-02-21 DIAGNOSIS — I25.10 CORONARY ARTERY DISEASE INVOLVING NATIVE CORONARY ARTERY OF NATIVE HEART WITHOUT ANGINA PECTORIS: ICD-10-CM

## 2025-02-21 DIAGNOSIS — E03.9 ACQUIRED HYPOTHYROIDISM: ICD-10-CM

## 2025-02-21 DIAGNOSIS — N18.2 STAGE 2 CHRONIC KIDNEY DISEASE: ICD-10-CM

## 2025-02-21 DIAGNOSIS — I25.5 ISCHEMIC CARDIOMYOPATHY: ICD-10-CM

## 2025-02-21 DIAGNOSIS — B35.4 TINEA CORPORIS: ICD-10-CM

## 2025-02-21 DIAGNOSIS — E78.2 MIXED HYPERLIPIDEMIA: ICD-10-CM

## 2025-02-21 DIAGNOSIS — I48.0 PAF (PAROXYSMAL ATRIAL FIBRILLATION): ICD-10-CM

## 2025-02-21 DIAGNOSIS — Z79.01 CHRONIC ANTICOAGULATION: ICD-10-CM

## 2025-02-21 RX ORDER — GLUCAGON INJECTION, SOLUTION 1 MG/.2ML
1 INJECTION, SOLUTION SUBCUTANEOUS ONCE AS NEEDED
Qty: 0.4 ML | Refills: 1 | Status: SHIPPED | OUTPATIENT
Start: 2025-02-21

## 2025-02-21 RX ORDER — CLOTRIMAZOLE AND BETAMETHASONE DIPROPIONATE 10; .64 MG/G; MG/G
CREAM TOPICAL 2 TIMES DAILY
Qty: 90 G | Refills: 1 | Status: SHIPPED | OUTPATIENT
Start: 2025-02-21

## 2025-02-21 NOTE — PROGRESS NOTES
Subjective   Jn Cárdenas is a 69 y.o. male.   Chief Complaint   Patient presents with    Hypertension     3 mo f/u;  Denies chest pains and SOB.     Diabetes     3 mo f/u;  A1c - 6.0%          History of Present Illness  The patient is a 69-year-old male who presents to the office today for a 3-month follow-up on management of his type 2 diabetes with diabetic polyneuropathy. He has kept his diabetes under great control. He has stricture parameters for himself than what we do. He likes to maintain an A1c less than 6% when it was last evaluated November 2024. It was 5.9%. He is on Victoza 1.8 mg daily. He is on glipizide 5 mg as needed if blood glucose is greater than 165 consistently. He is on Farxiga 5 mg daily. This is for treatment of both his type 2 diabetes as well as stage II chronic kidney disease. He is on gabapentin 100 mg 1 to 3 capsules up to once daily at or at bedtime, sorry, clarification. For treatment of his polyneuropathy he has hypothyroidism and is on levothyroxine 112 mcg daily. He also has fairly significant cardiac history and that he has proximal atrial fibrillation. He is on warfarin therapy. He does follow with a cardiologist. He also has ischemic cardiomyopathy, hypertension, hyperlipidemia, coronary artery disease, personal history of gout, is on allopurinol.    He reports satisfactory control of his blood glucose levels at home, with occasional hypoglycemic episodes. These episodes are not frequent but have been noted in the 80s and 70s range. He acknowledges a correlation between these low readings and the administration of glipizide. He recalls an instance where his blood glucose level was 157 upon waking, which subsequently increased to 175 prior to eating and remained elevated for the majority of the day. He continues to tolerate Farxiga 5 mg without any adverse effects such as rash or recurrence of symptoms. His urine output remains within normal limits. He reports no chest pain  or shortness of breath.    He has a significant cardiac history, including proximal atrial fibrillation, and is on warfarin therapy. He follows up with a cardiologist and has an appointment scheduled for next week. He reports no issues with warfarin or bleeding.    He has a personal history of gout and is on allopurinol. He reports no recent gout flare-ups.    He is on gabapentin 100 mg, taking 1 to 3 capsules as needed at bedtime, for the treatment of his polyneuropathy. He typically takes one capsule and has only taken two capsules once or twice since starting the medication.    He has hypothyroidism and is on levothyroxine 112 mcg daily.    He has stage II chronic kidney disease and is on Farxiga 5 mg daily.    He has hypertension.    He has hyperlipidemia.    He has coronary artery disease.    Supplemental Information  He had a stomach virus and was running a temperature, but he survived it without any major complications.    MEDICATIONS  Victoza, glipizide, Farxiga, gabapentin, levothyroxine, warfarin, allopurinol.       The following portions of the patient's history were reviewed and updated as appropriate: allergies, current medications, past family history, past medical history, past social history, past surgical history and problem list.    Review of Systems    Objective   Past Medical History:   Diagnosis Date    Arthritis     Cataract     Coronary artery disease     Diabetes mellitus     Disease of thyroid gland     Gout     Hypertension     Myocardial infarct, old     24 yrs ago    Neuropathy     in feet and legs    Parotid tumor     Spastic colon     TIA (transient ischemic attack)     weakness on left upper extremity      Past Surgical History:   Procedure Laterality Date    BACK SURGERY      had spinal fusion    OTHER SURGICAL HISTORY      had goiter removed  at 4 yrs old    OTHER SURGICAL HISTORY Right     had broken arm,    PAROTIDECTOMY Left 3/21/2017    Procedure: PAROTID TUMOR EXCISION WITH  FACIAL NERVE MONITORING,  DISSECTION LEFT;  Surgeon: Armond Leon MD;  Location: [facility] OR;  Service:         Current Outpatient Medications:     allopurinol (ZYLOPRIM) 300 MG tablet, Take 1 tablet by mouth once daily, Disp: 90 tablet, Rfl: 3    baclofen (LIORESAL) 10 MG tablet, TAKE UP TO 3 TABLETS BY MOUTH NIGHTLY IF NEEDED, Disp: 90 tablet, Rfl: 2    Bromelains (BROMELAIN PO), Take 1 capsule by mouth Daily., Disp: , Rfl:     Chromium 200 MCG capsule, Take 200 mcg by mouth 2 (Two) Times a Day., Disp: , Rfl:     CloNIDine (CATAPRES) 0.1 MG tablet, Take 1 tablet by mouth Daily., Disp: , Rfl:     clotrimazole-betamethasone (LOTRISONE) 1-0.05 % cream, Apply  topically to the appropriate area as directed 2 (Two) Times a Day., Disp: 90 g, Rfl: 1    Diphenhydramine-PSE-APAP (ALLERGY SINUS HEADACHE RELIEF PO), Take 1 tablet by mouth Every Night. May take 1-2 daily for sinus headache, Disp: , Rfl:     EQL CINNAMON PO, Take 1,000 mg by mouth Daily., Disp: , Rfl:     Farxiga 5 MG tablet tablet, Take 1 tablet by mouth once daily, Disp: 90 tablet, Rfl: 1    fluticasone (VERAMYST) 27.5 MCG/SPRAY nasal spray, Administer 2 sprays into the nostril(s) as directed by provider Daily., Disp: , Rfl:     gabapentin (NEURONTIN) 100 MG capsule, TAKE 1 TO 3 CAPSULES BY MOUTH ONCE DAILY AT BEDTIME AS NEEDED FOR NEUROPATHY, Disp: 90 capsule, Rfl: 5    glipizide (GLUCOTROL) 5 MG tablet, Take 1 tablet by mouth Daily As Needed (if blood glucose is greater than 165 consistently)., Disp: 30 tablet, Rfl: 3    levothyroxine (SYNTHROID, LEVOTHROID) 112 MCG tablet, Take 1 tablet by mouth once daily, Disp: 90 tablet, Rfl: 3    Liraglutide (VICTOZA) 18 MG/3ML solution pen-injector injection, Inject 1.8 mg under the skin into the appropriate area as directed Daily., Disp: 24 mL, Rfl: 3    lisinopril (PRINIVIL,ZESTRIL) 10 MG tablet, Take 1 tablet by mouth once daily, Disp: 90 tablet, Rfl: 3    Multiple Vitamins-Minerals (ALIVE MENS ENERGY)  "tablet, Take 1 tablet by mouth Daily., Disp: , Rfl:     NON FORMULARY, 1 application  As Needed (is over the counter rollon applies to  legs prn  max freeze). Bio freeze, Disp: , Rfl:     NON FORMULARY, Take 2 tablets by mouth Daily. Healthy feet and nerves, Disp: , Rfl:     nystatin 188504 UNIT/GM powder, APPLY TOPICALLY TO THE APPROPRIATE AREA AS DIRECTED 3 TIMES A DAY, Disp: 60 g, Rfl: 3    omeprazole (priLOSEC) 40 MG capsule, Take 1 capsule by mouth Daily., Disp: , Rfl:     OneTouch Ultra test strip, USE 1 TEST STRIP TO CHECK BLOOD SUGAR TWICE A DAY AS INSTRUCTED, Disp: 100 each, Rfl: 5    warfarin (COUMADIN) 5 MG tablet, Take 1 tablet by mouth Daily. Take 7.5mg 3 days weekly, and  10 mg 4 days weekly, Disp: , Rfl:     Glucagon (Gvoke HypoPen 2-Pack) 1 MG/0.2ML solution auto-injector, Inject 1 mg under the skin into the appropriate area as directed 1 (One) Time As Needed (hypoglycemia) for up to 1 dose., Disp: 0.4 mL, Rfl: 1      /82 (BP Location: Left arm, Patient Position: Sitting, Cuff Size: Adult)   Pulse 76   Temp 97.8 °F (36.6 °C) (Infrared)   Ht 188 cm (74.02\")   Wt 111 kg (244 lb 12.8 oz)   SpO2 94%   BMI 31.41 kg/m²      Body mass index is 31.41 kg/m².          Physical Exam  Vitals and nursing note reviewed.   Constitutional:       General: He is not in acute distress.     Appearance: Normal appearance. He is obese. He is not ill-appearing, toxic-appearing or diaphoretic.   HENT:      Head: Normocephalic and atraumatic.   Eyes:      Extraocular Movements: Extraocular movements intact.      Conjunctiva/sclera: Conjunctivae normal.      Pupils: Pupils are equal, round, and reactive to light.   Cardiovascular:      Rate and Rhythm: Normal rate and regular rhythm.      Pulses: Normal pulses.      Heart sounds: Normal heart sounds.   Pulmonary:      Effort: Pulmonary effort is normal.      Breath sounds: Normal breath sounds.   Abdominal:      General: Bowel sounds are normal.      Palpations: " Abdomen is soft.   Musculoskeletal:         General: Normal range of motion.      Cervical back: Normal range of motion and neck supple.      Right lower leg: No edema.      Left lower leg: No edema.   Skin:     General: Skin is warm and dry.   Neurological:      General: No focal deficit present.      Mental Status: He is alert and oriented to person, place, and time. Mental status is at baseline.      Gait: Gait normal.   Psychiatric:         Mood and Affect: Mood normal.         Behavior: Behavior normal.         Thought Content: Thought content normal.         Judgment: Judgment normal.               Assessment & Plan   Diagnoses and all orders for this visit:    1. Type 2 diabetes mellitus with diabetic polyneuropathy, without long-term current use of insulin (Primary)  -     POC Glycosylated Hemoglobin (Hb A1C)  -     Glucagon (Gvoke HypoPen 2-Pack) 1 MG/0.2ML solution auto-injector; Inject 1 mg under the skin into the appropriate area as directed 1 (One) Time As Needed (hypoglycemia) for up to 1 dose.  Dispense: 0.4 mL; Refill: 1    2. Acquired hypothyroidism    3. Class 1 obesity due to excess calories with serious comorbidity and body mass index (BMI) of 31.0 to 31.9 in adult    4. Stage 2 chronic kidney disease  -     Basic metabolic panel    5. Coronary artery disease involving native coronary artery of native heart without angina pectoris    6. PAF (paroxysmal atrial fibrillation)    7. Chronic anticoagulation    8. Ischemic cardiomyopathy    9. Essential hypertension  -     Basic metabolic panel    10. Mixed hyperlipidemia  -     Lipid panel    11. Personal history of gout    12. Tinea corporis  -     clotrimazole-betamethasone (LOTRISONE) 1-0.05 % cream; Apply  topically to the appropriate area as directed 2 (Two) Times a Day.  Dispense: 90 g; Refill: 1                 Assessment & Plan  1. Type 2 diabetes mellitus with diabetic polyneuropathy.  His A1c level is commendably maintained at 6.0%,  indicating excellent control of his diabetes. However, he has experienced some hypoglycemic episodes, with blood glucose levels dropping into the 70s and 80s, particularly on days when he takes glipizide. He is advised to increase the threshold for taking glipizide to prevent these lows, as low blood sugar can cause more harm than slightly elevated levels. He is currently on Victoza 1.8 mg daily, glipizide 5 mg as needed if blood glucose is greater than 165 consistently, and Farxiga 5 mg daily. A prescription for a glucagon pen will be provided, which should be administered by his wife in the event of unresponsiveness due to severe hypoglycemia. He is also on gabapentin 100 mg 1 to 3 capsules at bedtime for polyneuropathy.    2. Stage II chronic kidney disease.  He is on Farxiga 5 mg daily for both type 2 diabetes and chronic kidney disease. Blood work will be conducted today to assess kidney function and electrolyte levels, as it has been 6 months since the last evaluation.    3. Hypothyroidism.  He is on levothyroxine 112 mcg daily.  Continue.    4. Proximal atrial fibrillation.  He is on warfarin therapy and follows up with a cardiologist. His INR levels have been consistently therapeutic.    5. Ischemic cardiomyopathy.  Recommend he continue to follow with his cardiologist.    6. Hypertension.  His blood pressure today is well-controlled at 120/82.    7. Hyperlipidemia.  A re-evaluation of his cholesterol levels will be conducted today.    8. Coronary artery disease.  Continue with current treatment measures including healthy diet, active lifestyle, current medications and continue to follow with cardiology.    9. Gout.  He is on allopurinol and reports no recent flare-ups.    Patient or patient representative verbalized consent for the use of Ambient Listening during the visit with  BULMARO De Santiago for chart documentation. 2/21/2025  13:00 CST    Please note that portions of this note were completed  with a voice recognition program.     Electronically signed by BULMARO De Santiago, 02/21/25, 13:01 CST.

## 2025-02-22 LAB
BUN SERPL-MCNC: 23 MG/DL (ref 8–23)
BUN/CREAT SERPL: 18 (ref 7–25)
CALCIUM SERPL-MCNC: 9.4 MG/DL (ref 8.6–10.5)
CHLORIDE SERPL-SCNC: 105 MMOL/L (ref 98–107)
CHOLEST SERPL-MCNC: 126 MG/DL (ref 0–200)
CO2 SERPL-SCNC: 23.7 MMOL/L (ref 22–29)
CREAT SERPL-MCNC: 1.28 MG/DL (ref 0.76–1.27)
EGFRCR SERPLBLD CKD-EPI 2021: 60.6 ML/MIN/1.73
GLUCOSE SERPL-MCNC: 134 MG/DL (ref 65–99)
HDLC SERPL-MCNC: 22 MG/DL (ref 40–60)
LDLC SERPL CALC-MCNC: 57 MG/DL (ref 0–100)
POTASSIUM SERPL-SCNC: 5 MMOL/L (ref 3.5–5.2)
SODIUM SERPL-SCNC: 141 MMOL/L (ref 136–145)
TRIGL SERPL-MCNC: 295 MG/DL (ref 0–150)
VLDLC SERPL CALC-MCNC: 47 MG/DL (ref 5–40)

## 2025-02-28 ENCOUNTER — ANTI-COAG VISIT (OUTPATIENT)
Dept: CARDIOLOGY CLINIC | Age: 70
End: 2025-02-28
Payer: MEDICARE

## 2025-02-28 DIAGNOSIS — I48.0 PAF (PAROXYSMAL ATRIAL FIBRILLATION) (HCC): Primary | ICD-10-CM

## 2025-02-28 DIAGNOSIS — Z79.01 LONG TERM (CURRENT) USE OF ANTICOAGULANTS: ICD-10-CM

## 2025-02-28 LAB
INTERNATIONAL NORMALIZATION RATIO, POC: 2
PROTHROMBIN TIME, POC: 22

## 2025-02-28 PROCEDURE — 93793 ANTICOAG MGMT PT WARFARIN: CPT | Performed by: CLINICAL NURSE SPECIALIST

## 2025-02-28 PROCEDURE — 85610 PROTHROMBIN TIME: CPT | Performed by: CLINICAL NURSE SPECIALIST

## 2025-02-28 PROCEDURE — 36415 COLL VENOUS BLD VENIPUNCTURE: CPT | Performed by: CLINICAL NURSE SPECIALIST

## 2025-02-28 NOTE — PROGRESS NOTES
Anticoagulation Summary  As of 2025      INR goal:  2.0-3.0   TTR:  59.6% (13.7 y)   INR used for dosin.0 (2025)   Warfarin maintenance plan:  7.5 mg (5 mg x 1.5) every Mon, Wed, Fri; 10 mg (5 mg x 2) all other days   Weekly warfarin total:  62.5 mg   Plan last modified:  Leora Berry MA (3/29/2022)   Next INR check:  2025   Target end date:  --             Anticoagulation Episode Summary       INR check location:  --    Preferred lab:  --    Send INR reminders to:  [facility] PRACT[facility] STAFF    Comments:  2-2.5            Anticoagulation Care Providers       Provider Role Specialty Phone number    Cece Becker APRN  Cardiovascular Disease [phone]                Dosing Plan  As of 2025      TTR:  59.6% (13.7 y)   Full warfarin instructions:  7.5 mg every Mon, Wed, Fri; 10 mg all other days               Made Inder Morton aware of findings by phone.     Electronically signed by BEVERLY Dejesus on 25 at 9:01 AM CST

## 2025-03-03 NOTE — PROGRESS NOTES
Stable labs.  Triglycerides are higher than previously, HDL remains low, LDL is currently stable, make sure to include healthy sources of fats such as fish,-nuts, seeds, avocado, avocado oil/olive oil, as well as consuming a diet rich in fiber can help naturally lower, at this time I am recommending continuing as is otherwise.

## 2025-03-05 LAB
EXPIRATION DATE: ABNORMAL
HBA1C MFR BLD: 6 % (ref 4.5–5.7)
Lab: ABNORMAL

## 2025-04-11 ENCOUNTER — ANTI-COAG VISIT (OUTPATIENT)
Dept: CARDIOLOGY CLINIC | Age: 70
End: 2025-04-11
Payer: MEDICARE

## 2025-04-11 DIAGNOSIS — Z79.01 LONG TERM (CURRENT) USE OF ANTICOAGULANTS: ICD-10-CM

## 2025-04-11 DIAGNOSIS — I48.0 PAF (PAROXYSMAL ATRIAL FIBRILLATION) (HCC): Primary | ICD-10-CM

## 2025-04-11 LAB
INTERNATIONAL NORMALIZATION RATIO, POC: 2.7
PROTHROMBIN TIME, POC: 28.4

## 2025-04-11 PROCEDURE — 36415 COLL VENOUS BLD VENIPUNCTURE: CPT | Performed by: NURSE PRACTITIONER

## 2025-04-11 PROCEDURE — 93793 ANTICOAG MGMT PT WARFARIN: CPT | Performed by: NURSE PRACTITIONER

## 2025-04-11 PROCEDURE — 85610 PROTHROMBIN TIME: CPT | Performed by: NURSE PRACTITIONER

## 2025-04-11 NOTE — PROGRESS NOTES
Anticoagulation Summary  As of 2025      INR goal:  2.0-3.0   TTR:  60.0% (13.8 y)   INR used for dosin.7 (2025)   Warfarin maintenance plan:  7.5 mg (5 mg x 1.5) every Mon, Wed, Fri; 10 mg (5 mg x 2) all other days   Weekly warfarin total:  62.5 mg   Plan last modified:  Leora Berry MA (3/29/2022)   Next INR check:  2025   Target end date:  --             Anticoagulation Episode Summary       INR check location:  --    Preferred lab:  --    Send INR reminders to:  [facility]OLOG[facility] PRACTICE STAFF    Comments:  2-2.5            Anticoagulation Care Providers       Provider Role Specialty Phone number    Cece Becker APRN  Cardiovascular Disease [phone]                Dosing Plan  As of 2025      TTR:  60.0% (13.8 y)   Full warfarin instructions:  7.5 mg every Mon, Wed, Fri; 10 mg all other days               Made Inder Morton aware of findings by phone.     Electronically signed by BEVERLY Meyer NP on 25 at 8:21 AM CDT

## 2025-04-21 DIAGNOSIS — E11.42 TYPE 2 DIABETES MELLITUS WITH DIABETIC POLYNEUROPATHY, WITHOUT LONG-TERM CURRENT USE OF INSULIN: ICD-10-CM

## 2025-04-21 RX ORDER — LIRAGLUTIDE 6 MG/ML
1.8 INJECTION SUBCUTANEOUS DAILY
Qty: 27 ML | Refills: 1 | Status: SHIPPED | OUTPATIENT
Start: 2025-04-21 | End: 2025-04-24 | Stop reason: SDUPTHER

## 2025-04-24 ENCOUNTER — TELEPHONE (OUTPATIENT)
Dept: INTERNAL MEDICINE | Facility: CLINIC | Age: 70
End: 2025-04-24

## 2025-04-24 DIAGNOSIS — E11.42 TYPE 2 DIABETES MELLITUS WITH DIABETIC POLYNEUROPATHY, WITHOUT LONG-TERM CURRENT USE OF INSULIN: ICD-10-CM

## 2025-04-24 RX ORDER — LIRAGLUTIDE 6 MG/ML
1.8 INJECTION SUBCUTANEOUS DAILY
Qty: 27 ML | Refills: 1 | Status: SHIPPED | OUTPATIENT
Start: 2025-04-24 | End: 2025-04-24

## 2025-04-24 NOTE — TELEPHONE ENCOUNTER
Caller: [facility] [address] [facility] [place] [address] [facility] - [phone]  - [phone]    Relationship to patient: Pharmacy    Best call back number: [phone]     Patient is needing: CAN'T KEEP THE BRAND NAME OF VICTOZA, CAN HE GET GENERIC? PATIENT IS OK WITH IT.     PATIENT IS WAITING    THEY WILL NEED NEW PRESCIPTION SENT

## 2025-04-24 NOTE — TELEPHONE ENCOUNTER
Caller: Jn Cárdenas    Relationship: Self    Best call back number: [phone]     What medication are you requesting: LIONEL    What are your current symptoms: DIABETES    [facility] [id number] [facility], [place] - [address] [facility] [phone] [facility] [phone]       Additional notes: [facility] SAID THAT IN S WILL NOT PAY FOR THE MEDICATION BUT IT WOULD COVER THE MONJARO    HE WILL BE LEAVING SAT & WILL BE GONE FOR 10 DAYS

## 2025-05-12 DIAGNOSIS — E11.42 TYPE 2 DIABETES MELLITUS WITH DIABETIC POLYNEUROPATHY, WITHOUT LONG-TERM CURRENT USE OF INSULIN: ICD-10-CM

## 2025-05-12 RX ORDER — DAPAGLIFLOZIN 5 MG/1
5 TABLET, FILM COATED ORAL DAILY
Qty: 90 TABLET | Refills: 1 | Status: SHIPPED | OUTPATIENT
Start: 2025-05-12

## 2025-05-16 DIAGNOSIS — E11.42 TYPE 2 DIABETES MELLITUS WITH DIABETIC POLYNEUROPATHY, WITHOUT LONG-TERM CURRENT USE OF INSULIN: ICD-10-CM

## 2025-05-16 RX ORDER — TIRZEPATIDE 2.5 MG/.5ML
INJECTION, SOLUTION SUBCUTANEOUS
Qty: 4 ML | Refills: 0 | Status: SHIPPED | OUTPATIENT
Start: 2025-05-16

## 2025-05-16 NOTE — PROGRESS NOTES
"      Chief Complaint  yeast infection (Bilateral groin area- does have apt with specialist  on 3/28/24 but needed something to get him through until then.) and Diabetes (Ozempic does not seem to be working )    Subjective        Jn Cárdenas presents to [facility]    HPI    Patient here for the above problems.  See Assessment and Plan for further HPI components.      Review of Systems    Objective   Vital Signs:  /80 (BP Location: Left arm, Patient Position: Sitting, Cuff Size: Adult)   Pulse 78   Temp 98.1 °F (36.7 °C) (Temporal)   Ht 188 cm (74.02\")   Wt 109 kg (240 lb)   SpO2 98%   BMI 30.80 kg/m²   Estimated body mass index is 30.8 kg/m² as calculated from the following:    Height as of this encounter: 188 cm (74.02\").    Weight as of this encounter: 109 kg (240 lb).      Physical Exam  Vitals and nursing note reviewed.   Constitutional:       Appearance: He is obese. He is not ill-appearing.   Eyes:      General: No scleral icterus.     Conjunctiva/sclera: Conjunctivae normal.   Pulmonary:      Effort: Pulmonary effort is normal. No respiratory distress.   Skin:         Neurological:      General: No focal deficit present.      Mental Status: He is alert and oriented to person, place, and time.   Psychiatric:         Mood and Affect: Mood normal.         Behavior: Behavior normal.                       Assessment and Plan   Diagnoses and all orders for this visit:    1. Type 2 diabetes mellitus with diabetic polyneuropathy, without long-term current use of insulin (Primary)  -     Liraglutide (VICTOZA) 18 MG/3ML solution pen-injector injection; Inject 0.6 mg under the skin into the appropriate area as directed Daily for 7 days, THEN 1.2 mg Daily for 7 days, THEN 1.8 mg Daily for 70 days.  Dispense: 24 mL; Refill: 0    2. Tinea corporis  -     nystatin (MYCOSTATIN) 288711 UNIT/GM powder; Apply  topically to the appropriate area as directed 3 (Three) Times a Day.  " Dispense: 60 g; Refill: 2  -     fluconazole (DIFLUCAN) 200 MG tablet; Take 1 tablet by mouth Daily for 7 days.  Dispense: 7 tablet; Refill: 0    3. Class 1 obesity due to excess calories with serious comorbidity and body mass index (BMI) of 30.0 to 30.9 in adult    4. Chronic anticoagulation          Patient has diabetes.  Current A1c is 6.5.    Based on patient's last A1c they are currently at goal.. The patient's A1c goal is 7.0 - 7.5%  Recommend routine monitoring of blood sugar. Avoid hypoglycemia. Recommend ADA diet and avoidance of excess carbohydrates. Patient is currently on  GLP-1 with ozempic, previously on victoza .  Recommend that we make changes as above.  Patient did better on victoaza.  Patient would like to transition over.  I think a little less of issue is medication and more of the issue is patients activity and eating habits.  Patient has continued to gain weight despite being on an a GLP-1.  Patient only eats 1-2 times per day and typically over eats.  Discussed with him that eating 3-4 times per day and avoiding overeating would be better.   Recommend at least annual eye examination.  Recommend at least annual diabetic foot examination.  Recommend checking self checks of feet routinely.Patient has the following diabetic complications: obesity and hyperglycemia.      Patient cannot take SGLT2 due to recurrent yeast infections.  He was previously on it.  Did not tolerate metformin previously.     Continue to monitor.  Patient's last diabetic nephropathy evaluation was:  Creatinine, Urine (mg/dL)   Date/Time Value   07/07/2023 1300 42.4     Microalbumin, Urine (ug/mL)   Date/Time Value   07/07/2023 1300 <3.0     Protein (no units)   Date/Time Value   06/29/2023 0714 Negative     Protein, POC (mg/dL)   Date/Time Value   05/25/2019 1357 Negative     A1C Last 3 Results          6/29/2023    07:14 10/16/2023    08:05 1/19/2024    08:23   HGBA1C Last 3 Results   Hemoglobin A1C 6.20  6.3  6.5          Patient would benefit from nutrition referral.      Patient has a BMI > 30.  Obesity increases risks of diseases such as diabetes, coronary artery disease, hypertension, sleep apnea, hyperlipidemia, arthritis, and stroke.  Recommend focusing on portion control and making healthy diet choices.  Avoid fast food.  Avoid calorie beverages including sweet tea, juice, soft drinks, and alcohol.  Recommend increasing your activity and starting a regular exercise program. Can consider utilizing calorie counting apps such as [employer].  Was very blunt with patient about eating habits as he has gained a lot of weight despite being on medications that aid with weight loss.    Patient has tinea in his inguinal area.  Has upcoming visit with dermatologist.  Would punch biopsy or scrape today but since he has upcoming visit with derm will hold off.  Will treat with PO fluconazole and nystatin powder.  Patient will have to monitor INR as the patient is noted to be on warfarin realted to his atrial fibrillation.        Result Review :  The following data was reviewed by: Armond Grider MD on 03/18/2024:  CMP          4/3/2023    10:01 6/29/2023    07:14 10/16/2023    07:49   CMP   Glucose 108  118  131    BUN 28  22  20    Creatinine 1.26  1.35  1.01    Sodium 142  142  140    Potassium 4.7  4.9  4.3    Chloride 103  107  103    Calcium 10.1  10.0  9.4    Total Protein  7.3     Albumin  4.7     Globulin  2.6     Total Bilirubin  0.4     Alkaline Phosphatase  86     AST (SGOT)  23     ALT (SGPT)  28     BUN/Creatinine Ratio 22.2  16.3  19.8      CBC w/diff          6/29/2023    07:14   CBC w/Diff   WBC 7.24    RBC 5.09    Hemoglobin 16.4    Hematocrit 47.8    MCV 93.9    MCH 32.2    MCHC 34.3    RDW 12.8    Platelets 226    Neutrophil Rel % 60.5    Lymphocyte Rel % 20.3    Monocyte Rel % 9.4    Eosinophil Rel % 8.3    Basophil Rel % 1.4      Lipid Panel          4/3/2023    10:01 6/29/2023    07:14   Lipid Panel   Total  Cholesterol 160  146    Triglycerides 243  221    HDL Cholesterol 32  30    VLDL Cholesterol 41  37    LDL Cholesterol  87  79      TSH          4/3/2023    10:01 6/29/2023    07:14   TSH   TSH 2.830  2.550      A1C Last 3 Results          6/29/2023    07:14 10/16/2023    08:05 1/19/2024    08:23   HGBA1C Last 3 Results   Hemoglobin A1C 6.20  6.3  6.5      Microalbumin          7/7/2023    13:00   Microalbumin   Microalbumin, Urine <3.0                   BMI is >= 30 and <35. (Class 1 Obesity). The following options were offered after discussion;: exercise counseling/recommendations and nutrition counseling/recommendations            Follow Up   No follow-ups on file.  Patient was given instructions and counseling regarding his condition or for health maintenance advice. Please see specific information pulled into the AVS if appropriate.       YANELIS Grider MD, FACP, FHM      Electronically signed by Armond Grider MD, 03/19/24, 11:03 AM CDT.           Him/He

## 2025-05-22 ENCOUNTER — ANTI-COAG VISIT (OUTPATIENT)
Dept: CARDIOLOGY CLINIC | Age: 70
End: 2025-05-22

## 2025-05-22 DIAGNOSIS — I48.0 PAF (PAROXYSMAL ATRIAL FIBRILLATION) (HCC): Primary | ICD-10-CM

## 2025-05-22 LAB
INTERNATIONAL NORMALIZATION RATIO, POC: 2.9
PROTHROMBIN TIME, POC: 30.5

## 2025-05-22 RX ORDER — WARFARIN SODIUM 5 MG/1
TABLET ORAL
Qty: 180 TABLET | Refills: 0 | Status: SHIPPED | OUTPATIENT
Start: 2025-05-22

## 2025-05-22 NOTE — PROGRESS NOTES
Anticoagulation Summary  As of 5/22/2025      INR goal:  2.0-3.0   TTR:  60.3% (13.9 y)   INR used for dosing:  --   Warfarin maintenance plan:  7.5 mg (5 mg x 1.5) every Mon, Wed, Fri; 10 mg (5 mg x 2) all other days   Weekly warfarin total:  62.5 mg   Plan last modified:  Leora Berry MA (3/29/2022)   Next INR check:  7/3/2025   Target end date:  --             Anticoagulation Episode Summary       INR check location:  --    Preferred lab:  --    Send INR reminders to:  [facility]OLOG[facility] PRACTICE STAFF    Comments:  2-2.5            Anticoagulation Care Providers       Provider Role Specialty Phone number    Cece Becker APRN  Cardiovascular Disease [phone]                Dosing Plan  As of 5/22/2025      TTR:  60.3% (13.9 y)   Full warfarin instructions:  7.5 mg every Mon, Wed, Fri; 10 mg all other days               Made Inder Morton aware of findings by phone.     Electronically signed by BEVERLY Meyer NP on 5/22/25 at 1:34 PM CDT

## 2025-05-27 ENCOUNTER — OFFICE VISIT (OUTPATIENT)
Dept: INTERNAL MEDICINE | Facility: CLINIC | Age: 70
End: 2025-05-27
Payer: MEDICARE

## 2025-05-27 VITALS
HEART RATE: 64 BPM | OXYGEN SATURATION: 94 % | WEIGHT: 234 LBS | DIASTOLIC BLOOD PRESSURE: 70 MMHG | HEIGHT: 74 IN | BODY MASS INDEX: 30.03 KG/M2 | TEMPERATURE: 97.5 F | SYSTOLIC BLOOD PRESSURE: 120 MMHG

## 2025-05-27 DIAGNOSIS — E66.811 CLASS 1 OBESITY DUE TO EXCESS CALORIES WITH SERIOUS COMORBIDITY AND BODY MASS INDEX (BMI) OF 30.0 TO 30.9 IN ADULT: ICD-10-CM

## 2025-05-27 DIAGNOSIS — I25.10 CORONARY ARTERY DISEASE INVOLVING NATIVE CORONARY ARTERY OF NATIVE HEART WITHOUT ANGINA PECTORIS: ICD-10-CM

## 2025-05-27 DIAGNOSIS — G25.81 RESTLESS LEGS SYNDROME: ICD-10-CM

## 2025-05-27 DIAGNOSIS — E78.2 MIXED HYPERLIPIDEMIA: ICD-10-CM

## 2025-05-27 DIAGNOSIS — N18.2 STAGE 2 CHRONIC KIDNEY DISEASE: ICD-10-CM

## 2025-05-27 DIAGNOSIS — I48.0 PAF (PAROXYSMAL ATRIAL FIBRILLATION): ICD-10-CM

## 2025-05-27 DIAGNOSIS — Z79.01 CHRONIC ANTICOAGULATION: ICD-10-CM

## 2025-05-27 DIAGNOSIS — E66.09 CLASS 1 OBESITY DUE TO EXCESS CALORIES WITH SERIOUS COMORBIDITY AND BODY MASS INDEX (BMI) OF 30.0 TO 30.9 IN ADULT: ICD-10-CM

## 2025-05-27 DIAGNOSIS — E11.42 TYPE 2 DIABETES MELLITUS WITH DIABETIC POLYNEUROPATHY, WITHOUT LONG-TERM CURRENT USE OF INSULIN: ICD-10-CM

## 2025-05-27 DIAGNOSIS — I10 ESSENTIAL HYPERTENSION: ICD-10-CM

## 2025-05-27 LAB — HBA1C MFR BLD: 6.1 % (ref 4.5–5.7)

## 2025-05-27 PROCEDURE — 3078F DIAST BP <80 MM HG: CPT

## 2025-05-27 PROCEDURE — 1159F MED LIST DOCD IN RCRD: CPT

## 2025-05-27 PROCEDURE — 83036 HEMOGLOBIN GLYCOSYLATED A1C: CPT

## 2025-05-27 PROCEDURE — 3074F SYST BP LT 130 MM HG: CPT

## 2025-05-27 PROCEDURE — 3044F HG A1C LEVEL LT 7.0%: CPT

## 2025-05-27 PROCEDURE — 99214 OFFICE O/P EST MOD 30 MIN: CPT

## 2025-05-27 PROCEDURE — 1160F RVW MEDS BY RX/DR IN RCRD: CPT

## 2025-05-27 PROCEDURE — 1126F AMNT PAIN NOTED NONE PRSNT: CPT

## 2025-05-27 RX ORDER — TIRZEPATIDE 2.5 MG/.5ML
2.5 INJECTION, SOLUTION SUBCUTANEOUS WEEKLY
Qty: 6 ML | Refills: 2 | Status: SHIPPED | OUTPATIENT
Start: 2025-05-27

## 2025-05-27 NOTE — PROGRESS NOTES
Subjective   Jn Cárdenas is a 69 y.o. male.   Chief Complaint   Patient presents with    Hypertension     3 month follow up     Diabetes     A1c: 6.1         History of Present Illness  The patient is a 69-year-old male who presents to the office today for a routine 3-month follow-up on the management of his hypertension, hyperlipidemia, type 2 diabetes with diabetic polyneuropathy (painful), paroxysmal atrial fibrillation, chronic anticoagulation, coronary artery disease (class I), higher body weight, stage 2 chronic kidney disease, and restless legs syndrome (RLS).    He has been previously maintained on Victoza; however, his insurance quit paying for it, so he was recently switched to Mounjaro 2.5 mg once weekly. He is also on lisinopril 10 mg daily. He prefers to keep his A1c less than 6.5%, so he takes glipizide 5 mg daily as needed for impaired fasting glucose greater than 165. He has been thoroughly educated on the risks associated with hypoglycemia. He reports no new health concerns since his last visit. He experiences occasional hypoglycemic episodes, which he manages effectively. He has not required glipizide since starting Mounjaro. He has lost 10 pounds since February, attributing this weight loss to Mounjaro, which has also reduced his appetite. He reports no changes in thirst or water intake since starting Mounjaro. He maintains good foot hygiene, applying lotion daily and inspecting his feet twice daily.    He is on warfarin as prescribed by his cardiologist, whom he follows due to his history of CAD and PAF. He is considering changing his cardiologist. He reports no bleeding issues. He has experienced several transient ischemic attacks (TIAs) but has not been informed about the possibility of a Watchman device. He was diagnosed with a massive myocardial infarction in his late 30s and was initiated on anticoagulation therapy at that time. He reports no new symptoms of dyspnea, chest pressure, or  pain.    He is on Farxiga 5 mg daily for the management of type 2 diabetes and chronic kidney disease. His last renal function assessment was in 02/2025. At that time, his GFR was 60.6, creatinine 1.28, and BUN 23, with stable electrolytes.    He is on omeprazole 40 mg daily for his GERD.    He takes Lotrisone as needed for tinea issues.    He also has hypothyroidism and is on levothyroxine 112 mcg daily.    He reports constipation since starting Mounjaro, which he manages with Metamucil.    He continues to find gabapentin beneficial for his neuropathy.       The following portions of the patient's history were reviewed and updated as appropriate: allergies, current medications, past family history, past medical history, past social history, past surgical history and problem list.    Review of Systems    Objective   Past Medical History:   Diagnosis Date    Arthritis     Cataract     Coronary artery disease     Diabetes mellitus     Disease of thyroid gland     Gout     Hypertension     Myocardial infarct, old     24 yrs ago    Neuropathy     in feet and legs    Parotid tumor     Spastic colon     TIA (transient ischemic attack)     weakness on left upper extremity      Past Surgical History:   Procedure Laterality Date    BACK SURGERY      had spinal fusion    OTHER SURGICAL HISTORY      had goiter removed  at 4 yrs old    OTHER SURGICAL HISTORY Right     had broken arm,    PAROTIDECTOMY Left 3/21/2017    Procedure: PAROTID TUMOR EXCISION WITH FACIAL NERVE MONITORING,  DISSECTION LEFT;  Surgeon: Armond Leon MD;  Location: [facility] OR;  Service:         Current Outpatient Medications:     allopurinol (ZYLOPRIM) 300 MG tablet, Take 1 tablet by mouth once daily, Disp: 90 tablet, Rfl: 3    baclofen (LIORESAL) 10 MG tablet, TAKE UP TO 3 TABLETS BY MOUTH NIGHTLY IF NEEDED, Disp: 90 tablet, Rfl: 2    Bromelains (BROMELAIN PO), Take 1 capsule by mouth Daily., Disp: , Rfl:     Chromium 200 MCG capsule, Take 200 mcg  by mouth 2 (Two) Times a Day., Disp: , Rfl:     CloNIDine (CATAPRES) 0.1 MG tablet, Take 1 tablet by mouth Daily., Disp: , Rfl:     clotrimazole-betamethasone (LOTRISONE) 1-0.05 % cream, Apply  topically to the appropriate area as directed 2 (Two) Times a Day., Disp: 90 g, Rfl: 1    Diphenhydramine-PSE-APAP (ALLERGY SINUS HEADACHE RELIEF PO), Take 1 tablet by mouth Every Night. May take 1-2 daily for sinus headache, Disp: , Rfl:     EQL CINNAMON PO, Take 1,000 mg by mouth Daily., Disp: , Rfl:     Farxiga 5 MG tablet tablet, Take 1 tablet by mouth once daily, Disp: 90 tablet, Rfl: 1    fluticasone (VERAMYST) 27.5 MCG/SPRAY nasal spray, Administer 2 sprays into the nostril(s) as directed by provider Daily., Disp: , Rfl:     gabapentin (NEURONTIN) 100 MG capsule, TAKE 1 TO 3 CAPSULES BY MOUTH ONCE DAILY AT BEDTIME AS NEEDED FOR NEUROPATHY, Disp: 90 capsule, Rfl: 5    glipizide (GLUCOTROL) 5 MG tablet, Take 1 tablet by mouth Daily As Needed (if blood glucose is greater than 165 consistently)., Disp: 30 tablet, Rfl: 3    Glucagon (Gvoke HypoPen 2-Pack) 1 MG/0.2ML solution auto-injector, Inject 1 mg under the skin into the appropriate area as directed 1 (One) Time As Needed (hypoglycemia) for up to 1 dose., Disp: 0.4 mL, Rfl: 1    levothyroxine (SYNTHROID, LEVOTHROID) 112 MCG tablet, Take 1 tablet by mouth once daily, Disp: 90 tablet, Rfl: 3    lisinopril (PRINIVIL,ZESTRIL) 10 MG tablet, Take 1 tablet by mouth once daily, Disp: 90 tablet, Rfl: 3    Multiple Vitamins-Minerals (ALIVE MENS ENERGY) tablet, Take 1 tablet by mouth Daily., Disp: , Rfl:     NON FORMULARY, 1 application  As Needed (is over the counter rollon applies to  legs prn  max freeze). Bio freeze, Disp: , Rfl:     NON FORMULARY, Take 2 tablets by mouth Daily. Healthy feet and nerves, Disp: , Rfl:     nystatin [id number] UNIT/GM powder, APPLY TOPICALLY TO THE APPROPRIATE AREA AS DIRECTED 3 TIMES A DAY, Disp: 60 g, Rfl: 3    omeprazole (priLOSEC) 40 MG capsule,  "Take 1 capsule by mouth Daily., Disp: , Rfl:     OneTouch Ultra test strip, USE 1 TEST STRIP TO CHECK BLOOD SUGAR TWICE A DAY AS INSTRUCTED, Disp: 100 each, Rfl: 5    Tirzepatide (Mounjaro) 2.5 MG/0.5ML solution auto-injector, Inject 2.5 mg under the skin into the appropriate area as directed 1 (One) Time Per Week., Disp: 6 mL, Rfl: 2    warfarin (COUMADIN) 5 MG tablet, Take 1 tablet by mouth Daily. Take 7.5mg 3 days weekly, and  10 mg 4 days weekly, Disp: , Rfl:       /70 (BP Location: Left arm, Patient Position: Sitting, Cuff Size: Adult)   Pulse 64   Temp 97.5 °F (36.4 °C) (Temporal)   Ht 188 cm (74.02\")   Wt 106 kg (234 lb)   SpO2 94%   BMI 30.03 kg/m²      Body mass index is 30.03 kg/m².          Physical Exam  Vitals and nursing note reviewed.   Constitutional:       General: He is not in acute distress.     Appearance: Normal appearance. He is obese. He is not ill-appearing, toxic-appearing or diaphoretic.   HENT:      Head: Normocephalic and atraumatic.   Eyes:      Extraocular Movements: Extraocular movements intact.      Conjunctiva/sclera: Conjunctivae normal.      Pupils: Pupils are equal, round, and reactive to light.   Cardiovascular:      Rate and Rhythm: Normal rate and regular rhythm.      Pulses: Normal pulses.           Dorsalis pedis pulses are 2+ on the right side and 2+ on the left side.        Posterior tibial pulses are 2+ on the right side and 2+ on the left side.      Heart sounds: Normal heart sounds.   Pulmonary:      Effort: Pulmonary effort is normal.      Breath sounds: Normal breath sounds.   Abdominal:      General: Bowel sounds are normal.      Palpations: Abdomen is soft.   Musculoskeletal:         General: Normal range of motion.      Cervical back: Normal range of motion and neck supple.      Right lower leg: No edema.      Left lower leg: No edema.      Right foot: Normal range of motion.      Left foot: Normal range of motion.   Feet:      Right foot:      " Protective Sensation: 10 sites tested.  7 sites sensed.      Skin integrity: Skin integrity normal.      Toenail Condition: Right toenails are normal.      Left foot:      Protective Sensation: 10 sites tested.  6 sites sensed.      Skin integrity: Skin integrity normal.      Toenail Condition: Left toenails are abnormally thick.   Skin:     General: Skin is warm and dry.      Capillary Refill: Capillary refill takes less than 2 seconds.   Neurological:      General: No focal deficit present.      Mental Status: He is alert and oriented to person, place, and time. Mental status is at baseline.      Sensory: Sensory deficit present.   Psychiatric:         Mood and Affect: Mood normal.         Behavior: Behavior normal.         Thought Content: Thought content normal.         Judgment: Judgment normal.               Assessment & Plan   Diagnoses and all orders for this visit:    1. Essential hypertension    2. Mixed hyperlipidemia    3. Type 2 diabetes mellitus with diabetic polyneuropathy, without long-term current use of insulin  -     POC Glycated Hemoglobin, Total  -     Tirzepatide (Mounjaro) 2.5 MG/0.5ML solution auto-injector; Inject 2.5 mg under the skin into the appropriate area as directed 1 (One) Time Per Week.  Dispense: 6 mL; Refill: 2    4. PAF (paroxysmal atrial fibrillation)    5. Chronic anticoagulation    6. Coronary artery disease involving native coronary artery of native heart without angina pectoris    7. Class 1 obesity due to excess calories with serious comorbidity and body mass index (BMI) of 30.0 to 30.9 in adult    8. Stage 2 chronic kidney disease    9. Restless legs syndrome                 Assessment & Plan  1. Type 2 diabetes mellitus with diabetic polyneuropathy.  - A1c level remains well-controlled at 6.1%.  - Experienced a weight loss of 10 pounds since February.  - Currently on the lowest dose of Mounjaro and has not required glipizide; reports occasional hypoglycemia but manages it  without difficulty.  - Advised to continue current regimen of Mounjaro 2.5 mg once weekly and Farxiga 5 mg daily; refill for Mounjaro will be provided. If hypoglycemia becomes more frequent, dosage of Mounjaro may be adjusted to every other week.  - Emphasized importance of consuming 3 nutrient dense meals daily with adequate protein intake as well as protein content with every snack in between meals.  Continue with active lifestyle.    2. Hypertension.  - Blood pressure is well-controlled at 120/70 mmHg.  - Currently on lisinopril 10 mg daily.  - No changes to hypertension management are necessary at this time.    3. Hyperlipidemia.  - Lipid panel from February was adequate with an LDL of 57 mg/dL.  - No changes to lipid management are necessary at this time.    4. Paroxysmal atrial fibrillation.  - On chronic anticoagulation with warfarin as prescribed by cardiologist.  - Expressed interest in changing cardiologist.  - Advised to consider a second opinion from a heart specialist at [facility] for potential alternative interventions that may not require lifelong anticoagulation.    5. Coronary artery disease.  - History of coronary artery disease and follows up with cardiologist.  - Reports no new symptoms such as shortness of breath or chest pain.  - No changes to current management are necessary at this time.    6. Hypothyroidism.  - On levothyroxine 112 mcg daily.  - No changes to hypothyroidism management are necessary at this time.    7. Gastroesophageal reflux disease.  - On omeprazole 40 mg daily.  - No changes to GERD management are necessary at this time.    8. Tinea issues.  - Uses Lotrisone as needed for tinea issues.  - No changes to this management are necessary at this time.    9. Chronic kidney disease, stage II.  - Last renal function assessment in February showed a GFR of 60.6 mL/min, creatinine of 1.28 mg/dL, and BUN of 23 mg/dL with stable electrolytes.  - Maintained on Farxiga 5 mg daily for the  management of chronic kidney disease (lower dose due to increased incidence of skin fungal issues with higher dose)    10. Restless legs syndrome.  - No changes to RLS management are necessary at this time.    11. Constipation.  - Reports constipation since starting Mounjaro, which is managed with Metamucil.  - Advised to continue using Metamucil daily to prevent constipation.    12. Neuropathy.  - Continues to find gabapentin beneficial for neuropathy.    Follow-up  - Scheduled for a follow-up visit on 08/28/2025 or earlier if any concerns arise.    Patient or patient representative verbalized consent for the use of Ambient Listening during the visit with  BULMARO De Santiago for chart documentation. 5/27/2025  08:01 CDT    Please note that portions of this note were completed with a voice recognition program.     Electronically signed by BULMARO De Santiago, 05/27/25, 08:22 CDT.

## 2025-06-17 DIAGNOSIS — B35.4 TINEA CORPORIS: ICD-10-CM

## 2025-06-17 RX ORDER — LEVOTHYROXINE SODIUM 112 UG/1
112 TABLET ORAL DAILY
Qty: 90 TABLET | Refills: 3 | Status: SHIPPED | OUTPATIENT
Start: 2025-06-17

## 2025-06-17 RX ORDER — CLOTRIMAZOLE AND BETAMETHASONE DIPROPIONATE 10; .64 MG/G; MG/G
CREAM TOPICAL SEE ADMIN INSTRUCTIONS
Qty: 90 G | Refills: 0 | Status: SHIPPED | OUTPATIENT
Start: 2025-06-17

## 2025-06-26 ENCOUNTER — ANTI-COAG VISIT (OUTPATIENT)
Dept: CARDIOLOGY CLINIC | Age: 70
End: 2025-06-26
Payer: MEDICARE

## 2025-06-26 DIAGNOSIS — I48.0 PAF (PAROXYSMAL ATRIAL FIBRILLATION) (HCC): Primary | ICD-10-CM

## 2025-06-26 LAB
INTERNATIONAL NORMALIZATION RATIO, POC: 2.2
PROTHROMBIN TIME, POC: 24.3

## 2025-06-26 PROCEDURE — 93793 ANTICOAG MGMT PT WARFARIN: CPT | Performed by: NURSE PRACTITIONER

## 2025-06-26 PROCEDURE — 36415 COLL VENOUS BLD VENIPUNCTURE: CPT | Performed by: NURSE PRACTITIONER

## 2025-06-26 PROCEDURE — 85610 PROTHROMBIN TIME: CPT | Performed by: NURSE PRACTITIONER

## 2025-06-26 NOTE — PROGRESS NOTES
Anticoagulation Summary  As of 2025      INR goal:  2.0-3.0   TTR:  60.6% (14 y)   INR used for dosin.2 (2025)   Warfarin maintenance plan:  7.5 mg (5 mg x 1.5) every Mon, Wed, Fri; 10 mg (5 mg x 2) all other days   Weekly warfarin total:  62.5 mg   Plan last modified:  Leora Berry MA (3/29/2022)   Next INR check:  2025   Target end date:  --             Anticoagulation Episode Summary       INR check location:  --    Preferred lab:  --    Send INR reminders to:  [facility]    Comments:  2-2.5            Anticoagulation Care Providers       Provider Role Specialty Phone number    Cece Becker APRN  Cardiovascular Disease [phone]                Dosing Plan  As of 2025      TTR:  60.6% (14 y)   Full warfarin instructions:  7.5 mg every Mon, Wed, Fri; 10 mg all other days               Made Inder Morton aware of findings by phone.     Electronically signed by BEVERLY Meyer NP on 25 at 8:34 AM IHSANT

## 2025-07-31 DIAGNOSIS — G25.81 RESTLESS LEG SYNDROME: ICD-10-CM

## 2025-07-31 RX ORDER — BACLOFEN 10 MG/1
TABLET ORAL
Qty: 90 TABLET | Refills: 2 | Status: SHIPPED | OUTPATIENT
Start: 2025-07-31

## 2025-08-04 ENCOUNTER — OFFICE VISIT (OUTPATIENT)
Dept: CARDIOLOGY CLINIC | Age: 70
End: 2025-08-04
Payer: MEDICARE

## 2025-08-04 VITALS
HEART RATE: 65 BPM | DIASTOLIC BLOOD PRESSURE: 84 MMHG | SYSTOLIC BLOOD PRESSURE: 122 MMHG | BODY MASS INDEX: 28.47 KG/M2 | OXYGEN SATURATION: 96 % | WEIGHT: 221.8 LBS | RESPIRATION RATE: 18 BRPM | HEIGHT: 74 IN

## 2025-08-04 DIAGNOSIS — I10 ESSENTIAL HYPERTENSION: ICD-10-CM

## 2025-08-04 DIAGNOSIS — Z79.01 LONG TERM (CURRENT) USE OF ANTICOAGULANTS: ICD-10-CM

## 2025-08-04 DIAGNOSIS — I48.0 PAF (PAROXYSMAL ATRIAL FIBRILLATION) (HCC): Primary | ICD-10-CM

## 2025-08-04 DIAGNOSIS — E78.5 DYSLIPIDEMIA: ICD-10-CM

## 2025-08-04 LAB
INTERNATIONAL NORMALIZATION RATIO, POC: 2
PROTHROMBIN TIME, POC: 21.7

## 2025-08-04 PROCEDURE — 99214 OFFICE O/P EST MOD 30 MIN: CPT | Performed by: NURSE PRACTITIONER

## 2025-08-04 PROCEDURE — 3079F DIAST BP 80-89 MM HG: CPT | Performed by: NURSE PRACTITIONER

## 2025-08-04 PROCEDURE — 36415 COLL VENOUS BLD VENIPUNCTURE: CPT | Performed by: NURSE PRACTITIONER

## 2025-08-04 PROCEDURE — G8417 CALC BMI ABV UP PARAM F/U: HCPCS | Performed by: NURSE PRACTITIONER

## 2025-08-04 PROCEDURE — 1159F MED LIST DOCD IN RCRD: CPT | Performed by: NURSE PRACTITIONER

## 2025-08-04 PROCEDURE — 3074F SYST BP LT 130 MM HG: CPT | Performed by: NURSE PRACTITIONER

## 2025-08-04 PROCEDURE — 85610 PROTHROMBIN TIME: CPT | Performed by: NURSE PRACTITIONER

## 2025-08-04 PROCEDURE — G8427 DOCREV CUR MEDS BY ELIG CLIN: HCPCS | Performed by: NURSE PRACTITIONER

## 2025-08-04 PROCEDURE — 1123F ACP DISCUSS/DSCN MKR DOCD: CPT | Performed by: NURSE PRACTITIONER

## 2025-08-04 PROCEDURE — 3017F COLORECTAL CA SCREEN DOC REV: CPT | Performed by: NURSE PRACTITIONER

## 2025-08-04 PROCEDURE — 1036F TOBACCO NON-USER: CPT | Performed by: NURSE PRACTITIONER

## 2025-08-04 RX ORDER — TIRZEPATIDE 2.5 MG/.5ML
INJECTION, SOLUTION SUBCUTANEOUS
COMMUNITY

## 2025-08-04 ASSESSMENT — ENCOUNTER SYMPTOMS
SHORTNESS OF BREATH: 1
SORE THROAT: 0
WHEEZING: 0
COUGH: 0
CHEST TIGHTNESS: 0

## 2025-08-13 DIAGNOSIS — B35.4 TINEA CORPORIS: ICD-10-CM

## 2025-08-13 RX ORDER — CLOTRIMAZOLE AND BETAMETHASONE DIPROPIONATE 10; .64 MG/G; MG/G
CREAM TOPICAL SEE ADMIN INSTRUCTIONS
Qty: 90 G | Refills: 0 | Status: SHIPPED | OUTPATIENT
Start: 2025-08-13

## 2025-08-18 RX ORDER — WARFARIN SODIUM 5 MG/1
TABLET ORAL
Qty: 180 TABLET | Refills: 0 | Status: SHIPPED | OUTPATIENT
Start: 2025-08-18

## 2025-08-25 ENCOUNTER — LAB (OUTPATIENT)
Dept: INTERNAL MEDICINE | Facility: CLINIC | Age: 70
End: 2025-08-25
Payer: MEDICARE

## 2025-08-25 DIAGNOSIS — E11.42 TYPE 2 DIABETES MELLITUS WITH DIABETIC POLYNEUROPATHY, WITHOUT LONG-TERM CURRENT USE OF INSULIN: ICD-10-CM

## 2025-08-25 DIAGNOSIS — Z12.5 SCREENING PSA (PROSTATE SPECIFIC ANTIGEN): ICD-10-CM

## 2025-08-25 DIAGNOSIS — E03.9 ACQUIRED HYPOTHYROIDISM: ICD-10-CM

## 2025-08-25 DIAGNOSIS — N18.31 STAGE 3A CHRONIC KIDNEY DISEASE: ICD-10-CM

## 2025-08-25 DIAGNOSIS — I10 ESSENTIAL HYPERTENSION: Primary | ICD-10-CM

## 2025-08-25 DIAGNOSIS — E78.2 MIXED HYPERLIPIDEMIA: ICD-10-CM

## 2025-08-25 DIAGNOSIS — Z79.899 ENCOUNTER FOR LONG-TERM (CURRENT) DRUG USE: ICD-10-CM

## 2025-08-25 LAB
AMPHET+METHAMPHET UR QL: NEGATIVE
AMPHETAMINE INTERNAL CONTROL: NORMAL
AMPHETAMINES UR QL: NEGATIVE
BARBITURATE INTERNAL CONTROL: NORMAL
BARBITURATES UR QL SCN: NEGATIVE
BENZODIAZ UR QL SCN: NEGATIVE
BENZODIAZEPINE INTERNAL CONTROL: NORMAL
BUPRENORPHINE INTERNAL CONTROL: NORMAL
BUPRENORPHINE SERPL-MCNC: NEGATIVE NG/ML
CANNABINOIDS SERPL QL: NEGATIVE
COCAINE INTERNAL CONTROL: NORMAL
COCAINE UR QL: NEGATIVE
EXPIRATION DATE: NORMAL
Lab: NORMAL
MDMA (ECSTASY) INTERNAL CONTROL: NORMAL
MDMA UR QL SCN: NEGATIVE
METHADONE INTERNAL CONTROL: NORMAL
METHADONE UR QL SCN: NEGATIVE
METHAMPHETAMINE INTERNAL CONTROL: NORMAL
MORPHINE INTERNAL CONTROL: NORMAL
MORPHINE/OPIATES SCREEN, URINE: NEGATIVE
OXYCODONE INTERNAL CONTROL: NORMAL
OXYCODONE UR QL SCN: NEGATIVE
PCP UR QL SCN: NEGATIVE
PHENCYCLIDINE INTERNAL CONTROL: NORMAL
PROPOXYPH UR QL SCN: NEGATIVE
PROPOXYPHENE INTERNAL CONTROL: NORMAL
THC INTERNAL CONTROL: NORMAL
TRICYCLIC ANTIDEPRESSANTS INTERNAL CONTROL: NORMAL
TRICYCLICS UR QL SCN: NEGATIVE

## 2025-08-25 PROCEDURE — 80305 DRUG TEST PRSMV DIR OPT OBS: CPT

## 2025-08-26 LAB
ALBUMIN SERPL-MCNC: 4.4 G/DL (ref 3.5–5.2)
ALBUMIN/CREAT UR: <8 MG/G CREAT (ref 0–29)
ALBUMIN/GLOB SERPL: 1.9 G/DL
ALP SERPL-CCNC: 107 U/L (ref 39–117)
ALT SERPL-CCNC: 25 U/L (ref 1–41)
APPEARANCE UR: CLEAR
AST SERPL-CCNC: 26 U/L (ref 1–40)
BACTERIA #/AREA URNS HPF: NORMAL /HPF
BASOPHILS # BLD AUTO: 0.1 10*3/MM3 (ref 0–0.2)
BASOPHILS NFR BLD AUTO: 1.3 % (ref 0–1.5)
BILIRUB SERPL-MCNC: 0.5 MG/DL (ref 0–1.2)
BILIRUB UR QL STRIP: NEGATIVE
BUN SERPL-MCNC: 19 MG/DL (ref 8–23)
BUN/CREAT SERPL: 16.8 (ref 7–25)
CALCIUM SERPL-MCNC: 8.9 MG/DL (ref 8.6–10.5)
CASTS URNS MICRO: NORMAL
CHLORIDE SERPL-SCNC: 103 MMOL/L (ref 98–107)
CHOLEST SERPL-MCNC: 100 MG/DL (ref 0–200)
CO2 SERPL-SCNC: 23.9 MMOL/L (ref 22–29)
COLOR UR: YELLOW
CREAT SERPL-MCNC: 1.13 MG/DL (ref 0.76–1.27)
CREAT UR-MCNC: 37.2 MG/DL
EGFRCR SERPLBLD CKD-EPI 2021: 70.4 ML/MIN/1.73
EOSINOPHIL # BLD AUTO: 0.67 10*3/MM3 (ref 0–0.4)
EOSINOPHIL NFR BLD AUTO: 8.6 % (ref 0.3–6.2)
EPI CELLS #/AREA URNS HPF: NORMAL /HPF
ERYTHROCYTE [DISTWIDTH] IN BLOOD BY AUTOMATED COUNT: 13.5 % (ref 12.3–15.4)
GLOBULIN SER CALC-MCNC: 2.3 GM/DL
GLUCOSE SERPL-MCNC: 106 MG/DL (ref 65–99)
GLUCOSE UR QL STRIP: ABNORMAL
HBA1C MFR BLD: 5.8 % (ref 4.8–5.6)
HCT VFR BLD AUTO: 48.4 % (ref 37.5–51)
HDLC SERPL-MCNC: 26 MG/DL (ref 40–60)
HGB BLD-MCNC: 16.1 G/DL (ref 13–17.7)
HGB UR QL STRIP: NEGATIVE
IMM GRANULOCYTES # BLD AUTO: 0.02 10*3/MM3 (ref 0–0.05)
IMM GRANULOCYTES NFR BLD AUTO: 0.3 % (ref 0–0.5)
KETONES UR QL STRIP: NEGATIVE
LDLC SERPL CALC-MCNC: 47 MG/DL (ref 0–100)
LEUKOCYTE ESTERASE UR QL STRIP: NEGATIVE
LYMPHOCYTES # BLD AUTO: 1.58 10*3/MM3 (ref 0.7–3.1)
LYMPHOCYTES NFR BLD AUTO: 20.2 % (ref 19.6–45.3)
MCH RBC QN AUTO: 32.5 PG (ref 26.6–33)
MCHC RBC AUTO-ENTMCNC: 33.3 G/DL (ref 31.5–35.7)
MCV RBC AUTO: 97.6 FL (ref 79–97)
MICROALBUMIN UR-MCNC: <3 UG/ML
MONOCYTES # BLD AUTO: 0.61 10*3/MM3 (ref 0.1–0.9)
MONOCYTES NFR BLD AUTO: 7.8 % (ref 5–12)
NEUTROPHILS # BLD AUTO: 4.85 10*3/MM3 (ref 1.7–7)
NEUTROPHILS NFR BLD AUTO: 61.8 % (ref 42.7–76)
NITRITE UR QL STRIP: NEGATIVE
NRBC BLD AUTO-RTO: 0 /100 WBC (ref 0–0.2)
PH UR STRIP: 6.5 [PH] (ref 5–8)
PLATELET # BLD AUTO: 244 10*3/MM3 (ref 140–450)
POTASSIUM SERPL-SCNC: 4.5 MMOL/L (ref 3.5–5.2)
PROT SERPL-MCNC: 6.7 G/DL (ref 6–8.5)
PROT UR QL STRIP: NEGATIVE
PSA SERPL-MCNC: 0.46 NG/ML (ref 0–4)
RBC # BLD AUTO: 4.96 10*6/MM3 (ref 4.14–5.8)
RBC #/AREA URNS HPF: NORMAL /HPF
SODIUM SERPL-SCNC: 140 MMOL/L (ref 136–145)
SP GR UR STRIP: 1.01 (ref 1–1.03)
TRIGL SERPL-MCNC: 156 MG/DL (ref 0–150)
TSH SERPL DL<=0.005 MIU/L-ACNC: 2.69 UIU/ML (ref 0.27–4.2)
UROBILINOGEN UR STRIP-MCNC: ABNORMAL MG/DL
VLDLC SERPL CALC-MCNC: 27 MG/DL (ref 5–40)
WBC # BLD AUTO: 7.83 10*3/MM3 (ref 3.4–10.8)
WBC #/AREA URNS HPF: NORMAL /HPF

## 2025-08-28 ENCOUNTER — OFFICE VISIT (OUTPATIENT)
Dept: INTERNAL MEDICINE | Facility: CLINIC | Age: 70
End: 2025-08-28
Payer: MEDICARE

## 2025-08-28 VITALS
BODY MASS INDEX: 27.85 KG/M2 | HEART RATE: 70 BPM | OXYGEN SATURATION: 95 % | TEMPERATURE: 97.1 F | HEIGHT: 74 IN | SYSTOLIC BLOOD PRESSURE: 128 MMHG | DIASTOLIC BLOOD PRESSURE: 78 MMHG | WEIGHT: 217 LBS

## 2025-08-28 DIAGNOSIS — G25.81 RESTLESS LEGS SYNDROME: ICD-10-CM

## 2025-08-28 DIAGNOSIS — Z87.39 PERSONAL HISTORY OF GOUT: ICD-10-CM

## 2025-08-28 DIAGNOSIS — Z00.00 ENCOUNTER FOR SUBSEQUENT ANNUAL WELLNESS VISIT (AWV) IN MEDICARE PATIENT: Primary | ICD-10-CM

## 2025-08-28 DIAGNOSIS — E78.2 MIXED HYPERLIPIDEMIA: ICD-10-CM

## 2025-08-28 DIAGNOSIS — J30.1 SEASONAL ALLERGIC RHINITIS DUE TO POLLEN: ICD-10-CM

## 2025-08-28 DIAGNOSIS — E66.3 OVERWEIGHT (BMI 25.0-29.9): ICD-10-CM

## 2025-08-28 DIAGNOSIS — I25.10 CORONARY ARTERY DISEASE INVOLVING NATIVE CORONARY ARTERY OF NATIVE HEART WITHOUT ANGINA PECTORIS: ICD-10-CM

## 2025-08-28 DIAGNOSIS — N18.2 STAGE 2 CHRONIC KIDNEY DISEASE: ICD-10-CM

## 2025-08-28 DIAGNOSIS — E03.9 ACQUIRED HYPOTHYROIDISM: ICD-10-CM

## 2025-08-28 DIAGNOSIS — Z79.01 CHRONIC ANTICOAGULATION: ICD-10-CM

## 2025-08-28 DIAGNOSIS — J44.89 CHRONIC BRONCHITIS, OBSTRUCTIVE: ICD-10-CM

## 2025-08-28 DIAGNOSIS — E11.40 DIABETIC NEUROPATHY, PAINFUL: ICD-10-CM

## 2025-08-28 DIAGNOSIS — Z12.11 COLON CANCER SCREENING: ICD-10-CM

## 2025-08-28 DIAGNOSIS — I10 ESSENTIAL HYPERTENSION: ICD-10-CM

## 2025-08-28 DIAGNOSIS — I48.0 PAF (PAROXYSMAL ATRIAL FIBRILLATION): ICD-10-CM

## 2025-08-28 DIAGNOSIS — E11.42 TYPE 2 DIABETES MELLITUS WITH DIABETIC POLYNEUROPATHY, WITHOUT LONG-TERM CURRENT USE OF INSULIN: ICD-10-CM

## (undated) DEVICE — SUT MNCRYL 4/0 P3 18IN UD MCP494G

## (undated) DEVICE — ADHS LIQ MASTISOL 2/3ML

## (undated) DEVICE — TRY PREP SCRB VAG PVP

## (undated) DEVICE — DISPOSABLE IRRIGATION BIPOLAR CORD, M1000 TYPE: Brand: KIRWAN

## (undated) DEVICE — ANTIBACTERIAL UNDYED BRAIDED (POLYGLACTIN 910), SYNTHETIC ABSORBABLE SUTURE: Brand: COATED VICRYL

## (undated) DEVICE — 3M™ IOBAN™ 2 ANTIMICROBIAL INCISE DRAPE 6650EZ: Brand: IOBAN™ 2

## (undated) DEVICE — 3M™ WARMING BLANKET, LOWER BODY, 10 PER CASE, 42568: Brand: BAIR HUGGER™

## (undated) DEVICE — 3M™ STERI-STRIP™ REINFORCED ADHESIVE SKIN CLOSURES, R1546, 1/4 IN X 4 IN (6 MM X 100 MM), 10 STRIPS/ENVELOPE: Brand: 3M™ STERI-STRIP™

## (undated) DEVICE — PROBE 8225101 5PK STD PRASS FL TIP ROHS

## (undated) DEVICE — SUT SILK 3/0 SUTUPAK TIES 24IN SA74H

## (undated) DEVICE — ELECTRODE 8227411 PAIRED 4 CH SET ROHS

## (undated) DEVICE — Device

## (undated) DEVICE — GLV SURG BIOGEL M LTX PF 7 1/2

## (undated) DEVICE — RESERVOIR,SUCTION,100CC,SILICONE: Brand: MEDLINE

## (undated) DEVICE — DURAHOOK 1/4HOOK PK/6

## (undated) DEVICE — SUT SILK 2/0 SUTUPAK TIES 24IN SA75H

## (undated) DEVICE — PAD ENT UNIVERSAL: Brand: MEDLINE INDUSTRIES, INC.

## (undated) DEVICE — MARKR SKIN W/RULR AND LBL

## (undated) DEVICE — HEMOSTATIX THERMAL SCALPEL SYSTEM MODEL 5715 BLADE - QTY 24: Brand: HEMOSTATIX THERMAL SCALPEL SYSTEM MODEL 5715 BLADE - QTY 24